# Patient Record
Sex: MALE | Race: WHITE | NOT HISPANIC OR LATINO | Employment: OTHER | URBAN - METROPOLITAN AREA
[De-identification: names, ages, dates, MRNs, and addresses within clinical notes are randomized per-mention and may not be internally consistent; named-entity substitution may affect disease eponyms.]

---

## 2017-09-19 ENCOUNTER — APPOINTMENT (OUTPATIENT)
Dept: ADMISSIONS | Facility: MEDICAL CENTER | Age: 72
DRG: 310 | End: 2017-09-19
Payer: COMMERCIAL

## 2017-09-21 ENCOUNTER — APPOINTMENT (OUTPATIENT)
Dept: ADMISSIONS | Facility: MEDICAL CENTER | Age: 72
DRG: 310 | End: 2017-09-21
Attending: NEUROLOGICAL SURGERY
Payer: COMMERCIAL

## 2017-09-21 RX ORDER — DOXYCYCLINE HYCLATE 50 MG/1
50 CAPSULE ORAL DAILY
Status: ON HOLD | COMMUNITY
End: 2018-02-14

## 2017-09-26 ENCOUNTER — RESOLUTE PROFESSIONAL BILLING HOSPITAL PROF FEE (OUTPATIENT)
Dept: HOSPITALIST | Facility: MEDICAL CENTER | Age: 72
End: 2017-09-26
Payer: COMMERCIAL

## 2017-09-26 ENCOUNTER — APPOINTMENT (OUTPATIENT)
Dept: RADIOLOGY | Facility: MEDICAL CENTER | Age: 72
DRG: 310 | End: 2017-09-26
Attending: NEUROLOGICAL SURGERY
Payer: COMMERCIAL

## 2017-09-26 ENCOUNTER — HOSPITAL ENCOUNTER (INPATIENT)
Facility: MEDICAL CENTER | Age: 72
LOS: 1 days | DRG: 310 | End: 2017-09-27
Attending: NEUROLOGICAL SURGERY | Admitting: NEUROLOGICAL SURGERY
Payer: COMMERCIAL

## 2017-09-26 PROBLEM — M51.36 DDD (DEGENERATIVE DISC DISEASE), LUMBAR: Status: ACTIVE | Noted: 2017-09-26

## 2017-09-26 PROBLEM — M51.369 DDD (DEGENERATIVE DISC DISEASE), LUMBAR: Status: ACTIVE | Noted: 2017-09-26

## 2017-09-26 LAB
EKG IMPRESSION: NORMAL
EKG IMPRESSION: NORMAL
GLUCOSE BLD-MCNC: 124 MG/DL (ref 65–99)
GLUCOSE BLD-MCNC: 124 MG/DL (ref 65–99)
LV EJECT FRACT MOD 2C 99903: 62.39
LV EJECT FRACT MOD 4C 99902: 56.25
LV EJECT FRACT MOD BP 99901: 57.79
TROPONIN I SERPL-MCNC: 0.02 NG/ML (ref 0–0.04)
TROPONIN I SERPL-MCNC: 0.02 NG/ML (ref 0–0.04)

## 2017-09-26 PROCEDURE — 82962 GLUCOSE BLOOD TEST: CPT | Mod: 91

## 2017-09-26 PROCEDURE — 160041 HCHG SURGERY MINUTES - EA ADDL 1 MIN LEVEL 4: Performed by: NEUROLOGICAL SURGERY

## 2017-09-26 PROCEDURE — 160048 HCHG OR STATISTICAL LEVEL 1-5: Performed by: NEUROLOGICAL SURGERY

## 2017-09-26 PROCEDURE — 36415 COLL VENOUS BLD VENIPUNCTURE: CPT

## 2017-09-26 PROCEDURE — 160009 HCHG ANES TIME/MIN: Performed by: NEUROLOGICAL SURGERY

## 2017-09-26 PROCEDURE — 700111 HCHG RX REV CODE 636 W/ 250 OVERRIDE (IP)

## 2017-09-26 PROCEDURE — 84484 ASSAY OF TROPONIN QUANT: CPT

## 2017-09-26 PROCEDURE — 700101 HCHG RX REV CODE 250

## 2017-09-26 PROCEDURE — 770020 HCHG ROOM/CARE - TELE (206)

## 2017-09-26 PROCEDURE — 500885 HCHG PACK, JACKSON TABLE: Performed by: NEUROLOGICAL SURGERY

## 2017-09-26 PROCEDURE — 160029 HCHG SURGERY MINUTES - 1ST 30 MINS LEVEL 4: Performed by: NEUROLOGICAL SURGERY

## 2017-09-26 PROCEDURE — 160002 HCHG RECOVERY MINUTES (STAT): Performed by: NEUROLOGICAL SURGERY

## 2017-09-26 PROCEDURE — 93306 TTE W/DOPPLER COMPLETE: CPT | Mod: 26 | Performed by: INTERNAL MEDICINE

## 2017-09-26 PROCEDURE — 700102 HCHG RX REV CODE 250 W/ 637 OVERRIDE(OP): Performed by: INTERNAL MEDICINE

## 2017-09-26 PROCEDURE — A9270 NON-COVERED ITEM OR SERVICE: HCPCS | Performed by: INTERNAL MEDICINE

## 2017-09-26 PROCEDURE — 93005 ELECTROCARDIOGRAM TRACING: CPT | Performed by: ANESTHESIOLOGY

## 2017-09-26 PROCEDURE — 160036 HCHG PACU - EA ADDL 30 MINS PHASE I: Performed by: NEUROLOGICAL SURGERY

## 2017-09-26 PROCEDURE — 160035 HCHG PACU - 1ST 60 MINS PHASE I: Performed by: NEUROLOGICAL SURGERY

## 2017-09-26 PROCEDURE — 93010 ELECTROCARDIOGRAM REPORT: CPT | Mod: 76 | Performed by: INTERNAL MEDICINE

## 2017-09-26 PROCEDURE — 502627 HCHG HEMOSTAT, SURGICEL 4X4: Performed by: NEUROLOGICAL SURGERY

## 2017-09-26 PROCEDURE — 93306 TTE W/DOPPLER COMPLETE: CPT

## 2017-09-26 PROCEDURE — 99222 1ST HOSP IP/OBS MODERATE 55: CPT | Performed by: INTERNAL MEDICINE

## 2017-09-26 PROCEDURE — 501838 HCHG SUTURE GENERAL: Performed by: NEUROLOGICAL SURGERY

## 2017-09-26 RX ORDER — POLYETHYLENE GLYCOL 3350 17 G/17G
1 POWDER, FOR SOLUTION ORAL
Status: DISCONTINUED | OUTPATIENT
Start: 2017-09-26 | End: 2017-09-27 | Stop reason: HOSPADM

## 2017-09-26 RX ORDER — ONDANSETRON 2 MG/ML
4 INJECTION INTRAMUSCULAR; INTRAVENOUS EVERY 4 HOURS PRN
Status: DISCONTINUED | OUTPATIENT
Start: 2017-09-26 | End: 2017-09-27 | Stop reason: HOSPADM

## 2017-09-26 RX ORDER — LIDOCAINE HYDROCHLORIDE 10 MG/ML
0.5 INJECTION, SOLUTION INFILTRATION; PERINEURAL
Status: DISCONTINUED | OUTPATIENT
Start: 2017-09-26 | End: 2017-09-27 | Stop reason: HOSPADM

## 2017-09-26 RX ORDER — ONDANSETRON 4 MG/1
4 TABLET, ORALLY DISINTEGRATING ORAL EVERY 4 HOURS PRN
Status: DISCONTINUED | OUTPATIENT
Start: 2017-09-26 | End: 2017-09-27 | Stop reason: HOSPADM

## 2017-09-26 RX ORDER — SODIUM CHLORIDE 9 MG/ML
INJECTION, SOLUTION INTRAVENOUS CONTINUOUS
Status: DISCONTINUED | OUTPATIENT
Start: 2017-09-26 | End: 2017-09-27 | Stop reason: HOSPADM

## 2017-09-26 RX ORDER — BISACODYL 10 MG
10 SUPPOSITORY, RECTAL RECTAL
Status: DISCONTINUED | OUTPATIENT
Start: 2017-09-26 | End: 2017-09-27 | Stop reason: HOSPADM

## 2017-09-26 RX ORDER — LIDOCAINE AND PRILOCAINE 25; 25 MG/G; MG/G
1 CREAM TOPICAL
Status: DISCONTINUED | OUTPATIENT
Start: 2017-09-26 | End: 2017-09-27 | Stop reason: HOSPADM

## 2017-09-26 RX ORDER — AMLODIPINE BESYLATE 10 MG/1
10 TABLET ORAL DAILY
Status: DISCONTINUED | OUTPATIENT
Start: 2017-09-27 | End: 2017-09-27 | Stop reason: HOSPADM

## 2017-09-26 RX ORDER — ACETAMINOPHEN 325 MG/1
650 TABLET ORAL EVERY 6 HOURS PRN
Status: DISCONTINUED | OUTPATIENT
Start: 2017-09-26 | End: 2017-09-27 | Stop reason: HOSPADM

## 2017-09-26 RX ORDER — AMOXICILLIN 250 MG
2 CAPSULE ORAL 2 TIMES DAILY
Status: DISCONTINUED | OUTPATIENT
Start: 2017-09-26 | End: 2017-09-27 | Stop reason: HOSPADM

## 2017-09-26 RX ORDER — OMEPRAZOLE 20 MG/1
20 CAPSULE, DELAYED RELEASE ORAL DAILY
Status: DISCONTINUED | OUTPATIENT
Start: 2017-09-27 | End: 2017-09-27 | Stop reason: HOSPADM

## 2017-09-26 RX ORDER — QUINAPRIL 40 MG/1
40 TABLET ORAL DAILY
Status: DISCONTINUED | OUTPATIENT
Start: 2017-09-26 | End: 2017-09-27 | Stop reason: HOSPADM

## 2017-09-26 RX ORDER — HEPARIN SODIUM 5000 [USP'U]/ML
5000 INJECTION, SOLUTION INTRAVENOUS; SUBCUTANEOUS EVERY 8 HOURS
Status: DISCONTINUED | OUTPATIENT
Start: 2017-09-27 | End: 2017-09-27 | Stop reason: HOSPADM

## 2017-09-26 RX ORDER — ROSUVASTATIN CALCIUM 10 MG/1
5 TABLET, COATED ORAL EVERY EVENING
Status: DISCONTINUED | OUTPATIENT
Start: 2017-09-26 | End: 2017-09-27 | Stop reason: HOSPADM

## 2017-09-26 RX ORDER — PANTOPRAZOLE SODIUM 40 MG/1
20 TABLET, DELAYED RELEASE ORAL DAILY
Status: DISCONTINUED | OUTPATIENT
Start: 2017-09-26 | End: 2017-09-26

## 2017-09-26 RX ORDER — MIDAZOLAM HYDROCHLORIDE 1 MG/ML
INJECTION INTRAMUSCULAR; INTRAVENOUS
Status: COMPLETED
Start: 2017-09-26 | End: 2017-09-26

## 2017-09-26 RX ORDER — SODIUM CHLORIDE 9 MG/ML
INJECTION, SOLUTION INTRAVENOUS CONTINUOUS
Status: CANCELLED | OUTPATIENT
Start: 2017-09-26

## 2017-09-26 RX ORDER — ACARBOSE 50 MG/1
50 TABLET ORAL 2 TIMES DAILY
Status: DISCONTINUED | OUTPATIENT
Start: 2017-09-26 | End: 2017-09-27 | Stop reason: HOSPADM

## 2017-09-26 RX ADMIN — ROSUVASTATIN CALCIUM 5 MG: 10 TABLET, FILM COATED ORAL at 20:17

## 2017-09-26 RX ADMIN — QUINAPRIL HYDROCHLORIDE 40 MG: 40 TABLET, FILM COATED ORAL at 18:00

## 2017-09-26 RX ADMIN — MIDAZOLAM 1 MG: 1 INJECTION INTRAMUSCULAR; INTRAVENOUS at 10:55

## 2017-09-26 ASSESSMENT — ENCOUNTER SYMPTOMS
HEADACHES: 0
NAUSEA: 0
PALPITATIONS: 0
WEIGHT LOSS: 0
NERVOUS/ANXIOUS: 0
ABDOMINAL PAIN: 0
SHORTNESS OF BREATH: 0
EYE REDNESS: 0
EYE DISCHARGE: 0
FOCAL WEAKNESS: 0
DIZZINESS: 0
SEIZURES: 0
VOMITING: 0
EYE PAIN: 0
STRIDOR: 0
INSOMNIA: 0
CHILLS: 0
DIARRHEA: 0
NECK PAIN: 0
BLURRED VISION: 0
SPUTUM PRODUCTION: 0
MYALGIAS: 0
HEARTBURN: 0
DEPRESSION: 0
FEVER: 0
ORTHOPNEA: 0
BACK PAIN: 1
COUGH: 0

## 2017-09-26 ASSESSMENT — PAIN SCALES - GENERAL
PAINLEVEL_OUTOF10: 2
PAINLEVEL_OUTOF10: 0

## 2017-09-26 NOTE — ASSESSMENT & PLAN NOTE
Post CABG  See cardiology at [place]  LBBB on EKG, but no previous ekg to compare    Patient has NO chest pain

## 2017-09-26 NOTE — CONSULTS
Cardiology New Consult Note    Date of note:    9/26/2017      Consulting Physician: Lenny Lew M.D.    Patient ID:    Name:   Wang Forman   YOB: 1945  Age:   72 y.o.  male   MRN:   [id number]      Consult question: Evaluation of new left bundle branch block    HPI:  Wang Forman is a 72-year-old man with a history of multivessel coronary artery disease status post four-vessel bypass surgery in 2013 done at [facility] who presents for elective lumbar spine surgery. He was in the process of mobilizing to the table when his QRS widened and a 12-lead EKG reviewed revealed left bundle branch block. At that point anesthesia canceled the case.    In speaking with the patient, he has no chest discomfort, nor any other cardiovascular complaints. He has been in his usual state of health up to and leading to this procedure.    ROS: All others reviewed and negative.    PMH (problem list reviewed, pertinent to this consultation):     1. Coronary artery disease: Status post 4 vessel bypass surgery after a STEMI in 2013 (LIMA-LAD, SVG-diagonal, SVG-OM1, SVG-OM 2).  2. Paroxysmal atrial fibrillation  3. Obesity  4. Renal artery stenosis  5. Sleep apnea  6. Dyslipidemia  7. Essential hypertension  8. Osteoarthritis    Outpatient Medications:     Amoxicillin when necessary dental work  Doxycycline  Protonix  Amlodipine 10 mg by mouth daily  Crestor 5 mg by mouth daily at bedtime  Precose  Testosterone IM  Cialis 5 mg by mouth daily when necessary  Quinapril 40 mg by mouth daily  Aspirin 81 mg by mouth daily  Multivitamin    Allergies: Naproxen; Nsaids; Codeine; Morphine; Statins [hmg-coa-r inhibitors]; and Sulfa drugs    Family History: family history includes Cancer in his sister; Heart Attack (age of onset: 62) in his father; Heart Disease in his father; Hypertension in his brother.    Social History:  reports that he quit smoking about 30 years ago. His smoking use included Cigarettes. He has never  "used smokeless tobacco. He reports that he does not drink alcohol or use drugs.    Physical Exam    Body mass index is 34.56 kg/m².  Blood pressure 141/65, pulse 60, temperature 37.1 °C (98.8 °F), resp. rate (!) 11, height 1.702 m (5' 7\"), weight 100.1 kg (220 lb 10.9 oz), SpO2 97 %.  Vitals:    17 1130 17 1145 17 1200 17 1215   BP:       Pulse: (!) 59 (!) 58 61 60   Resp: 14 16 (!) 24 (!) 11   Temp:       SpO2: 97% 97% 96% 97%   Weight:       Height:         Oxygen Therapy:  Pulse Oximetry: 97 %, O2 (LPM): 0, O2 Delivery: None (Room Air)    General:Pleasant, elderly man accompanied by his wife in no distress  HEENT: No jugular venous distension sitting upright, pupils equal, round and reactive to light, extraocular movements intact  Heart: All healed sternotomy scar noted. Normal rate, regular rhythm, no murmurs, no rubs or gallops  Lungs: Clear to auscultation bilaterally  Abdomen: Bowel sounds positive, non tender, non distended, no masses   Extremities: No lower extremity edema, no clubbing, no cyanosis  Neurological: Alert and oriented x 3, no focal deficit, full exam deferred  Skin: no rashes    Labs (reviewed and notable for):     CBC, 2017 pending at the time of this note  Chemistry panel, 2017 pending at the time of this note  Cardiac markers: Initial troponin is negative at 0.02 ng/mL    EKG: Shows sinus rhythm with a left bundle branch block. Previous EKG from 2013 shows sinus rhythm with an incomplete left bundle branch block    Echo: Pending at the time of this note      Impression and Medical Decision Makin. New left bundle branch block: I do not believe this is ischemic in etiology, but rather progression in his calcific conduction system disease. His initial troponin is negative, I would recommend a repeat troponin in 3-4 hours. I have also ordered an echocardiogram. So long as he has no new wall motion abnormality and a normal left ventricular ejection " fraction I do not believe he needs additional workup, and can be discharged after an observation admission at that time.    2. Paroxysmal atrial fibrillation: Maintaining sinus rhythm without antiarrhythmic therapy and on aspirin. I will defer anticoagulation to the patient and his primary cardiologist.    3. Coronary artery disease: Continue outpatient management at the time of discharge      This note was dictated using Dragon speech recognition software.    Thank you for allowing me to participate in the care of this patient.  Please call if any clarification will be helpful.      Guevara Herbert MD  Cardiologist, [facility]     Addendum: Troponin negative x 2, no anterior wall motion abnormality on echo.  No additional work-up required prior to surgery in my opinion and stable for discharge from the cardiovascular perspective.  No other recommendations, will sign off.    Guevara Herbert MD  Cardiologist, [facility]

## 2017-09-26 NOTE — CONSULTS
" Hospital Medicine History and Physical      Date of Service  9/26/2017    Chief Complaint  No chief complaint on file.  back pain    Consulting physician: Dr Lew    Reason for consult: medical management    History of Presenting Illness  Dickson is a 72 y.o. male PMH of CAD post CABG, HTN, DMII, who presents with back pain and plan for elective back surgery. During pre-opt he developed in LBBB. He was intubated briefly for surgery, but due to above surgery was postponed. He was extubated. He was seen in PACU. Denies chest pain, palpitation. Medicine was consulted for medical management. Recommended cardiology consult for pre-opt risk assessment for her lumbar surgery.    Primary Care Physician  Mohan Kurtz M.D.      Code Status  Full code    Review of Systems  Review of Systems   Constitutional: Negative for chills, fever and weight loss.   HENT: Negative for congestion and nosebleeds.    Eyes: Negative for blurred vision, pain, discharge and redness.   Respiratory: Negative for cough, sputum production, shortness of breath and stridor.    Cardiovascular: Negative for chest pain, palpitations and orthopnea.   Gastrointestinal: Negative for abdominal pain, diarrhea, heartburn, nausea and vomiting.   Genitourinary: Negative for dysuria, frequency and urgency.   Musculoskeletal: Positive for back pain. Negative for myalgias and neck pain.   Skin: Negative for itching and rash.   Neurological: Negative for dizziness, focal weakness, seizures and headaches.   Psychiatric/Behavioral: Negative for depression. The patient is not nervous/anxious and does not have insomnia.      Please see HPI, all other systems were reviewed and are negative (AMA/CMS criteria)     Past Medical History  Past Medical History:   Diagnosis Date   • H/O bone density study 03/30/12    [place],    • Adult acne    • Allergic rhinitis    • Anesthesia     \"hard time waking up\" and itching    • Arthritis of knee    • ASTHMA     states he's only " had 2 episodes    • Atrial fibrillation (CMS-HCC)     post-op   • Breath shortness     with exertion   • Cataract     bilat IOL   • Colon polyps    • Coronary artery disease     cabg/ [place]   • Degenerative disk disease     lumbar and cervical   • DISH (diffuse idiopathic skeletal hyperostosis)     back   • Fibromyalgia    • Heart burn    • History of kidney stones    • Hyperlipidemia     with elevated triglycerides and low HDL   • Hypertension    • Hypogonadism, male    • Myalgia    • Osteoarthritis    • Restless leg syndrome    • Sleep apnea     on bipap Garcia   • Type 2 diabetes mellitus (CMS-HCC)     oral meds (pt becomes symptomatic when FSBS is in the 70s)    • Vertigo        Surgical History  Past Surgical History:   Procedure Laterality Date   • MULTIPLE CORONARY ARTERY BYPASS  12/5/2013    [facility]   • CARPAL TUNNEL ENDOSCOPIC  2011    RT   • KNEE ARTHROSCOPY  2009    RT   • ANGIOPLASTY  2009    ADDITIONAL DRUG-ELUTING STENT IN CIRCUMFLEX   • ANGIOPLASTY  2004    MULTIVESSEL ANGIOPLASTY AND DRUG-ELUTING STENTS   • CATARACT EXTRACTION WITH IOL     • UVULOPHARYNGOPALATOPLASTY         Medications  No current facility-administered medications on file prior to encounter.      Current Outpatient Prescriptions on File Prior to Encounter   Medication Sig Dispense Refill   • pantoprazole (PROTONIX) 40 MG Tablet Delayed Response TAKE ONE TABLET BY MOUTH ONCE DAILY 90 Tab 3   • amlodipine (NORVASC) 10 MG Tab Take 1 Tab by mouth every day. 90 Tab 3   • rosuvastatin (CRESTOR) 5 MG Tab Take 5 mg by mouth every evening.     • acarbose (PRECOSE) 50 MG Tab Take 1 Tab by mouth 2 times a day. 180 Tab 3   • testosterone cypionate (DEPO-TESTOSTERONE) 200 MG/ML Solution injection 1 mL by Intramuscular route every 21 days. 1 mL 11   • tadalafil (CIALIS) 5 MG tablet Take 1 Tab by mouth as needed for Erectile Dysfunction. 30 Tab 3   • quinapril (ACCUPRIL) 40 MG tablet Take 1 Tab by mouth every day. 90 Tab 3   • aspirin  EC (ECOTRIN) 81 MG TBEC Take 81 mg by mouth every day.     • Multiple Vitamins-Minerals (SENIOR MULTIVITAMIN PLUS) TABS Take 1 Tab by mouth every day.       Family History  Family History   Problem Relation Age of Onset   • Heart Attack Father 62     SILENT   • Heart Disease Father    • Cancer Sister      colon   • Hypertension Brother          Social History  Social History   Substance Use Topics   • Smoking status: Former Smoker     Types: Cigarettes     Quit date: 1987   • Smokeless tobacco: Never Used   • Alcohol use No       Allergies  Allergies   Allergen Reactions   • Naproxen Anaphylaxis   • Nsaids Anaphylaxis   • Codeine      Nausea    • Morphine Nausea   • Statins [Hmg-Coa-R Inhibitors]      Intolerance of statins, able to tolerate Crestor  Leg cramping   • Sulfa Drugs Nausea        Physical Exam  Laboratory   Hemodynamics  Temp (24hrs), Av.1 °C (98.8 °F), Min:37.1 °C (98.8 °F), Max:37.1 °C (98.8 °F)   Temperature: 37.1 °C (98.8 °F)  Pulse  Av.3  Min: 62  Max: 84 Heart Rate (Monitored): 71  Blood Pressure : 141/65, NIBP: 125/62      Respiratory      Respiration: 15, Pulse Oximetry: 100 %             Physical Exam   Constitutional: He is oriented to person, place, and time. No distress.   HENT:   Head: Normocephalic and atraumatic.   Mouth/Throat: Oropharynx is clear and moist.   Eyes: Conjunctivae and EOM are normal. Pupils are equal, round, and reactive to light.   Neck: Normal range of motion. Neck supple. No tracheal deviation present. No thyromegaly present.   Cardiovascular: Normal rate and regular rhythm.    No murmur heard.  Pulmonary/Chest: Effort normal and breath sounds normal. No respiratory distress. He has no wheezes.   Abdominal: Soft. Bowel sounds are normal. He exhibits no distension. There is no tenderness.   Musculoskeletal: He exhibits tenderness. He exhibits no edema.   Back pain   Neurological: He is alert and oriented to person, place, and time. No cranial nerve deficit.    Skin: Skin is warm and dry. He is not diaphoretic. No erythema.   Psychiatric: He has a normal mood and affect. His behavior is normal. Thought content normal.               No results for input(s): ALTSGPT, ASTSGOT, ALKPHOSPHAT, TBILIRUBIN, DBILIRUBIN, GAMMAGT, AMYLASE, LIPASE, ALB, PREALBUMIN, GLUCOSE in the last 72 hours.              No results found for: TROPONINI    Imaging  DX-PORTABLE FLUORO > 1 HOUR    (Results Pending)   DX-SPINE-ANY ONE VIEW    (Results Pending)     EKG  per my independant read:  QTc: 532, HR: 83, Normal Sinus Rhythm, LBBB, no previous ekg to compare     Assessment/Plan     I anticipate this patient will require at least two midnights for appropriate medical management, necessitating inpatient admission.    Diet-controlled type 2 diabetes mellitus (CMS-HCC)- (present on admission)   Assessment & Plan    On insulin management  Hypoglycemic protocol        Lumbar disc disease- (present on admission)   Assessment & Plan    Plan for lumbar laminectomy by surgery once ok by cardiology        CAD (coronary artery disease)- (present on admission)   Assessment & Plan    Post CABG  See cardiology at [place]  LBBB on EKG, but no previous ekg to compare  Recommended cardiology consult  Revised cardiac risk index: cannot be calculated at this moment since still waiting for CMP  Echo: per card          Essential hypertension- (present on admission)   Assessment & Plan    Stable  Continue amlodipine, quinapril          Thank you for the consult, hospitalist service will continue to follow.  Prophylaxis:  sc heparin

## 2017-09-26 NOTE — OR NURSING
Pt has been in the PACU since 1048 am. He has not had any signs of LBBB while here at this time 1438. Trop's drawn and EKG complete. Waiting for ECHO to come to bedside. Pts wife has been updated and to see him varies times today. He is in good spirits and understands the plan for today. Orders have been received for admission status. Pt has also been given a meal tray today and eaten. No complaints of pain at this time.       Update 1554 pt transported to room with monitor. Doing well and no signs of LBBB. Pts wife updated and room number given. Pt in good spirits.

## 2017-09-27 VITALS
HEART RATE: 62 BPM | BODY MASS INDEX: 34.53 KG/M2 | OXYGEN SATURATION: 93 % | DIASTOLIC BLOOD PRESSURE: 73 MMHG | HEIGHT: 67 IN | TEMPERATURE: 98.7 F | WEIGHT: 220.02 LBS | RESPIRATION RATE: 18 BRPM | SYSTOLIC BLOOD PRESSURE: 142 MMHG

## 2017-09-27 PROCEDURE — 99232 SBSQ HOSP IP/OBS MODERATE 35: CPT | Performed by: HOSPITALIST

## 2017-09-27 PROCEDURE — 700111 HCHG RX REV CODE 636 W/ 250 OVERRIDE (IP): Performed by: INTERNAL MEDICINE

## 2017-09-27 RX ADMIN — HEPARIN SODIUM 5000 UNITS: 5000 INJECTION, SOLUTION INTRAVENOUS; SUBCUTANEOUS at 06:06

## 2017-09-27 ASSESSMENT — ENCOUNTER SYMPTOMS
SHORTNESS OF BREATH: 0
DIAPHORESIS: 0

## 2017-09-27 ASSESSMENT — PAIN SCALES - GENERAL: PAINLEVEL_OUTOF10: 0

## 2017-09-27 NOTE — CARE PLAN
Problem: Safety  Goal: Will remain free from injury  Outcome: PROGRESSING AS EXPECTED  Pt A/Ox4, ambulates without difficulty, personal belongings & call light within reach, room clutter free, bed locked & in lowest position, side rails up x2, safety & comfort maintained, monitoring ongoing.    Problem: Knowledge Deficit  Goal: Knowledge of disease process/condition, treatment plan, diagnostic tests, and medications will improve  Outcome: PROGRESSING AS EXPECTED  Plan of care discussed and reviewed with pt and spouse during bedside report, both verbalized understanding. All questions and concerns addressed, will update pt on POC as needed.

## 2017-09-27 NOTE — DISCHARGE INSTRUCTIONS
Discharge Instructions    Discharged to home by car with relative. Discharged via wheelchair, hospital escort: Yes.  Special equipment needed: Not Applicable    Be sure to schedule a follow-up appointment with your primary care doctor or any specialists as instructed.     Discharge Plan:   Diet Plan: Discussed (cardiac)  Activity Level: Discussed (as tolerated)  Confirmed Follow up Appointment: Patient to Call and Schedule Appointment  Confirmed Symptoms Management: Discussed  Medication Reconciliation Updated: Yes  Influenza Vaccine Indication: Patient Refuses    I understand that a diet low in cholesterol, fat, and sodium is recommended for good health. Unless I have been given specific instructions below for another diet, I accept this instruction as my diet prescription.   Other diet: cardiac    Special Instructions: None    · Is patient discharged on Warfarin / Coumadin?   No     · Is patient Post Blood Transfusion?  No    Depression / Suicide Risk    As you are discharged from this [facility] facility, it is important to learn how to keep safe from harming yourself.    Recognize the warning signs:  · Abrupt changes in personality, positive or negative- including increase in energy   · Giving away possessions  · Change in eating patterns- significant weight changes-  positive or negative  · Change in sleeping patterns- unable to sleep or sleeping all the time   · Unwillingness or inability to communicate  · Depression  · Unusual sadness, discouragement and loneliness  · Talk of wanting to die  · Neglect of personal appearance   · Rebelliousness- reckless behavior  · Withdrawal from people/activities they love  · Confusion- inability to concentrate     If you or a loved one observes any of these behaviors or has concerns about self-harm, here's what you can do:  · Talk about it- your feelings and reasons for harming yourself  · Remove any means that you might use to hurt yourself (examples: pills, rope,  extension cords, firearm)  · Get professional help from the community (Mental Health, Substance Abuse, psychological counseling)  · Do not be alone:Call your Safe Contact- someone whom you trust who will be there for you.  · Call your local CRISIS HOTLINE 465-1926 or 154-184-6253  · Call your local Children's Mobile Crisis Response Team [place] (039) 460-8904 or www.[employer]  · Call the toll free National Suicide Prevention Hotlines   · National Suicide Prevention Lifeline 515-653-TVEM (0766)  · [employer] Hope Line Network 800-SUICIDE (418-5208)    Arrhythmia Categories  Your heart is a muscle that works to pump blood through your body by regular contractions. The beating of your heart is controlled by a system of special pacemaker cells. These cells control the electrical activity of the heart. When the system controlling this regular beating is disturbed, a heart rhythm abnormality (arrhythmia) results.  WHEN YOUR HEART SKIPS A BEAT  One of the most common and least serious heart arrhythmias is called an ectopic or premature atrial heartbeat (PAC). This may be noticed as a small change in your regular pulse. A PAC originates from the top part (atrium) of the heart. Within the right atrium, the SA node is the area that normally controls the regularity of the heart. PACs occur in heart tissue outside of the SA node region. You may feel this as a skipped beat or heart flutter, especially if several occur in succession or occur frequently.   Another arrhythmia is ventricular premature complex (VCP or PVC). These extra beats start out in the bottom, more muscular chambers of the heart. In most cases a PVC is harmless. If there are underlying causes that are making the heart irritable such as an overactive thyroid or a prior heart attack PVCs may be of more concern. In a few cases, medications to control the heart rhythm may be prescribed.  Things to try at home:  · Cut down or avoid alcohol, tobacco and  caffeine.   · Get enough sleep.   · Reduce stress.   · Exercise more.   WHEN THE HEART BEATS TOO FAST  Atrial tachycardia is a fast heart rate, which starts out in the atrium. It may last from minutes to much longer. Your heart may beat 140 to 240 times per minute instead of the normal 60 to 100.  · Symptoms include a worried feeling (anxiety) and a sense that your heart is beating fast and hard.   · You may be able to stop the fast rate by holding your breath or bearing down as if you were going to have a bowel movement.   · This type of fast rate is usually not dangerous.   Atrial fibrillation and atrial flutter are other fast rhythms that start in the atria. Both conditions keep the atria from filling with enough blood so the heart does not work well.  · Symptoms include feeling lightheaded or faint.   · These fast rates may be the result of heart damage or disease. Too much thyroid hormone may play a role.   · There may be no clear cause or it may be from heart disease or damage.   · Medication or a special electrical treatment (cardioversion) may be needed to get the heart beating normally.   Ventricular tachycardia is a fast heart rate that starts in the lower muscular chambers (ventricles) This is a serious disorder that requires treatment as soon as possible. You need someone else to get and use a small defibrillator.  · Symptoms include collapse, chest pain, or being short of breath.   · Treatment may include medication, procedures to improve blood flow to the heart, or an implantable cardiac defibrillator (ICD).   DIAGNOSIS   · A cardiogram (EKG or ECG) will be done to see the arrhythmia, as well as lab tests to check the underlying cause.   · If the extra beats or fast rate come and go, you may wear a Holter monitor that records your heart rate for a longer period of time.   HOME CARE INSTRUCTIONS   If your caregiver has given you a follow-up appointment, it is very important to keep that appointment. Not  keeping the appointment could result in a heart attack, permanent injury, pain, and disability. If there is any problem keeping the appointment, you must call back to this facility for assistance.   SEEK MEDICAL CARE IF:  · You have irregular or fast heartbeats (palpitations).   · You experience skipped beats.   · You develop lightheadedness.   · You have chest discomfort.   · You develop shortness of breath.   · You have more frequent episodes, if you are already being treated.   SEEK IMMEDIATE MEDICAL CARE IF:   · You have severe chest pain, especially if the pain is crushing or pressure-like and spreads to the arms, back, neck, or jaw, or if you have sweating, feeling sick to your stomach (nausea), or shortness of breath. THIS IS AN EMERGENCY. Do not wait to see if the pain will go away. Get medical help at once. Call 911 or 0 (). DO NOT drive yourself to the hospital.   · You feel dizzy or faint.   · You have episodes of previously documented atrial tachycardia that do not resolve with the techniques your caregiver has taught you.   · Irregular or rapid heartbeats begin to occur more often than in the past, especially if they are associated with more pronounced symptoms or of longer duration.   Document Released: 12/18/2006 Document Revised: 03/11/2013 Document Reviewed: 05/01/2008  [employer]® Patient Information ©2013 [employer], [place].

## 2017-09-27 NOTE — PROGRESS NOTES
Progress Note               Author: Krishna Corralesarez Date & Time created: 2017  8:09 AM     Interval History:  On positioning for surgery yesterday, patient developed abnormal heart rhythm with left BBB. Seen by Dr. Herbert, cardiology. Work up negative. Troponin negative.   Today doing well. No complaints.    Review of Systems:  Review of Systems   Constitutional: Negative for diaphoresis.   Respiratory: Negative for shortness of breath.    Cardiovascular: Negative for chest pain.       Physical Exam:  Physical Exam   Constitutional: He is oriented to person, place, and time.   Neurological: He is alert and oriented to person, place, and time. No cranial nerve deficit. He exhibits normal muscle tone. Coordination normal.       Labs:        Invalid input(s): [id number]  Recent Labs      17   1147  17   1711   TROPONINI  0.02  0.02     No results for input(s): SODIUM, POTASSIUM, CHLORIDE, CO2, BUN, CREATININE, MAGNESIUM, PHOSPHORUS, CALCIUM in the last 72 hours.  No results for input(s): ALTSGPT, ASTSGOT, ALKPHOSPHAT, TBILIRUBIN, DBILIRUBIN, GAMMAGT, AMYLASE, LIPASE, ALB, PREALBUMIN, GLUCOSE in the last 72 hours.  No results for input(s): RBC, HEMOGLOBIN, HEMATOCRIT, PLATELETCT, PROTHROMBTM, APTT, INR, IRON, FERRITIN, TOTIRONBC in the last 72 hours.      Hemodynamics:  Temp (24hrs), Av.8 °C (98.3 °F), Min:36.3 °C (97.4 °F), Max:37.1 °C (98.7 °F)  Temperature: 37.1 °C (98.7 °F)  Pulse  Av.9  Min: 58  Max: 84Heart Rate (Monitored): 63  Blood Pressure : 142/73, NIBP: 118/62     Respiratory:    Respiration: 18, Pulse Oximetry: 93 %           Fluids:    Intake/Output Summary (Last 24 hours) at 17 0809  Last data filed at 17 1400   Gross per 24 hour   Intake             1100 ml   Output                0 ml   Net             1100 ml     Weight: 99.8 kg (220 lb 0.3 oz)  GI/Nutrition:  Orders Placed This Encounter   Procedures   • DIET ORDER     Standing Status:   Standing      Number of Occurrences:   1     Order Specific Question:   Diet:     Answer:   Cardiac [6]     Medical Decision Making, by Problem:  Active Hospital Problems    Diagnosis   • DDD (degenerative disc disease), lumbar [M51.36]   • Diet-controlled type 2 diabetes mellitus (CMS-HCC) [E11.9]   • CAD (coronary artery disease) [I25.10]   • Lumbar disc disease [M51.9]   • Essential hypertension [I10]       Plan:  OK for D/C home.   Follow up with PCP at directed.   Needs surgery scheduled at a later date.       Reviewed items::  Labs reviewed

## 2017-09-27 NOTE — DISCHARGE SUMMARY
DATE OF ADMISSION:  09/26/2017    DATE OF DISCHARGE:  09/27/2017    DISCHARGE DIAGNOSES:  1.  L2 through L5 lumbar spinal stenosis.  2.  Neurogenic claudication.  3.  Lumbar radiculopathy.  4.  Left bundle-branch block.  5.  Failed conservative therapies.    SURGERY PERFORMED:  None.    CONSULTATION OBTAINED:  Cardiology consultation obtained with Dr. Guevara Herbert MD from [facility].    REASON FOR ADMISSION:  This is a 72-year-old male with history of lower back   pain and symptoms of neurogenic claudication.  His workup did reveal L2   through L5 lumbar spinal stenosis.  He failed to improve with conservative   measures.  He was indicated for L2 through L5 lumbar laminectomies.    HOSPITAL COURSE:  The patient was admitted on the date of planned surgery.    Upon induction of anesthesia and moving of the patient into position for   surgery, he developed widening of his QRS.  A 12-lead EKG was done, which   revealed a left bundle-branch block.  At that point, the anesthesia canceled   the case.  The patient was transferred to the floor.  He was seen in   consultation by Dr. Herbert of cardiology.  Further workup revealed that it   was negative for any further cardiac issues.  Serial troponins were negative.    Dr. Herbert has cleared the patient for discharge home with followup with   his primary care physician.    INSTRUCTIONS ON DISCHARGE:  The patient is to ambulate as tolerated.  He is   going to follow up with his primary care physician as directed.  He is going   to have his surgery rescheduled for a later date.  He is going to follow up   with [facility] once surgery has been rescheduled.       ____________________________________     SOULEYMANE NANCE / GRADY    DD:  09/27/2017 08:17:52  DT:  09/27/2017 08:53:46    D#:  [id number]  Job#:  [id number]    cc: _____ _____

## 2017-09-27 NOTE — PROGRESS NOTES
Assumed care at 0700, bedside report from NOC shift RN. Patient is A&O x 4 with no signs of distress. Inital assessment completed, orders reviewed, call light is with in reach, and hourly rounding initiated. POC addressed with patient, no additional questions at this time.

## 2017-09-28 ASSESSMENT — ENCOUNTER SYMPTOMS
GASTROINTESTINAL NEGATIVE: 1
EYES NEGATIVE: 1
PALPITATIONS: 0
FEVER: 0
ORTHOPNEA: 0
NEUROLOGICAL NEGATIVE: 1
RESPIRATORY NEGATIVE: 1
CLAUDICATION: 0
CHILLS: 0
HEADACHES: 0

## 2017-09-28 NOTE — OP REPORT
DATE OF SERVICE:  09/26/2017    PREOPERATIVE NOTE:  The patient was brought to the operating room for a lumbar   L2-L5 decompression.  However, as the patient was intubated by anesthesia, he   developed a left-sided bundle-branch block, which was reproducible.  The   patient was hemodynamically stable.  Now, the anesthesiologist correctly   discussed the clinical situation and the need to cancel the cage, which I   completely agreed with, as this was an elective surgery and the patient   required admission under medicine for cardiac consultation to clear him given   his high risks with his cardiac condition and comorbidities.  Therefore, the   surgical portion of the procedure was abandoned and the patient was extubated   safely and brought to the recovery room where he was hemodynamically stable.    The patient will be rescheduled once he is cleared by cardiology at a later   date.       ____________________________________     ELLEN MORA MD    JJIKE / NTS    DD:  09/28/2017 10:11:43  DT:  09/28/2017 10:54:34    D#:  [id number]  Job#:  [id number]

## 2017-09-28 NOTE — PROGRESS NOTES
[facility] Hospitalist Progress Note    Date of Service: 2017    Chief Complaint  72 y.o. male admitted 2017 with new ekg changes    Interval Problem Update  No new cardiac issues    Vitals stable     Afebrile    Surgery postponed and will be scheduled electively    Medical clear    Consultants/Specialty    Cardiology    Spine surgery    Disposition  home        Review of Systems   Constitutional: Negative for chills and fever.   HENT: Negative for hearing loss and tinnitus.    Eyes: Negative.    Respiratory: Negative.    Cardiovascular: Negative for chest pain, palpitations, orthopnea and claudication.   Gastrointestinal: Negative.    Genitourinary: Negative.    Neurological: Negative.  Negative for headaches.      Physical Exam  Laboratory/Imaging   Hemodynamics  No data recorded.      Pulse  Av.9  Min: 58  Max: 84           Respiratory                    Fluids  No intake or output data in the 24 hours ending 17 1143    Nutrition  No orders of the defined types were placed in this encounter.    Physical Exam   Constitutional: No distress.   Eyes: Left eye exhibits no discharge.   Neck: No JVD present. No tracheal deviation present.   Cardiovascular: Normal rate.  Exam reveals no gallop and no friction rub.    No murmur heard.  Pulmonary/Chest: He has no wheezes. He has no rales.   Abdominal: There is no tenderness.   Musculoskeletal: He exhibits no edema or deformity.   Skin: He is not diaphoretic.                                Assessment/Plan     Diet-controlled type 2 diabetes mellitus (CMS-HCC)- (present on admission)   Assessment & Plan    On insulin management  Hypoglycemic protocol        Lumbar disc disease- (present on admission)   Assessment & Plan    Plan for lumbar laminectomy as outpatient        CAD (coronary artery disease)- (present on admission)   Assessment & Plan    Post CABG  See cardiology at [facility]  LBBB on EKG, but no previous ekg to compare    Patient has NO chest  pain        Essential hypertension- (present on admission)   Assessment & Plan    Stable  Continue amlodipine, quinapril          Quality-Core Measures

## 2017-10-23 ENCOUNTER — APPOINTMENT (OUTPATIENT)
Dept: ADMISSIONS | Facility: MEDICAL CENTER | Age: 72
End: 2017-10-23
Attending: NEUROLOGICAL SURGERY
Payer: COMMERCIAL

## 2017-10-24 ENCOUNTER — APPOINTMENT (OUTPATIENT)
Dept: RADIOLOGY | Facility: MEDICAL CENTER | Age: 72
DRG: 517 | End: 2017-10-24
Attending: NEUROLOGICAL SURGERY
Payer: COMMERCIAL

## 2017-10-24 ENCOUNTER — HOSPITAL ENCOUNTER (INPATIENT)
Facility: MEDICAL CENTER | Age: 72
LOS: 2 days | DRG: 517 | End: 2017-10-26
Attending: NEUROLOGICAL SURGERY | Admitting: NEUROLOGICAL SURGERY
Payer: COMMERCIAL

## 2017-10-24 PROBLEM — M51.36 DEGENERATION OF LUMBAR INTERVERTEBRAL DISC: Status: ACTIVE | Noted: 2017-10-24

## 2017-10-24 PROBLEM — M51.369 DEGENERATION OF LUMBAR INTERVERTEBRAL DISC: Status: ACTIVE | Noted: 2017-10-24

## 2017-10-24 LAB
GLUCOSE BLD-MCNC: 128 MG/DL (ref 65–99)
GLUCOSE BLD-MCNC: 199 MG/DL (ref 65–99)
GLUCOSE BLD-MCNC: 206 MG/DL (ref 65–99)

## 2017-10-24 PROCEDURE — 160002 HCHG RECOVERY MINUTES (STAT): Performed by: NEUROLOGICAL SURGERY

## 2017-10-24 PROCEDURE — 501838 HCHG SUTURE GENERAL: Performed by: NEUROLOGICAL SURGERY

## 2017-10-24 PROCEDURE — 700102 HCHG RX REV CODE 250 W/ 637 OVERRIDE(OP): Performed by: PHYSICIAN ASSISTANT

## 2017-10-24 PROCEDURE — 72020 X-RAY EXAM OF SPINE 1 VIEW: CPT

## 2017-10-24 PROCEDURE — A9270 NON-COVERED ITEM OR SERVICE: HCPCS | Performed by: PHYSICIAN ASSISTANT

## 2017-10-24 PROCEDURE — 160035 HCHG PACU - 1ST 60 MINS PHASE I: Performed by: NEUROLOGICAL SURGERY

## 2017-10-24 PROCEDURE — 160009 HCHG ANES TIME/MIN: Performed by: NEUROLOGICAL SURGERY

## 2017-10-24 PROCEDURE — 700102 HCHG RX REV CODE 250 W/ 637 OVERRIDE(OP)

## 2017-10-24 PROCEDURE — 160048 HCHG OR STATISTICAL LEVEL 1-5: Performed by: NEUROLOGICAL SURGERY

## 2017-10-24 PROCEDURE — 500885 HCHG PACK, JACKSON TABLE: Performed by: NEUROLOGICAL SURGERY

## 2017-10-24 PROCEDURE — 160003 HCHG RECOVERY MINUTES (EXTENDED) STAT: Performed by: NEUROLOGICAL SURGERY

## 2017-10-24 PROCEDURE — 700101 HCHG RX REV CODE 250

## 2017-10-24 PROCEDURE — 700111 HCHG RX REV CODE 636 W/ 250 OVERRIDE (IP)

## 2017-10-24 PROCEDURE — 700105 HCHG RX REV CODE 258: Performed by: PHYSICIAN ASSISTANT

## 2017-10-24 PROCEDURE — 700111 HCHG RX REV CODE 636 W/ 250 OVERRIDE (IP): Performed by: PHYSICIAN ASSISTANT

## 2017-10-24 PROCEDURE — 500367 HCHG DRAIN KIT, HEMOVAC: Performed by: NEUROLOGICAL SURGERY

## 2017-10-24 PROCEDURE — 160036 HCHG PACU - EA ADDL 30 MINS PHASE I: Performed by: NEUROLOGICAL SURGERY

## 2017-10-24 PROCEDURE — 94660 CPAP INITIATION&MGMT: CPT

## 2017-10-24 PROCEDURE — 82962 GLUCOSE BLOOD TEST: CPT | Mod: 91

## 2017-10-24 PROCEDURE — 502626 HCHG SURGIFLO HEMOSTATIC MATRIX 6ML: Performed by: NEUROLOGICAL SURGERY

## 2017-10-24 PROCEDURE — 160029 HCHG SURGERY MINUTES - 1ST 30 MINS LEVEL 4: Performed by: NEUROLOGICAL SURGERY

## 2017-10-24 PROCEDURE — A9270 NON-COVERED ITEM OR SERVICE: HCPCS

## 2017-10-24 PROCEDURE — 700105 HCHG RX REV CODE 258

## 2017-10-24 PROCEDURE — 01NB0ZZ RELEASE LUMBAR NERVE, OPEN APPROACH: ICD-10-PCS | Performed by: NEUROLOGICAL SURGERY

## 2017-10-24 PROCEDURE — 700112 HCHG RX REV CODE 229: Performed by: PHYSICIAN ASSISTANT

## 2017-10-24 PROCEDURE — 770006 HCHG ROOM/CARE - MED/SURG/GYN SEMI*

## 2017-10-24 PROCEDURE — 160041 HCHG SURGERY MINUTES - EA ADDL 1 MIN LEVEL 4: Performed by: NEUROLOGICAL SURGERY

## 2017-10-24 RX ORDER — QUINAPRIL 40 MG/1
40 TABLET ORAL DAILY
Status: DISCONTINUED | OUTPATIENT
Start: 2017-10-24 | End: 2017-10-24

## 2017-10-24 RX ORDER — LIDOCAINE HYDROCHLORIDE 10 MG/ML
INJECTION, SOLUTION INFILTRATION; PERINEURAL
Status: COMPLETED
Start: 2017-10-24 | End: 2017-10-24

## 2017-10-24 RX ORDER — ONDANSETRON 2 MG/ML
INJECTION INTRAMUSCULAR; INTRAVENOUS
Status: COMPLETED
Start: 2017-10-24 | End: 2017-10-24

## 2017-10-24 RX ORDER — SODIUM CHLORIDE 9 MG/ML
INJECTION, SOLUTION INTRAVENOUS CONTINUOUS
Status: DISCONTINUED | OUTPATIENT
Start: 2017-10-24 | End: 2017-10-24

## 2017-10-24 RX ORDER — HYDROCODONE BITARTRATE AND ACETAMINOPHEN 10; 325 MG/1; MG/1
1-2 TABLET ORAL EVERY 4 HOURS PRN
Status: DISCONTINUED | OUTPATIENT
Start: 2017-10-24 | End: 2017-10-26 | Stop reason: HOSPADM

## 2017-10-24 RX ORDER — AMOXICILLIN 250 MG
1 CAPSULE ORAL NIGHTLY
Status: DISCONTINUED | OUTPATIENT
Start: 2017-10-24 | End: 2017-10-26 | Stop reason: HOSPADM

## 2017-10-24 RX ORDER — ROSUVASTATIN CALCIUM 5 MG/1
5 TABLET, COATED ORAL EVERY EVENING
Status: DISCONTINUED | OUTPATIENT
Start: 2017-10-24 | End: 2017-10-26 | Stop reason: HOSPADM

## 2017-10-24 RX ORDER — POLYETHYLENE GLYCOL 3350 17 G/17G
1 POWDER, FOR SOLUTION ORAL 2 TIMES DAILY PRN
Status: DISCONTINUED | OUTPATIENT
Start: 2017-10-24 | End: 2017-10-26 | Stop reason: HOSPADM

## 2017-10-24 RX ORDER — CEFAZOLIN SODIUM 2 G/100ML
2 INJECTION, SOLUTION INTRAVENOUS EVERY 8 HOURS
Status: COMPLETED | OUTPATIENT
Start: 2017-10-24 | End: 2017-10-25

## 2017-10-24 RX ORDER — LIDOCAINE AND PRILOCAINE 25; 25 MG/G; MG/G
1 CREAM TOPICAL
Status: COMPLETED | OUTPATIENT
Start: 2017-10-24 | End: 2017-10-24

## 2017-10-24 RX ORDER — OXYCODONE HCL 5 MG/5 ML
SOLUTION, ORAL ORAL
Status: COMPLETED
Start: 2017-10-24 | End: 2017-10-24

## 2017-10-24 RX ORDER — LIDOCAINE HYDROCHLORIDE 10 MG/ML
0.5 INJECTION, SOLUTION INFILTRATION; PERINEURAL
Status: COMPLETED | OUTPATIENT
Start: 2017-10-24 | End: 2017-10-24

## 2017-10-24 RX ORDER — ENEMA 19; 7 G/133ML; G/133ML
1 ENEMA RECTAL
Status: DISCONTINUED | OUTPATIENT
Start: 2017-10-24 | End: 2017-10-26 | Stop reason: HOSPADM

## 2017-10-24 RX ORDER — QUINAPRIL 40 MG/1
40 TABLET ORAL DAILY
Status: DISCONTINUED | OUTPATIENT
Start: 2017-10-24 | End: 2017-10-26 | Stop reason: HOSPADM

## 2017-10-24 RX ORDER — DIPHENHYDRAMINE HYDROCHLORIDE 50 MG/ML
25 INJECTION INTRAMUSCULAR; INTRAVENOUS EVERY 6 HOURS PRN
Status: DISCONTINUED | OUTPATIENT
Start: 2017-10-24 | End: 2017-10-26 | Stop reason: HOSPADM

## 2017-10-24 RX ORDER — BISACODYL 10 MG
10 SUPPOSITORY, RECTAL RECTAL
Status: DISCONTINUED | OUTPATIENT
Start: 2017-10-24 | End: 2017-10-26 | Stop reason: HOSPADM

## 2017-10-24 RX ORDER — BUPIVACAINE HYDROCHLORIDE AND EPINEPHRINE 5; 5 MG/ML; UG/ML
INJECTION, SOLUTION EPIDURAL; INTRACAUDAL; PERINEURAL
Status: DISCONTINUED | OUTPATIENT
Start: 2017-10-24 | End: 2017-10-24 | Stop reason: HOSPADM

## 2017-10-24 RX ORDER — ALPRAZOLAM 0.25 MG/1
0.25 TABLET ORAL 2 TIMES DAILY PRN
Status: DISCONTINUED | OUTPATIENT
Start: 2017-10-24 | End: 2017-10-26 | Stop reason: HOSPADM

## 2017-10-24 RX ORDER — OXYCODONE HYDROCHLORIDE AND ACETAMINOPHEN 5; 325 MG/1; MG/1
1-2 TABLET ORAL EVERY 4 HOURS PRN
Status: DISCONTINUED | OUTPATIENT
Start: 2017-10-24 | End: 2017-10-26 | Stop reason: HOSPADM

## 2017-10-24 RX ORDER — LABETALOL HYDROCHLORIDE 5 MG/ML
10 INJECTION, SOLUTION INTRAVENOUS
Status: DISCONTINUED | OUTPATIENT
Start: 2017-10-24 | End: 2017-10-26 | Stop reason: HOSPADM

## 2017-10-24 RX ORDER — PANTOPRAZOLE SODIUM 40 MG/1
20 TABLET, DELAYED RELEASE ORAL DAILY
Status: DISCONTINUED | OUTPATIENT
Start: 2017-10-24 | End: 2017-10-24

## 2017-10-24 RX ORDER — ONDANSETRON 2 MG/ML
4 INJECTION INTRAMUSCULAR; INTRAVENOUS EVERY 4 HOURS PRN
Status: DISCONTINUED | OUTPATIENT
Start: 2017-10-24 | End: 2017-10-26 | Stop reason: HOSPADM

## 2017-10-24 RX ORDER — AMOXICILLIN 250 MG
1 CAPSULE ORAL
Status: DISCONTINUED | OUTPATIENT
Start: 2017-10-24 | End: 2017-10-26 | Stop reason: HOSPADM

## 2017-10-24 RX ORDER — OMEPRAZOLE 20 MG/1
20 CAPSULE, DELAYED RELEASE ORAL DAILY
Status: DISCONTINUED | OUTPATIENT
Start: 2017-10-24 | End: 2017-10-26 | Stop reason: HOSPADM

## 2017-10-24 RX ORDER — BACITRACIN 50000 [IU]/1
INJECTION, POWDER, FOR SOLUTION INTRAMUSCULAR
Status: DISCONTINUED | OUTPATIENT
Start: 2017-10-24 | End: 2017-10-24 | Stop reason: HOSPADM

## 2017-10-24 RX ORDER — CYCLOBENZAPRINE HCL 10 MG
10 TABLET ORAL EVERY 8 HOURS PRN
Status: DISCONTINUED | OUTPATIENT
Start: 2017-10-24 | End: 2017-10-26 | Stop reason: HOSPADM

## 2017-10-24 RX ORDER — DOXYCYCLINE 100 MG/1
50 TABLET ORAL DAILY
Status: DISCONTINUED | OUTPATIENT
Start: 2017-10-24 | End: 2017-10-24

## 2017-10-24 RX ORDER — SODIUM CHLORIDE 9 MG/ML
INJECTION, SOLUTION INTRAVENOUS
Status: DISPENSED
Start: 2017-10-24 | End: 2017-10-25

## 2017-10-24 RX ORDER — ACARBOSE 50 MG/1
50 TABLET ORAL 2 TIMES DAILY WITH MEALS
Status: DISCONTINUED | OUTPATIENT
Start: 2017-10-24 | End: 2017-10-26 | Stop reason: HOSPADM

## 2017-10-24 RX ORDER — CALCIUM CARBONATE 500 MG/1
500 TABLET, CHEWABLE ORAL 2 TIMES DAILY
Status: DISCONTINUED | OUTPATIENT
Start: 2017-10-24 | End: 2017-10-26 | Stop reason: HOSPADM

## 2017-10-24 RX ORDER — DOXYCYCLINE 100 MG/1
50 TABLET ORAL DAILY
Status: DISCONTINUED | OUTPATIENT
Start: 2017-10-24 | End: 2017-10-26 | Stop reason: HOSPADM

## 2017-10-24 RX ORDER — AMLODIPINE BESYLATE 5 MG/1
10 TABLET ORAL DAILY
Status: DISCONTINUED | OUTPATIENT
Start: 2017-10-25 | End: 2017-10-26 | Stop reason: HOSPADM

## 2017-10-24 RX ORDER — SODIUM CHLORIDE, SODIUM LACTATE, POTASSIUM CHLORIDE, AND CALCIUM CHLORIDE .6; .31; .03; .02 G/100ML; G/100ML; G/100ML; G/100ML
IRRIGANT IRRIGATION
Status: DISCONTINUED | OUTPATIENT
Start: 2017-10-24 | End: 2017-10-24 | Stop reason: HOSPADM

## 2017-10-24 RX ORDER — SODIUM CHLORIDE 9 MG/ML
INJECTION, SOLUTION INTRAVENOUS CONTINUOUS
Status: DISCONTINUED | OUTPATIENT
Start: 2017-10-24 | End: 2017-10-26 | Stop reason: HOSPADM

## 2017-10-24 RX ORDER — DOCUSATE SODIUM 100 MG/1
100 CAPSULE, LIQUID FILLED ORAL 2 TIMES DAILY
Status: DISCONTINUED | OUTPATIENT
Start: 2017-10-24 | End: 2017-10-26 | Stop reason: HOSPADM

## 2017-10-24 RX ORDER — ONDANSETRON 4 MG/1
4 TABLET, ORALLY DISINTEGRATING ORAL EVERY 4 HOURS PRN
Status: DISCONTINUED | OUTPATIENT
Start: 2017-10-24 | End: 2017-10-26 | Stop reason: HOSPADM

## 2017-10-24 RX ORDER — DIPHENHYDRAMINE HCL 25 MG
25 TABLET ORAL EVERY 6 HOURS PRN
Status: DISCONTINUED | OUTPATIENT
Start: 2017-10-24 | End: 2017-10-26 | Stop reason: HOSPADM

## 2017-10-24 RX ADMIN — STANDARDIZED SENNA CONCENTRATE AND DOCUSATE SODIUM 1 TABLET: 8.6; 5 TABLET, FILM COATED ORAL at 21:03

## 2017-10-24 RX ADMIN — DOCUSATE SODIUM 100 MG: 100 CAPSULE ORAL at 21:03

## 2017-10-24 RX ADMIN — ONDANSETRON 4 MG: 4 TABLET, ORALLY DISINTEGRATING ORAL at 16:15

## 2017-10-24 RX ADMIN — LIDOCAINE HYDROCHLORIDE 0.5 ML: 10 INJECTION, SOLUTION INFILTRATION; PERINEURAL at 08:20

## 2017-10-24 RX ADMIN — ROSUVASTATIN CALCIUM 5 MG: 5 TABLET ORAL at 21:03

## 2017-10-24 RX ADMIN — QUINAPRIL 40 MG: 40 TABLET ORAL at 21:03

## 2017-10-24 RX ADMIN — DOXYCYCLINE 50 MG: 100 TABLET ORAL at 21:03

## 2017-10-24 RX ADMIN — OXYCODONE HYDROCHLORIDE 5 MG: 5 SOLUTION ORAL at 11:30

## 2017-10-24 RX ADMIN — CEFAZOLIN SODIUM 2 G: 2 INJECTION, SOLUTION INTRAVENOUS at 23:51

## 2017-10-24 RX ADMIN — CEFAZOLIN SODIUM 2 G: 2 INJECTION, SOLUTION INTRAVENOUS at 15:22

## 2017-10-24 RX ADMIN — INSULIN LISPRO 1 UNITS: 100 INJECTION, SOLUTION INTRAVENOUS; SUBCUTANEOUS at 16:22

## 2017-10-24 RX ADMIN — SODIUM CHLORIDE: 9 INJECTION, SOLUTION INTRAVENOUS at 23:49

## 2017-10-24 RX ADMIN — INSULIN LISPRO 2 UNITS: 100 INJECTION, SOLUTION INTRAVENOUS; SUBCUTANEOUS at 21:13

## 2017-10-24 RX ADMIN — ONDANSETRON 4 MG: 2 INJECTION INTRAMUSCULAR; INTRAVENOUS at 11:00

## 2017-10-24 RX ADMIN — HYDROCODONE BITARTRATE AND ACETAMINOPHEN 2 TABLET: 10; 325 TABLET ORAL at 21:21

## 2017-10-24 RX ADMIN — ANTACID TABLETS 500 MG: 500 TABLET, CHEWABLE ORAL at 21:03

## 2017-10-24 RX ADMIN — HYDROCODONE BITARTRATE AND ACETAMINOPHEN 1 TABLET: 10; 325 TABLET ORAL at 16:12

## 2017-10-24 RX ADMIN — SODIUM CHLORIDE: 9 INJECTION, SOLUTION INTRAVENOUS at 13:58

## 2017-10-24 RX ADMIN — SODIUM CHLORIDE: 9 INJECTION, SOLUTION INTRAVENOUS at 08:20

## 2017-10-24 ASSESSMENT — PAIN SCALES - GENERAL
PAINLEVEL_OUTOF10: 4
PAINLEVEL_OUTOF10: 6
PAINLEVEL_OUTOF10: 5
PAINLEVEL_OUTOF10: 0
PAINLEVEL_OUTOF10: 6
PAINLEVEL_OUTOF10: 2
PAINLEVEL_OUTOF10: 0
PAINLEVEL_OUTOF10: 4
PAINLEVEL_OUTOF10: 0
PAINLEVEL_OUTOF10: 3
PAINLEVEL_OUTOF10: 0

## 2017-10-24 ASSESSMENT — COPD QUESTIONNAIRES
HAVE YOU SMOKED AT LEAST 100 CIGARETTES IN YOUR ENTIRE LIFE: NO/DON'T KNOW
COPD SCREENING SCORE: 2
DURING THE PAST 4 WEEKS HOW MUCH DID YOU FEEL SHORT OF BREATH: NONE/LITTLE OF THE TIME
DO YOU EVER COUGH UP ANY MUCUS OR PHLEGM?: NO/ONLY WITH OCCASIONAL COLDS OR INFECTIONS

## 2017-10-24 ASSESSMENT — COGNITIVE AND FUNCTIONAL STATUS - GENERAL
SUGGESTED CMS G CODE MODIFIER MOBILITY: CK
HELP NEEDED FOR BATHING: A LITTLE
DRESSING REGULAR UPPER BODY CLOTHING: A LITTLE
EATING MEALS: A LITTLE
MOVING TO AND FROM BED TO CHAIR: A LITTLE
DRESSING REGULAR LOWER BODY CLOTHING: A LITTLE
MOVING FROM LYING ON BACK TO SITTING ON SIDE OF FLAT BED: A LITTLE
TOILETING: A LITTLE
MOBILITY SCORE: 18
DAILY ACTIVITIY SCORE: 18
WALKING IN HOSPITAL ROOM: A LITTLE
PERSONAL GROOMING: A LITTLE
TURNING FROM BACK TO SIDE WHILE IN FLAT BAD: A LITTLE
STANDING UP FROM CHAIR USING ARMS: A LITTLE
CLIMB 3 TO 5 STEPS WITH RAILING: A LITTLE
SUGGESTED CMS G CODE MODIFIER DAILY ACTIVITY: CK

## 2017-10-24 ASSESSMENT — PATIENT HEALTH QUESTIONNAIRE - PHQ9
2. FEELING DOWN, DEPRESSED, IRRITABLE, OR HOPELESS: NOT AT ALL
SUM OF ALL RESPONSES TO PHQ9 QUESTIONS 1 AND 2: 0
1. LITTLE INTEREST OR PLEASURE IN DOING THINGS: NOT AT ALL
SUM OF ALL RESPONSES TO PHQ QUESTIONS 1-9: 0

## 2017-10-24 ASSESSMENT — LIFESTYLE VARIABLES
DO YOU DRINK ALCOHOL: NO
EVER_SMOKED: NEVER

## 2017-10-24 NOTE — PROGRESS NOTES
Two RN skin check completed with Rubi. Surgical dressing intact with scant drainage. All other skin intact at this time.

## 2017-10-24 NOTE — CARE PLAN
Problem: Safety  Goal: Will remain free from injury  Outcome: PROGRESSING AS EXPECTED  No falls this shift      Problem: Knowledge Deficit  Goal: Knowledge of disease process/condition, treatment plan, diagnostic tests, and medications will improve  Outcome: PROGRESSING AS EXPECTED  Pt will states understanding of treatment process

## 2017-10-24 NOTE — PROGRESS NOTES
Kayy Go Fall Risk Assessment:     Last Known Fall: No falls  Mobility: Use of assistive device/requires assist of two people  Medications: Cardiovascular or central nervous system meds  Mental Status/LOC/Awareness: Awake, alert, and oriented to date, place, and person  Toileting Needs: No needs  Volume/Electrolyte Status: Use of IV fluids/tube feeds  Communication/Sensory: Visual (Glasses)/hearing deficit  Behavior: Appropriate behavior  Kayy Go Fall Risk Total: 10  Fall Risk Level: LOW RISK    Universal Fall Precautions:  call light/belongings in reach, bed in low position and locked, wheelchairs and assistive devices out of sight, siderails up x 2, use non-slip footwear, adequate lighting, clutter free and spill free environment, educate on level of risk, educate to call for assistance    Fall Risk Level Interventions:   TRIAL (TELE 8, NEURO, MED BAO 5) Low Fall Risk Interventions  Place yellow fall risk ID band on patient: completed  Provide patient/family education based on risk assessment: completed  Educate patient/family to call staff for assistance when getting out of bed: completed  Place fall precaution signage outside patient door: completed      Patient Specific Interventions:     Medication: limit combination of prn medications  Mental Status/LOC/Awareness: reinforce falls education, check on patient hourly and utilize bed/chair fall alarm  Toileting: provide frquent toileting  Volume/Electrolyte Status: ensure patient remains hydrated  Communication/Sensory: update plan of care on whiteboard  Behavioral: encourage patient to voice feelings, engage patient in daily activities and administer medication as ordered  Mobility: dangle prior to standing and utilize bed/chair fall alarm

## 2017-10-24 NOTE — OP REPORT
DATE OF SERVICE:  10/24/2017    PREOPERATIVE DIAGNOSES:  1.  Bilateral neurogenic claudication.  2.  L2-L5 lumbar stenosis, bilateral recess stenosis.  3.  Bilateral L5 radiculopathies.  4.  Failed conservative care.    POSTOPERATIVE DIAGNOSES:  1.  Bilateral neurogenic claudication.  2.  L2-L5 lumbar stenosis, bilateral recess stenosis.  3.  Bilateral L5 radiculopathies.  4.  Failed conservative care.    PROCEDURES:  1.  L2 through L5 decompressive lumbar laminectomies with bilateral   foraminotomies.  2.  Left L4 transpedicular approach with far lateral decompression, left L4   nerve root.  3.  Left L5 transpedicular approach with far lateral decompression, left L5   nerve root.  4.  Modifier-22 for extra degree of difficulty given the patient's body mass   index of 35 with his comorbidities of diabetes, hypertension, and myocardial   infarction, all consistent with morbid obesity, which added an extra hour to   the standard surgical procedure.  5.  Microscope for microdissection of spinal canal.    SURGEON:  Lenny Lew MD, neurosurgery and spine surgery.    ASSISTANT:  Nick Lara PA-C    ANESTHESIA:  General endotracheal anesthesia.    ANESTHESIOLOGIST:  Osei Guo MD    COMPLICATIONS:  None.    ESTIMATED BLOOD LOSS:  Less than 100 mL    PREOPERATIVE NOTE:  This is a pleasant 72-year-old male who presents with low   back pain and bilateral neurogenic claudication with clinical evidence of   bilateral L5 radiculopathies.  MRI showed severe lumbar stenosis L2-L5 levels   with severe critical stenosis at L4-L5 level.  Patient failed extensive   conservative care.  I discussed surgical procedure, alternatives, goals, risks   and benefits, complications in detail with patient.  I answered all questions   to the best of my ability.  Patient understood and consented to surgery.    Details are well contained in the office visit notes.    NARRATIVE DICTATION:  Patient was brought to the operating  room and placed   under general endotracheal anesthesia.  He was placed prone on the operating   OSI table with care taken about the bony prominences and peripheral nerves.    Lumbar region was prepped and draped in usual sterile fashion.  Following   localization with cross table fluoroscopy, a small incision made from L2 to   superior aspect of L5, keeping the incision as small as possible and the   dorsal elements of L2-L5 were exposed in a subperiosteal fashion,   self-retaining retractors were applied, intraoperative x-ray confirmed   appropriate levels.  Once excellent exposure was achieved, decompressive   laminectomies of L2, L3, L4, and superior L5 were completed with double action   rongeur, Kerrison rongeur, Midas Jose Armando AM-8 drillbit, marked ligamentum   hypertrophy and facet arthropathy was undercut and removed bilaterally,   bilateral foraminotomies at L2-L5 were completed.  The microscope was used for   microdissection of spinal canal and microscope extensive bilateral   foraminotomies were completed from L2-L5.  There was severe stenosis on the   left side, which was most severe compression and hence I drilled down the left   L4 pedicle and the left L5 pedicle for transpedicular decompression, left   L4-L5 nerve root complexes.  This required extra degree of expertise, effort   and time, hence a modifier-59 is appropriate.  The edges of bone were bone   waxed.  Thrombin Gelfoam placed in epidural gutters for hemostasis.  Rivera   ball hook was easily placed over the exiting nerve roots.  Thecal sac and   nerve roots were nice and freed up throughout at this stage.    I placed an epidural catheter with Marcaine and fentanyl for postoperative   analgesia.  I infiltrated the muscle with Marcaine analgesia for pain control   and I then closed the wound over a drain brought out through a separate   incision with 0 Vicryl deep fascia, 2-0 Vicryl deep dermal layer, Steri-Strips   to skin.  A small sterile  dressing was applied.  Swabs, needles, instruments   correct by 2 count.  No complications were encountered.  Patient tolerated   procedure, was stable, and transferred to recovery.  Patient will be observed   at [facility] until he meets discharge criteria.    INTRAOPERATIVE FINDINGS:  Severe stenosis bilaterally L2-L5 levels, which was   freed up as described.  No complications were encountered.  Patient was   neurologically intact in the recovery room.  A modifier-22 is required for   extra degree of difficulty as described for the extra hour to the standard   surgical procedure.    Patient will follow up at [facility] in 2   weeks and 6 weeks' time as arranged.       ____________________________________     ELLEN MORA MD    JJL / GRADY    DD:  10/24/2017 10:52:10  DT:  10/24/2017 11:11:37    D#:  [id number]  Job#:  [id number]    cc: WOLF KENNEDY MD, _____ _____

## 2017-10-24 NOTE — PROGRESS NOTES
Pt is alert and oriented x4. Pt ambulated from the stretcher to the bed with no complications. Hemovac and dressing are dry and intact at this time. Pt had nausea and vomiting after eating lunch but pt declined need for mediation states that he would just like to rest. Pt also states that the pain was worse but refused intervention including ice at this time. Will continue to monitor pt closely.     Pt is currently resting peacefully.

## 2017-10-24 NOTE — OR SURGEON
Immediate Post OP Note    PreOp Diagnosis: L2-5 DDD, radiculopathy    PostOp Diagnosis: Same.     Procedure(s):  LUMBAR LAMINECTOMY DISKECTOMY L2-5 - Wound Class: Clean with Drain    Surgeon(s):  Lenny Lew M.D.    Anesthesiologist/Type of Anesthesia:  Anesthesiologist: Osei Guo M.D./General    Surgical Staff:  Assistant: Nick Lara P.A.-C.  Circulator: Melinda Henriquez R.N.  Scrub Person: Gordo Bryan  Radiology Technologist: Idania Mike; Yesica Ruano    Specimens:  * No specimens in log *    Estimated Blood Loss: 150 cc     Findings: L2-5 DDD, see dictation.     Complications: None.          10/24/2017 10:36 AM Nick Lara

## 2017-10-25 LAB
ANION GAP SERPL CALC-SCNC: 10 MMOL/L (ref 0–11.9)
BUN SERPL-MCNC: 15 MG/DL (ref 8–22)
CALCIUM SERPL-MCNC: 8.6 MG/DL (ref 8.5–10.5)
CHLORIDE SERPL-SCNC: 104 MMOL/L (ref 96–112)
CO2 SERPL-SCNC: 24 MMOL/L (ref 20–33)
CREAT SERPL-MCNC: 0.97 MG/DL (ref 0.5–1.4)
ERYTHROCYTE [DISTWIDTH] IN BLOOD BY AUTOMATED COUNT: 44 FL (ref 35.9–50)
GFR SERPL CREATININE-BSD FRML MDRD: >60 ML/MIN/1.73 M 2
GLUCOSE BLD-MCNC: 171 MG/DL (ref 65–99)
GLUCOSE BLD-MCNC: 177 MG/DL (ref 65–99)
GLUCOSE BLD-MCNC: 192 MG/DL (ref 65–99)
GLUCOSE BLD-MCNC: 216 MG/DL (ref 65–99)
GLUCOSE SERPL-MCNC: 186 MG/DL (ref 65–99)
HCT VFR BLD AUTO: 39.4 % (ref 42–52)
HGB BLD-MCNC: 13.6 G/DL (ref 14–18)
MCH RBC QN AUTO: 31.7 PG (ref 27–33)
MCHC RBC AUTO-ENTMCNC: 34.5 G/DL (ref 33.7–35.3)
MCV RBC AUTO: 91.8 FL (ref 81.4–97.8)
PLATELET # BLD AUTO: 172 K/UL (ref 164–446)
PMV BLD AUTO: 10.7 FL (ref 9–12.9)
POTASSIUM SERPL-SCNC: 4.1 MMOL/L (ref 3.6–5.5)
RBC # BLD AUTO: 4.29 M/UL (ref 4.7–6.1)
SODIUM SERPL-SCNC: 138 MMOL/L (ref 135–145)
WBC # BLD AUTO: 13.1 K/UL (ref 4.8–10.8)

## 2017-10-25 PROCEDURE — 94660 CPAP INITIATION&MGMT: CPT

## 2017-10-25 PROCEDURE — 82962 GLUCOSE BLOOD TEST: CPT | Mod: 91

## 2017-10-25 PROCEDURE — A9270 NON-COVERED ITEM OR SERVICE: HCPCS

## 2017-10-25 PROCEDURE — 700102 HCHG RX REV CODE 250 W/ 637 OVERRIDE(OP)

## 2017-10-25 PROCEDURE — 85027 COMPLETE CBC AUTOMATED: CPT

## 2017-10-25 PROCEDURE — 770006 HCHG ROOM/CARE - MED/SURG/GYN SEMI*

## 2017-10-25 PROCEDURE — 700102 HCHG RX REV CODE 250 W/ 637 OVERRIDE(OP): Performed by: PHYSICIAN ASSISTANT

## 2017-10-25 PROCEDURE — 700112 HCHG RX REV CODE 229: Performed by: PHYSICIAN ASSISTANT

## 2017-10-25 PROCEDURE — 36415 COLL VENOUS BLD VENIPUNCTURE: CPT

## 2017-10-25 PROCEDURE — A9270 NON-COVERED ITEM OR SERVICE: HCPCS | Performed by: PHYSICIAN ASSISTANT

## 2017-10-25 PROCEDURE — 80048 BASIC METABOLIC PNL TOTAL CA: CPT

## 2017-10-25 RX ORDER — CHLORPROMAZINE HYDROCHLORIDE 25 MG/1
25 TABLET, FILM COATED ORAL 4 TIMES DAILY PRN
Status: DISCONTINUED | OUTPATIENT
Start: 2017-10-25 | End: 2017-10-26 | Stop reason: HOSPADM

## 2017-10-25 RX ADMIN — INSULIN LISPRO 1 UNITS: 100 INJECTION, SOLUTION INTRAVENOUS; SUBCUTANEOUS at 11:34

## 2017-10-25 RX ADMIN — HYDROCODONE BITARTRATE AND ACETAMINOPHEN 1 TABLET: 10; 325 TABLET ORAL at 20:53

## 2017-10-25 RX ADMIN — CHLORPROMAZINE HYDROCHLORIDE 25 MG: 25 TABLET, SUGAR COATED ORAL at 11:46

## 2017-10-25 RX ADMIN — INSULIN LISPRO 1 UNITS: 100 INJECTION, SOLUTION INTRAVENOUS; SUBCUTANEOUS at 06:05

## 2017-10-25 RX ADMIN — ANTACID TABLETS 500 MG: 500 TABLET, CHEWABLE ORAL at 20:40

## 2017-10-25 RX ADMIN — HYDROCODONE BITARTRATE AND ACETAMINOPHEN 1 TABLET: 10; 325 TABLET ORAL at 15:24

## 2017-10-25 RX ADMIN — INSULIN LISPRO 2 UNITS: 100 INJECTION, SOLUTION INTRAVENOUS; SUBCUTANEOUS at 20:42

## 2017-10-25 RX ADMIN — DOCUSATE SODIUM 100 MG: 100 CAPSULE ORAL at 20:42

## 2017-10-25 RX ADMIN — DOXYCYCLINE 50 MG: 100 TABLET ORAL at 20:40

## 2017-10-25 RX ADMIN — HYDROCODONE BITARTRATE AND ACETAMINOPHEN 2 TABLET: 10; 325 TABLET ORAL at 02:45

## 2017-10-25 RX ADMIN — HYDROCODONE BITARTRATE AND ACETAMINOPHEN 1 TABLET: 10; 325 TABLET ORAL at 09:43

## 2017-10-25 RX ADMIN — AMLODIPINE BESYLATE 10 MG: 5 TABLET ORAL at 07:38

## 2017-10-25 RX ADMIN — STANDARDIZED SENNA CONCENTRATE AND DOCUSATE SODIUM 1 TABLET: 8.6; 5 TABLET, FILM COATED ORAL at 20:42

## 2017-10-25 RX ADMIN — DOCUSATE SODIUM 100 MG: 100 CAPSULE ORAL at 07:39

## 2017-10-25 RX ADMIN — QUINAPRIL 40 MG: 40 TABLET ORAL at 20:41

## 2017-10-25 RX ADMIN — ANTACID TABLETS 500 MG: 500 TABLET, CHEWABLE ORAL at 07:39

## 2017-10-25 RX ADMIN — INSULIN LISPRO 1 UNITS: 100 INJECTION, SOLUTION INTRAVENOUS; SUBCUTANEOUS at 17:10

## 2017-10-25 RX ADMIN — ROSUVASTATIN CALCIUM 5 MG: 5 TABLET ORAL at 20:42

## 2017-10-25 RX ADMIN — OMEPRAZOLE 20 MG: 20 CAPSULE, DELAYED RELEASE ORAL at 07:38

## 2017-10-25 ASSESSMENT — ENCOUNTER SYMPTOMS
BACK PAIN: 1
SENSORY CHANGE: 0
FOCAL WEAKNESS: 0

## 2017-10-25 ASSESSMENT — PAIN SCALES - GENERAL
PAINLEVEL_OUTOF10: 3
PAINLEVEL_OUTOF10: 2
PAINLEVEL_OUTOF10: 6
PAINLEVEL_OUTOF10: 3
PAINLEVEL_OUTOF10: 3
PAINLEVEL_OUTOF10: 5
PAINLEVEL_OUTOF10: 6
PAINLEVEL_OUTOF10: 2
PAINLEVEL_OUTOF10: 2

## 2017-10-25 NOTE — PROGRESS NOTES
Progress Note               Author: Krishna IKE Valencia Date & Time created: 10/25/2017  8:03 AM     Interval History:  POD#1 L2 - L5 laminectomy.  Doing well. Lower extremity pain improved.  Drain at 80cc    Review of Systems:  Review of Systems   Musculoskeletal: Positive for back pain.   Neurological: Negative for sensory change and focal weakness.       Physical Exam:  Physical Exam   Musculoskeletal:   Motor 5/5.    Neurological:   Sensory intact.    Skin: Skin is dry.       Labs:        Invalid input(s): [id number]      Recent Labs      10/25/17   0536   SODIUM  138   POTASSIUM  4.1   CHLORIDE  104   CO2  24   BUN  15   CREATININE  0.97   CALCIUM  8.6     Recent Labs      10/25/17   0536   GLUCOSE  186*     Recent Labs      10/25/17   0536   RBC  4.29*   HEMOGLOBIN  13.6*   HEMATOCRIT  39.4*   PLATELETCT  172     Recent Labs      10/25/17   05   WBC  13.1*     Hemodynamics:  Temp (24hrs), Av.8 °C (98.2 °F), Min:36.6 °C (97.9 °F), Max:37.1 °C (98.7 °F)  Temperature: 36.7 °C (98 °F)  Pulse  Av.8  Min: 57  Max: 78Heart Rate (Monitored): 66  Blood Pressure : 139/71, NIBP: 134/74     Respiratory:    Respiration: 16, Pulse Oximetry: 97 %           Fluids:    Intake/Output Summary (Last 24 hours) at 10/25/17 0803  Last data filed at 10/25/17 0600   Gross per 24 hour   Intake             2120 ml   Output              370 ml   Net             1750 ml     Weight: 101.7 kg (224 lb 3.3 oz)  GI/Nutrition:  Orders Placed This Encounter   Procedures   • DIET ORDER     Standing Status:   Standing     Number of Occurrences:   1     Order Specific Question:   Diet:     Answer:   Cardiac [6]     Order Specific Question:   Diet:     Answer:   Diabetic [3]     Medical Decision Making, by Problem:  Active Hospital Problems    Diagnosis   • Degeneration of lumbar intervertebral disc [M51.36]       Plan:  PT/OT  Plan home tomorrow.     Quality-Core Measures

## 2017-10-25 NOTE — PROGRESS NOTES
Pt is alert and oriented x4. States that pain is managed at this time. Pt educated on care plan for today, has no questions. Will continue to monitor pt.     Pt has had the hiccups since this morning. Spoke with TONIE Valencia who gave order for Thorazine based on pharmacy dosaging recommendation. Pharmacy to get back to nurse about dose.

## 2017-10-25 NOTE — PROGRESS NOTES
Kayy Go Fall Risk Assessment:     Last Known Fall: No falls  Mobility: Use of assistive device/requires assist of two people  Medications: Cardiovascular or central nervous system meds  Mental Status/LOC/Awareness: Awake, alert, and oriented to date, place, and person  Toileting Needs: No needs  Volume/Electrolyte Status: Use of IV fluids/tube feeds  Communication/Sensory: Visual (Glasses)/hearing deficit  Behavior: Appropriate behavior  Kayy Go Fall Risk Total: 10  Fall Risk Level: LOW RISK    Universal Fall Precautions:  call light/belongings in reach, bed in low position and locked, wheelchairs and assistive devices out of sight, siderails up x 2, use non-slip footwear, adequate lighting, clutter free and spill free environment, educate on level of risk, educate to call for assistance    Fall Risk Level Interventions:   TRIAL (TELE 8, NEURO, MED BAO 5) Low Fall Risk Interventions  Place yellow fall risk ID band on patient: verified  Provide patient/family education based on risk assessment: verified  Educate patient/family to call staff for assistance when getting out of bed: verified  Place fall precaution signage outside patient door: verified      Patient Specific Interventions:     Medication: not applicable  Mental Status/LOC/Awareness: reinforce the use of call light  Toileting: provide frquent toileting  Volume/Electrolyte Status: ensure patient remains hydrated  Communication/Sensory: update plan of care on whiteboard  Behavioral: not applicable  Mobility: utilize bed/chair fall alarm

## 2017-10-25 NOTE — CARE PLAN
Problem: Safety  Goal: Will remain free from falls  Outcome: PROGRESSING AS EXPECTED  Patient free of falls and fall precautions in place    Problem: Knowledge Deficit  Goal: Knowledge of the prescribed therapeutic regimen will improve  Outcome: PROGRESSING AS EXPECTED  Patient receptive to all information regarding his stay, surgery and treatments. Patient able to verbalize understanding.

## 2017-10-25 NOTE — RESPIRATORY CARE
Pt Cpap set up for pt.   Bipap/Cpap mask placed.  Can the patient demonstrate removal of mask? yes  If no, please notify physician.

## 2017-10-26 VITALS
DIASTOLIC BLOOD PRESSURE: 65 MMHG | TEMPERATURE: 98.9 F | HEIGHT: 67 IN | HEART RATE: 74 BPM | SYSTOLIC BLOOD PRESSURE: 151 MMHG | WEIGHT: 224.21 LBS | BODY MASS INDEX: 35.19 KG/M2 | OXYGEN SATURATION: 91 % | RESPIRATION RATE: 16 BRPM

## 2017-10-26 LAB
ANION GAP SERPL CALC-SCNC: 4 MMOL/L (ref 0–11.9)
BUN SERPL-MCNC: 16 MG/DL (ref 8–22)
CALCIUM SERPL-MCNC: 8.6 MG/DL (ref 8.5–10.5)
CHLORIDE SERPL-SCNC: 106 MMOL/L (ref 96–112)
CO2 SERPL-SCNC: 25 MMOL/L (ref 20–33)
CREAT SERPL-MCNC: 1.06 MG/DL (ref 0.5–1.4)
ERYTHROCYTE [DISTWIDTH] IN BLOOD BY AUTOMATED COUNT: 44.4 FL (ref 35.9–50)
GFR SERPL CREATININE-BSD FRML MDRD: >60 ML/MIN/1.73 M 2
GLUCOSE BLD-MCNC: 112 MG/DL (ref 65–99)
GLUCOSE BLD-MCNC: 122 MG/DL (ref 65–99)
GLUCOSE SERPL-MCNC: 104 MG/DL (ref 65–99)
HCT VFR BLD AUTO: 38 % (ref 42–52)
HGB BLD-MCNC: 13 G/DL (ref 14–18)
MCH RBC QN AUTO: 31.6 PG (ref 27–33)
MCHC RBC AUTO-ENTMCNC: 34.2 G/DL (ref 33.7–35.3)
MCV RBC AUTO: 92.5 FL (ref 81.4–97.8)
PLATELET # BLD AUTO: 161 K/UL (ref 164–446)
PMV BLD AUTO: 10.7 FL (ref 9–12.9)
POTASSIUM SERPL-SCNC: 3.9 MMOL/L (ref 3.6–5.5)
RBC # BLD AUTO: 4.11 M/UL (ref 4.7–6.1)
SODIUM SERPL-SCNC: 135 MMOL/L (ref 135–145)
WBC # BLD AUTO: 10.4 K/UL (ref 4.8–10.8)

## 2017-10-26 PROCEDURE — 97165 OT EVAL LOW COMPLEX 30 MIN: CPT

## 2017-10-26 PROCEDURE — G8987 SELF CARE CURRENT STATUS: HCPCS | Mod: CJ

## 2017-10-26 PROCEDURE — G8979 MOBILITY GOAL STATUS: HCPCS | Mod: CI

## 2017-10-26 PROCEDURE — 700102 HCHG RX REV CODE 250 W/ 637 OVERRIDE(OP): Performed by: PHYSICIAN ASSISTANT

## 2017-10-26 PROCEDURE — A9270 NON-COVERED ITEM OR SERVICE: HCPCS | Performed by: PHYSICIAN ASSISTANT

## 2017-10-26 PROCEDURE — 85027 COMPLETE CBC AUTOMATED: CPT

## 2017-10-26 PROCEDURE — 36415 COLL VENOUS BLD VENIPUNCTURE: CPT

## 2017-10-26 PROCEDURE — 700102 HCHG RX REV CODE 250 W/ 637 OVERRIDE(OP)

## 2017-10-26 PROCEDURE — G8978 MOBILITY CURRENT STATUS: HCPCS | Mod: CK

## 2017-10-26 PROCEDURE — 700112 HCHG RX REV CODE 229: Performed by: PHYSICIAN ASSISTANT

## 2017-10-26 PROCEDURE — A9270 NON-COVERED ITEM OR SERVICE: HCPCS

## 2017-10-26 PROCEDURE — G8988 SELF CARE GOAL STATUS: HCPCS | Mod: CI

## 2017-10-26 PROCEDURE — 80048 BASIC METABOLIC PNL TOTAL CA: CPT

## 2017-10-26 PROCEDURE — 82962 GLUCOSE BLOOD TEST: CPT | Mod: 91

## 2017-10-26 PROCEDURE — 97161 PT EVAL LOW COMPLEX 20 MIN: CPT

## 2017-10-26 RX ORDER — CYCLOBENZAPRINE HCL 10 MG
10 TABLET ORAL EVERY 8 HOURS PRN
Qty: 90 TAB | Refills: 0 | Status: ON HOLD | OUTPATIENT
Start: 2017-10-26 | End: 2018-02-14

## 2017-10-26 RX ORDER — HYDROCODONE BITARTRATE AND ACETAMINOPHEN 10; 325 MG/1; MG/1
1-2 TABLET ORAL EVERY 4 HOURS PRN
Qty: 100 TAB | Refills: 0 | Status: ON HOLD | OUTPATIENT
Start: 2017-10-26 | End: 2018-02-13

## 2017-10-26 RX ADMIN — AMLODIPINE BESYLATE 10 MG: 5 TABLET ORAL at 09:06

## 2017-10-26 RX ADMIN — MAGNESIUM HYDROXIDE 30 ML: 400 SUSPENSION ORAL at 09:07

## 2017-10-26 RX ADMIN — HYDROCODONE BITARTRATE AND ACETAMINOPHEN 1 TABLET: 10; 325 TABLET ORAL at 03:18

## 2017-10-26 RX ADMIN — HYDROCODONE BITARTRATE AND ACETAMINOPHEN 1 TABLET: 10; 325 TABLET ORAL at 09:31

## 2017-10-26 RX ADMIN — HYDROCODONE BITARTRATE AND ACETAMINOPHEN 1 TABLET: 10; 325 TABLET ORAL at 12:54

## 2017-10-26 RX ADMIN — CHLORPROMAZINE HYDROCHLORIDE 25 MG: 25 TABLET, SUGAR COATED ORAL at 09:05

## 2017-10-26 RX ADMIN — CYCLOBENZAPRINE HYDROCHLORIDE 10 MG: 10 TABLET, FILM COATED ORAL at 09:31

## 2017-10-26 RX ADMIN — DOCUSATE SODIUM 100 MG: 100 CAPSULE ORAL at 09:06

## 2017-10-26 RX ADMIN — ANTACID TABLETS 500 MG: 500 TABLET, CHEWABLE ORAL at 09:06

## 2017-10-26 RX ADMIN — OMEPRAZOLE 20 MG: 20 CAPSULE, DELAYED RELEASE ORAL at 09:06

## 2017-10-26 ASSESSMENT — PAIN SCALES - GENERAL
PAINLEVEL_OUTOF10: 6
PAINLEVEL_OUTOF10: 3
PAINLEVEL_OUTOF10: 6
PAINLEVEL_OUTOF10: 4
PAINLEVEL_OUTOF10: 6
PAINLEVEL_OUTOF10: 2

## 2017-10-26 ASSESSMENT — ENCOUNTER SYMPTOMS
SENSORY CHANGE: 0
FOCAL WEAKNESS: 0
HEADACHES: 0
BACK PAIN: 1

## 2017-10-26 ASSESSMENT — GAIT ASSESSMENTS
GAIT LEVEL OF ASSIST: CONTACT GUARD ASSIST
ASSISTIVE DEVICE: FRONT WHEEL WALKER
DISTANCE (FEET): 50

## 2017-10-26 ASSESSMENT — COGNITIVE AND FUNCTIONAL STATUS - GENERAL
TOILETING: A LITTLE
MOBILITY SCORE: 18
MOVING TO AND FROM BED TO CHAIR: A LITTLE
DAILY ACTIVITIY SCORE: 20
CLIMB 3 TO 5 STEPS WITH RAILING: A LITTLE
PERSONAL GROOMING: A LITTLE
STANDING UP FROM CHAIR USING ARMS: A LITTLE
SUGGESTED CMS G CODE MODIFIER MOBILITY: CK
SUGGESTED CMS G CODE MODIFIER DAILY ACTIVITY: CJ
HELP NEEDED FOR BATHING: A LITTLE
MOVING FROM LYING ON BACK TO SITTING ON SIDE OF FLAT BED: A LITTLE
DRESSING REGULAR LOWER BODY CLOTHING: A LITTLE
WALKING IN HOSPITAL ROOM: A LITTLE
TURNING FROM BACK TO SIDE WHILE IN FLAT BAD: A LITTLE

## 2017-10-26 ASSESSMENT — ACTIVITIES OF DAILY LIVING (ADL): TOILETING: INDEPENDENT

## 2017-10-26 NOTE — THERAPY
"Physical Therapy Evaluation completed.   Bed Mobility:  Supine to Sit:  (in chair on arrival)  Transfers: Sit to Stand: Contact Guard Assist  Gait: Level Of Assist: Contact Guard Assist with Front-Wheel Walker       Plan of Care: Will benefit from Physical Therapy 3 times per week  Discharge Recommendations: Equipment: No Equipment Needed. Post-acute therapy Discharge to home with outpatient or home health for additional skilled therapy services.    See \"Rehab Therapy-Acute\" Patient Summary Report for complete documentation.   Pt presents w/ recent fall this AM, pain, decreased activity toerlance, decreased balance limiting functionasl mobiltiy. pt will benefit from skilled acute PT services to address deficits. pt motivated to go home but did have a fall this Am after having pain in thigh which lead to B LE giving out. so during gait w/ PT pt took 50 ft x3 w/ sitting rest or leaning against wall due to starting thigh pain , which may be his warning sign that lead to LE giving out this AM. pt has 4ww and FWW at home and pt plans to use 4ww since ot has a place to sit if needed. pt left in chair after session. Rn notified   "

## 2017-10-26 NOTE — PROGRESS NOTES
Paged Nick Lara about drain out put.     Per Shola pt ok to be discharged without drain. Will remove drain once pt is awake from nap.     Pt drain has been removed and a new dressing has been placed. Pt aware that dressing should remain there for the next 24 hours and can be removed with a shower after that time frame. Discharge instructions have been given with  No questions at this time. Pt was ambulated to the bathroom prior to discharge. All lines and devices have been removed. Prescriptions have been given to pt.

## 2017-10-26 NOTE — CARE PLAN
Problem: Safety  Goal: Will remain free from injury  Outcome: PROGRESSING SLOWER THAN EXPECTED  No falls this shift due to safety measures in place     Problem: Bowel/Gastric:  Goal: Will not experience complications related to bowel motility  Outcome: PROGRESSING SLOWER THAN EXPECTED  Pt to have BM this shift

## 2017-10-26 NOTE — PROGRESS NOTES
"Progress Note               Author: Shola Lara Date & Time created: 10/26/2017  8:05 AM     Interval History:  POD#2 L2 - L5 laminectomy.  Doing well. Lower extremity pain improved.  Fell this morning following a walk when his legs \"went out on him.\"  He reports some incisional pain only, no real new lower extremity symptoms since his fall.  Denies saddle anesthesia or B&B changes.  Urinating fine, no BM yet, but passing flatus.  Mobilizing fine since fall.    Drain with 40 cc output.     Review of Systems:  Review of Systems   Musculoskeletal: Positive for back pain. Negative for joint pain.   Neurological: Negative for sensory change, focal weakness and headaches.       Physical Exam:  Physical Exam   Musculoskeletal:   Motor 5/5.    Neurological:   Sensory intact.    Skin: Skin is dry.   Dressing c/d/i.   Hemovac in place.        Labs:        Invalid input(s): [id number]      Recent Labs      10/25/17   0536  10/26/17   035   SODIUM  138  135   POTASSIUM  4.1  3.9   CHLORIDE  104  106   CO2  24  25   BUN  15  16   CREATININE  0.97  1.06   CALCIUM  8.6  8.6     Recent Labs      10/25/17   0536  10/26/17   035   GLUCOSE  186*  104*     Recent Labs      10/25/17   0536  10/26/17   0352   RBC  4.29*  4.11*   HEMOGLOBIN  13.6*  13.0*   HEMATOCRIT  39.4*  38.0*   PLATELETCT  172  161*     Recent Labs      10/25/17   0536  10/26/17   0352   WBC  13.1*  10.4     Hemodynamics:  Temp (24hrs), Av.7 °C (98 °F), Min:36.4 °C (97.6 °F), Max:36.9 °C (98.4 °F)  Temperature: 36.8 °C (98.3 °F)  Pulse  Av.8  Min: 57  Max: 78   Blood Pressure : 133/70     Respiratory:    Respiration: 16, Pulse Oximetry: 94 %           Fluids:    Intake/Output Summary (Last 24 hours) at 10/25/17 0803  Last data filed at 10/25/17 0600   Gross per 24 hour   Intake             2120 ml   Output              370 ml   Net             1750 ml        GI/Nutrition:  Orders Placed This Encounter   Procedures   • DIET ORDER     Standing Status:  "  Standing     Number of Occurrences:   1     Order Specific Question:   Diet:     Answer:   Cardiac [6]     Order Specific Question:   Diet:     Answer:   Diabetic [3]     Medical Decision Making, by Problem:  Active Hospital Problems    Diagnosis   • Degeneration of lumbar intervertebral disc [M51.36]       Plan:  Leave drain at 1/2 compression now.  PT/OT/ambulate as tolerated.  Will plan for d/c home early this afternoon as long as patient remains stable.  Patient has 4WW and FWW at home already.        Reviewed items::  Medications reviewed

## 2017-10-26 NOTE — PROGRESS NOTES
Kayy Go Fall Risk Assessment:     Last Known Fall: No falls  Mobility: Use of assistive device/requires assist of two people  Medications: Cardiovascular and central nervous system meds  Mental Status/LOC/Awareness: Awake, alert, and oriented to date, place, and person  Toileting Needs: No needs  Volume/Electrolyte Status: No problems  Communication/Sensory: Visual (Glasses)/hearing deficit  Behavior: Appropriate behavior  Kayy Go Fall Risk Total: 9  Fall Risk Level: LOW RISK    Universal Fall Precautions:  call light/belongings in reach, bed in low position and locked, wheelchairs and assistive devices out of sight, siderails up x 2, use non-slip footwear, educate on level of risk, clutter free and spill free environment, adequate lighting, educate to call for assistance    Fall Risk Level Interventions:   TRIAL (TELE 8, NEURO, MED BAO 5) Low Fall Risk Interventions  Place yellow fall risk ID band on patient: verified  Provide patient/family education based on risk assessment: completed  Educate patient/family to call staff for assistance when getting out of bed: completed  Place fall precaution signage outside patient door: verified      Patient Specific Interventions:     Medication: limit combination of prn medications  Mental Status/LOC/Awareness: reorient patient and reinforce falls education  Toileting: provide frquent toileting  Volume/Electrolyte Status: ensure patient remains hydrated  Communication/Sensory: ensure proper positioning when transferrng/ambulating  Behavioral: encourage patient to voice feelings  Mobility: dangle prior to standing

## 2017-10-26 NOTE — THERAPY
"Occupational Therapy Evaluation completed.   Functional Status:  Pt in chair on arrival finishing with Pt. Went over spinal precautions again and pt able to verbally recall them, physically demonstrated them throughout session. Spv for LB dressing with socks. CGA sit>stand w/FWW and CGA w/FWW for mobiltiy d/t new onset of sudden leg weakness. Pt ambulates well, but has LOB when trying to lower on toilet. Currently needs min A for toilet txf to help stabilize. Sitting at sink for grooming, BUE use. Pt left with wife at sink to complete shaving and RN aware.  Plan of Care: Will benefit from Occupational Therapy 3 times per week  Discharge Recommendations:  Equipment: Front-Wheel Walker and Will Continue to Assess for Equipment Needs. Post-acute therapy Discharge to home with outpatient or home health for additional skilled therapy services.    71 y/o male s/p lumbar surgery. Pt has been experiencing BLE sudden weakness post surgery and experienced a fall this morning while walking. Pt demonstrates good safety awareness and adherence to spinal precautions, but does appear nervous about recent fall. Displays decreased balance forrest with toilet txf and also states he is nervous about sitting on stool in shower at home. Recommended practicing sitting on toilet and in shower upon returning home with wife there to assist. Pt could benefit from acute OT and HH services post d/c to focus on functional transfers and safe completion of ADLs.    See \"Rehab Therapy-Acute\" Patient Summary Report for complete documentation.    "

## 2017-10-26 NOTE — PROGRESS NOTES
Pt is alert and oriented x4. Has pain in his back after working with physical therapy. PRN medications given along with an ice pack. Pt aware of safety measures in place. Hemovac drain still in place set to half compression as ordered. Will continue to monitor pt.

## 2017-10-26 NOTE — DISCHARGE SUMMARY
DATE OF ADMISSION:  10/24/2017    DATE OF DISCHARGE:  10/26/2017    DISCHARGE DIAGNOSES:  1.  Low back pain.  2.  Bilateral lower extremity pain.  3.  Gait difficulty.    SURGERY PERFORMED:  Open L2 through L5 laminectomies performed by Dr. Lenny Lew.    COMPLICATIONS:  None.    REASON FOR ADMISSION:  This is a pleasant 72-year-old male well known to our   office as he did undergo a prior C3-C4 and C6-C7 ACDF by Dr. Lew in March of 2017.  He did well following this; however, he does have a long history of   low back pain and lower extremity pain with symptoms of neurogenic   claudication.  His preoperative workup showed significant lumbar stenosis at   L2-L3, L3-L4 and L4-L5 without evidence of instability.  His symptoms were   refractory to conservative care including physical therapy and was hereby   indicated for the above surgery.    HOSPITAL COURSE:  The patient was admitted on the date of surgery.  He   underwent the above surgery without complication and was moved to the   neurosciences floor.  Here in the neurosciences floor, he has made a good   recovery postoperatively.  Here on postoperative day #2, his pain is   controlled with oral medications.  He is ambulating.  He is voiding.  He is   tolerating oral food and medications well.  He did unfortunately have a small   fall while walking this morning.  He denies any new lower extremity symptoms   following this fall.  He has some incisional back pain only that has remained.    He is ambulating and voiding fine.  His motor exam is 5/5.  His incision is   clean, dry, and intact and he is hereby cleared for discharge to home later   today after relatively uneventful hospital stay.    DISCHARGE INSTRUCTIONS:  The patient is to eat a normal diet as tolerated.  He   is to walk as much as tolerated.  He is to avoid repetitive bending at the   waist.  He is to avoid lifting, pushing or pulling greater than 15 pounds.  It   will be okay for the patient to  drive in 2 weeks' time or when he is   comfortable not taking narcotic pain medications.  He should take an   over-the-counter stool softener while taking narcotic pain medications.  He   should avoid NSAIDs for the next week.  He is encouraged to ice his incision   for 10-15 minutes multiple times per day.  He does have follow up appointments   in the office of [facility] in 2 and 6 weeks' time.  He is to keep those   appointments.  He should call our office or go to the nearest emergency   department with any emergent changes.  The patient was given these discharge   instructions verbally and he voiced understanding of them.    DISCHARGE MEDICATIONS:  In addition to the patient's normal home medications,   he will be discharged to home on:  1.  [place]or 10/325 one to two tabs p.o. q. 4-6 hours p.r.n. pain, dispensed   #100, no refills.  2.  Flexeril 10 mg 1 tab p.o. t.i.d. p.r.n. spasm, dispensed #90, no refills.       ____________________________________     SOULEYMANE LOPEZ / GRADY    DD:  10/26/2017 08:16:03  DT:  10/26/2017 08:50:53    D#:  [id number]  Job#:  [id number]    cc: WOLF KENNEDY MD, [facility]

## 2017-10-26 NOTE — DISCHARGE INSTRUCTIONS
Discharge Instructions    Discharged to home by car with relative. Discharged via wheelchair, hospital escort: Yes.  Special equipment needed:     No NSAIDS or Ibuprofen for one week  Avoid repetitive lifting, pulling or pushing of 15lbs or more.   Follow up in 2-6 weeks.  May shower 24 hours after surgical drain has been removed.   Call if you have any questions.     Be sure to schedule a follow-up appointment with your primary care doctor or any specialists as instructed.     Discharge Plan:   Pneumococcal Vaccine Given - only chart on this line when given:  (not needed per pharmacy, recieved already this year )  Influenza Vaccine Indication: Not indicated: Previously immunized this influenza season and > 8 years of age    I understand that a diet low in cholesterol, fat, and sodium is recommended for good health. Unless I have been given specific instructions below for another diet, I accept this instruction as my diet prescription.   Other diet: Cardiac, Diabetic diet         · Is patient discharged on Warfarin / Coumadin?   No     · Is patient Post Blood Transfusion?  No    Depression / Suicide Risk    As you are discharged from this [facility] facility, it is important to learn how to keep safe from harming yourself.    Recognize the warning signs:  · Abrupt changes in personality, positive or negative- including increase in energy   · Giving away possessions  · Change in eating patterns- significant weight changes-  positive or negative  · Change in sleeping patterns- unable to sleep or sleeping all the time   · Unwillingness or inability to communicate  · Depression  · Unusual sadness, discouragement and loneliness  · Talk of wanting to die  · Neglect of personal appearance   · Rebelliousness- reckless behavior  · Withdrawal from people/activities they love  · Confusion- inability to concentrate     If you or a loved one observes any of these behaviors or has concerns about self-harm, here's what you can  do:  · Talk about it- your feelings and reasons for harming yourself  · Remove any means that you might use to hurt yourself (examples: pills, rope, extension cords, firearm)  · Get professional help from the community (Mental Health, Substance Abuse, psychological counseling)  · Do not be alone:Call your Safe Contact- someone whom you trust who will be there for you.  · Call your local CRISIS HOTLINE 195-3556 or 825-164-0435  · Call your local Children's Mobile Crisis Response Team [place] (447) 715-7494 or www.[employer]  · Call the toll free National Suicide Prevention Hotlines   · National Suicide Prevention Lifeline 668-761-LMRL (6359)  · National Hope Line Network 800-SUICIDE (638-9162)

## 2017-10-26 NOTE — PROGRESS NOTES
Kayy Go Fall Risk Assessment:     Last Known Fall: No falls  Mobility: Use of assistive device/requires assist of two people  Medications: Cardiovascular and central nervous system meds  Mental Status/LOC/Awareness: Awake, alert, and oriented to date, place, and person  Toileting Needs: No needs  Volume/Electrolyte Status: No problems  Communication/Sensory: Visual (Glasses)/hearing deficit  Behavior: Appropriate behavior  Kayy Go Fall Risk Total: 9  Fall Risk Level: LOW RISK    Universal Fall Precautions:  bed in low position and locked, call light/belongings in reach, wheelchairs and assistive devices out of sight, siderails up x 2, use non-slip footwear, adequate lighting, clutter free and spill free environment, educate on level of risk, educate to call for assistance    Fall Risk Level Interventions:   TRIAL (TELE 8, NEURO, MED BAO 5) Low Fall Risk Interventions  Place yellow fall risk ID band on patient: verified  Provide patient/family education based on risk assessment: completed  Educate patient/family to call staff for assistance when getting out of bed: completed  Place fall precaution signage outside patient door: verified      Patient Specific Interventions:     Medication: review medications with patient and family  Mental Status/LOC/Awareness: reinforce falls education, check on patient hourly, utilize bed/chair fall alarm and reinforce the use of call light  Toileting: provide frquent toileting and monitor intake and output/use of appropriate interventions  Volume/Electrolyte Status: ensure patient remains hydrated  Communication/Sensory: update plan of care on whiteboard  Behavioral: encourage patient to voice feelings, administer medication as ordered and instruct/reinforce fall program rationale  Mobility: dangle prior to standing, utilize bed/chair fall alarm and ensure bed is locked and in lowest position

## 2018-01-12 ENCOUNTER — HOSPITAL ENCOUNTER (OUTPATIENT)
Facility: MEDICAL CENTER | Age: 73
End: 2018-01-12
Attending: NEUROLOGICAL SURGERY | Admitting: NEUROLOGICAL SURGERY
Payer: COMMERCIAL

## 2018-01-26 ENCOUNTER — HOSPITAL ENCOUNTER (OUTPATIENT)
Facility: MEDICAL CENTER | Age: 73
End: 2018-01-26
Attending: NEUROLOGICAL SURGERY | Admitting: NEUROLOGICAL SURGERY
Payer: COMMERCIAL

## 2018-02-13 ENCOUNTER — APPOINTMENT (OUTPATIENT)
Dept: RADIOLOGY | Facility: MEDICAL CENTER | Age: 73
End: 2018-02-13
Attending: NEUROLOGICAL SURGERY
Payer: COMMERCIAL

## 2018-02-13 ENCOUNTER — HOSPITAL ENCOUNTER (OUTPATIENT)
Facility: MEDICAL CENTER | Age: 73
End: 2018-02-14
Attending: NEUROLOGICAL SURGERY | Admitting: NEUROLOGICAL SURGERY
Payer: COMMERCIAL

## 2018-02-13 DIAGNOSIS — M54.5 ACUTE LOW BACK PAIN, UNSPECIFIED BACK PAIN LATERALITY, WITH SCIATICA PRESENCE UNSPECIFIED: ICD-10-CM

## 2018-02-13 DIAGNOSIS — M51.9 LUMBAR DISC DISEASE: Chronic | ICD-10-CM

## 2018-02-13 PROBLEM — M54.50 LOW BACK PAIN: Status: ACTIVE | Noted: 2018-02-13

## 2018-02-13 LAB
GLUCOSE BLD-MCNC: 133 MG/DL (ref 65–99)
GLUCOSE BLD-MCNC: 183 MG/DL (ref 65–99)
GLUCOSE BLD-MCNC: 220 MG/DL (ref 65–99)

## 2018-02-13 PROCEDURE — A9270 NON-COVERED ITEM OR SERVICE: HCPCS

## 2018-02-13 PROCEDURE — 96376 TX/PRO/DX INJ SAME DRUG ADON: CPT

## 2018-02-13 PROCEDURE — 95937 NEUROMUSCULAR JUNCTION TEST: CPT | Performed by: NEUROLOGICAL SURGERY

## 2018-02-13 PROCEDURE — 82962 GLUCOSE BLOOD TEST: CPT | Mod: 91

## 2018-02-13 PROCEDURE — 160002 HCHG RECOVERY MINUTES (STAT): Performed by: NEUROLOGICAL SURGERY

## 2018-02-13 PROCEDURE — 500367 HCHG DRAIN KIT, HEMOVAC: Performed by: NEUROLOGICAL SURGERY

## 2018-02-13 PROCEDURE — 95940 IONM IN OPERATNG ROOM 15 MIN: CPT | Performed by: NEUROLOGICAL SURGERY

## 2018-02-13 PROCEDURE — 700111 HCHG RX REV CODE 636 W/ 250 OVERRIDE (IP)

## 2018-02-13 PROCEDURE — 95861 NEEDLE EMG 2 EXTREMITIES: CPT | Performed by: NEUROLOGICAL SURGERY

## 2018-02-13 PROCEDURE — G0378 HOSPITAL OBSERVATION PER HR: HCPCS

## 2018-02-13 PROCEDURE — 500885 HCHG PACK, JACKSON TABLE: Performed by: NEUROLOGICAL SURGERY

## 2018-02-13 PROCEDURE — 72020 X-RAY EXAM OF SPINE 1 VIEW: CPT

## 2018-02-13 PROCEDURE — 501838 HCHG SUTURE GENERAL: Performed by: NEUROLOGICAL SURGERY

## 2018-02-13 PROCEDURE — 110454 HCHG SHELL REV 250: Performed by: NEUROLOGICAL SURGERY

## 2018-02-13 PROCEDURE — 160009 HCHG ANES TIME/MIN: Performed by: NEUROLOGICAL SURGERY

## 2018-02-13 PROCEDURE — 700111 HCHG RX REV CODE 636 W/ 250 OVERRIDE (IP): Performed by: PHYSICIAN ASSISTANT

## 2018-02-13 PROCEDURE — 700101 HCHG RX REV CODE 250

## 2018-02-13 PROCEDURE — 95925 SOMATOSENSORY TESTING: CPT | Performed by: NEUROLOGICAL SURGERY

## 2018-02-13 PROCEDURE — 700101 HCHG RX REV CODE 250: Performed by: PHYSICIAN ASSISTANT

## 2018-02-13 PROCEDURE — 96375 TX/PRO/DX INJ NEW DRUG ADDON: CPT

## 2018-02-13 PROCEDURE — 160036 HCHG PACU - EA ADDL 30 MINS PHASE I: Performed by: NEUROLOGICAL SURGERY

## 2018-02-13 PROCEDURE — 700102 HCHG RX REV CODE 250 W/ 637 OVERRIDE(OP): Performed by: PHYSICIAN ASSISTANT

## 2018-02-13 PROCEDURE — 160041 HCHG SURGERY MINUTES - EA ADDL 1 MIN LEVEL 4: Performed by: NEUROLOGICAL SURGERY

## 2018-02-13 PROCEDURE — 160048 HCHG OR STATISTICAL LEVEL 1-5: Performed by: NEUROLOGICAL SURGERY

## 2018-02-13 PROCEDURE — 700102 HCHG RX REV CODE 250 W/ 637 OVERRIDE(OP)

## 2018-02-13 PROCEDURE — 96374 THER/PROPH/DIAG INJ IV PUSH: CPT

## 2018-02-13 PROCEDURE — A9270 NON-COVERED ITEM OR SERVICE: HCPCS | Performed by: PHYSICIAN ASSISTANT

## 2018-02-13 PROCEDURE — 160035 HCHG PACU - 1ST 60 MINS PHASE I: Performed by: NEUROLOGICAL SURGERY

## 2018-02-13 PROCEDURE — 160029 HCHG SURGERY MINUTES - 1ST 30 MINS LEVEL 4: Performed by: NEUROLOGICAL SURGERY

## 2018-02-13 RX ORDER — OMEPRAZOLE 20 MG/1
20 CAPSULE, DELAYED RELEASE ORAL DAILY
Status: DISCONTINUED | OUTPATIENT
Start: 2018-02-13 | End: 2018-02-14 | Stop reason: HOSPADM

## 2018-02-13 RX ORDER — ONDANSETRON 2 MG/ML
4 INJECTION INTRAMUSCULAR; INTRAVENOUS EVERY 4 HOURS PRN
Status: DISCONTINUED | OUTPATIENT
Start: 2018-02-13 | End: 2018-02-14 | Stop reason: HOSPADM

## 2018-02-13 RX ORDER — IBUPROFEN 200 MG
200-400 TABLET ORAL 2 TIMES DAILY PRN
Status: ON HOLD | COMMUNITY
End: 2018-02-14

## 2018-02-13 RX ORDER — QUINAPRIL 40 MG/1
40 TABLET ORAL
Status: DISCONTINUED | OUTPATIENT
Start: 2018-02-13 | End: 2018-02-14 | Stop reason: HOSPADM

## 2018-02-13 RX ORDER — LIDOCAINE AND PRILOCAINE 25; 25 MG/G; MG/G
1 CREAM TOPICAL
Status: ACTIVE | OUTPATIENT
Start: 2018-02-13 | End: 2018-02-14

## 2018-02-13 RX ORDER — LABETALOL HYDROCHLORIDE 5 MG/ML
10 INJECTION, SOLUTION INTRAVENOUS
Status: DISCONTINUED | OUTPATIENT
Start: 2018-02-13 | End: 2018-02-14 | Stop reason: HOSPADM

## 2018-02-13 RX ORDER — DOXYCYCLINE 100 MG/1
50 TABLET ORAL DAILY
Status: DISCONTINUED | OUTPATIENT
Start: 2018-02-13 | End: 2018-02-13

## 2018-02-13 RX ORDER — ACETAMINOPHEN 500 MG
TABLET ORAL
Status: COMPLETED
Start: 2018-02-13 | End: 2018-02-13

## 2018-02-13 RX ORDER — SODIUM CHLORIDE 9 MG/ML
1000 INJECTION, SOLUTION INTRAVENOUS
Status: COMPLETED | OUTPATIENT
Start: 2018-02-13 | End: 2018-02-13

## 2018-02-13 RX ORDER — ACARBOSE 50 MG/1
50 TABLET ORAL 2 TIMES DAILY WITH MEALS
Status: DISCONTINUED | OUTPATIENT
Start: 2018-02-13 | End: 2018-02-14 | Stop reason: HOSPADM

## 2018-02-13 RX ORDER — AMOXICILLIN 250 MG
1 CAPSULE ORAL NIGHTLY
Status: DISCONTINUED | OUTPATIENT
Start: 2018-02-13 | End: 2018-02-14 | Stop reason: HOSPADM

## 2018-02-13 RX ORDER — AMLODIPINE BESYLATE 5 MG/1
10 TABLET ORAL DAILY
Status: DISCONTINUED | OUTPATIENT
Start: 2018-02-14 | End: 2018-02-14 | Stop reason: HOSPADM

## 2018-02-13 RX ORDER — CLONIDINE HYDROCHLORIDE 0.1 MG/1
0.1 TABLET ORAL EVERY 4 HOURS PRN
Status: DISCONTINUED | OUTPATIENT
Start: 2018-02-13 | End: 2018-02-14 | Stop reason: HOSPADM

## 2018-02-13 RX ORDER — CEFAZOLIN SODIUM 2 G/100ML
2 INJECTION, SOLUTION INTRAVENOUS EVERY 8 HOURS
Status: COMPLETED | OUTPATIENT
Start: 2018-02-13 | End: 2018-02-14

## 2018-02-13 RX ORDER — PANTOPRAZOLE SODIUM 40 MG/1
40 TABLET, DELAYED RELEASE ORAL DAILY
Status: DISCONTINUED | OUTPATIENT
Start: 2018-02-13 | End: 2018-02-13

## 2018-02-13 RX ORDER — AMOXICILLIN 250 MG
1 CAPSULE ORAL
Status: DISCONTINUED | OUTPATIENT
Start: 2018-02-13 | End: 2018-02-14 | Stop reason: HOSPADM

## 2018-02-13 RX ORDER — POLYETHYLENE GLYCOL 3350 17 G/17G
1 POWDER, FOR SOLUTION ORAL 2 TIMES DAILY PRN
Status: DISCONTINUED | OUTPATIENT
Start: 2018-02-13 | End: 2018-02-14 | Stop reason: HOSPADM

## 2018-02-13 RX ORDER — HYDROCODONE BITARTRATE AND ACETAMINOPHEN 10; 325 MG/1; MG/1
1 TABLET ORAL EVERY 4 HOURS PRN
Status: ON HOLD | COMMUNITY
End: 2018-02-14

## 2018-02-13 RX ORDER — LIDOCAINE HYDROCHLORIDE 10 MG/ML
0.5 INJECTION, SOLUTION INFILTRATION; PERINEURAL
Status: ACTIVE | OUTPATIENT
Start: 2018-02-13 | End: 2018-02-14

## 2018-02-13 RX ORDER — HYDROMORPHONE HYDROCHLORIDE 2 MG/ML
.5-1 INJECTION, SOLUTION INTRAMUSCULAR; INTRAVENOUS; SUBCUTANEOUS
Status: DISCONTINUED | OUTPATIENT
Start: 2018-02-13 | End: 2018-02-14 | Stop reason: HOSPADM

## 2018-02-13 RX ORDER — QUINAPRIL 40 MG/1
40 TABLET ORAL DAILY
Status: DISCONTINUED | OUTPATIENT
Start: 2018-02-13 | End: 2018-02-13

## 2018-02-13 RX ORDER — ONDANSETRON 4 MG/1
4 TABLET, ORALLY DISINTEGRATING ORAL EVERY 4 HOURS PRN
Status: DISCONTINUED | OUTPATIENT
Start: 2018-02-13 | End: 2018-02-14 | Stop reason: HOSPADM

## 2018-02-13 RX ORDER — BACITRACIN 50000 [IU]/1
INJECTION, POWDER, FOR SOLUTION INTRAMUSCULAR
Status: DISCONTINUED | OUTPATIENT
Start: 2018-02-13 | End: 2018-02-13 | Stop reason: HOSPADM

## 2018-02-13 RX ORDER — DIPHENHYDRAMINE HYDROCHLORIDE 50 MG/ML
25 INJECTION INTRAMUSCULAR; INTRAVENOUS EVERY 6 HOURS PRN
Status: DISCONTINUED | OUTPATIENT
Start: 2018-02-13 | End: 2018-02-14 | Stop reason: HOSPADM

## 2018-02-13 RX ORDER — OXYCODONE HYDROCHLORIDE 10 MG/1
10 TABLET ORAL EVERY 4 HOURS PRN
Status: DISCONTINUED | OUTPATIENT
Start: 2018-02-13 | End: 2018-02-14 | Stop reason: HOSPADM

## 2018-02-13 RX ORDER — DIPHENHYDRAMINE HCL 25 MG
25 TABLET ORAL EVERY 6 HOURS PRN
Status: DISCONTINUED | OUTPATIENT
Start: 2018-02-13 | End: 2018-02-14 | Stop reason: HOSPADM

## 2018-02-13 RX ORDER — BUPIVACAINE HYDROCHLORIDE AND EPINEPHRINE 5; 5 MG/ML; UG/ML
INJECTION, SOLUTION EPIDURAL; INTRACAUDAL; PERINEURAL
Status: DISCONTINUED | OUTPATIENT
Start: 2018-02-13 | End: 2018-02-13 | Stop reason: HOSPADM

## 2018-02-13 RX ORDER — OXYCODONE HYDROCHLORIDE 5 MG/1
5 TABLET ORAL EVERY 4 HOURS PRN
Status: DISCONTINUED | OUTPATIENT
Start: 2018-02-13 | End: 2018-02-14 | Stop reason: HOSPADM

## 2018-02-13 RX ORDER — CALCIUM CARBONATE 500 MG/1
500 TABLET, CHEWABLE ORAL 2 TIMES DAILY
Status: DISCONTINUED | OUTPATIENT
Start: 2018-02-13 | End: 2018-02-14 | Stop reason: HOSPADM

## 2018-02-13 RX ORDER — BISACODYL 10 MG
10 SUPPOSITORY, RECTAL RECTAL
Status: DISCONTINUED | OUTPATIENT
Start: 2018-02-13 | End: 2018-02-14 | Stop reason: HOSPADM

## 2018-02-13 RX ORDER — DOCUSATE SODIUM 100 MG/1
100 CAPSULE, LIQUID FILLED ORAL 2 TIMES DAILY
Status: DISCONTINUED | OUTPATIENT
Start: 2018-02-13 | End: 2018-02-14 | Stop reason: HOSPADM

## 2018-02-13 RX ORDER — M-VIT,TX,IRON,MINS/CALC/FOLIC 27MG-0.4MG
1 TABLET ORAL DAILY
COMMUNITY

## 2018-02-13 RX ORDER — DEXTROSE MONOHYDRATE 25 G/50ML
25 INJECTION, SOLUTION INTRAVENOUS
Status: DISCONTINUED | OUTPATIENT
Start: 2018-02-13 | End: 2018-02-14 | Stop reason: HOSPADM

## 2018-02-13 RX ORDER — CELECOXIB 200 MG/1
CAPSULE ORAL
Status: COMPLETED
Start: 2018-02-13 | End: 2018-02-13

## 2018-02-13 RX ORDER — OXYCODONE HYDROCHLORIDE 5 MG/1
5-10 TABLET ORAL EVERY 4 HOURS PRN
Status: DISCONTINUED | OUTPATIENT
Start: 2018-02-13 | End: 2018-02-13

## 2018-02-13 RX ORDER — HYDRALAZINE HYDROCHLORIDE 20 MG/ML
10 INJECTION INTRAMUSCULAR; INTRAVENOUS
Status: DISCONTINUED | OUTPATIENT
Start: 2018-02-13 | End: 2018-02-14 | Stop reason: HOSPADM

## 2018-02-13 RX ORDER — ALPRAZOLAM 0.25 MG/1
0.25 TABLET ORAL 2 TIMES DAILY PRN
Status: DISCONTINUED | OUTPATIENT
Start: 2018-02-13 | End: 2018-02-14 | Stop reason: HOSPADM

## 2018-02-13 RX ORDER — ROSUVASTATIN CALCIUM 5 MG/1
5 TABLET, COATED ORAL EVERY EVENING
Status: DISCONTINUED | OUTPATIENT
Start: 2018-02-13 | End: 2018-02-14 | Stop reason: HOSPADM

## 2018-02-13 RX ORDER — SODIUM CHLORIDE AND POTASSIUM CHLORIDE 150; 900 MG/100ML; MG/100ML
INJECTION, SOLUTION INTRAVENOUS CONTINUOUS
Status: DISCONTINUED | OUTPATIENT
Start: 2018-02-13 | End: 2018-02-14 | Stop reason: HOSPADM

## 2018-02-13 RX ORDER — HYDROCODONE BITARTRATE AND ACETAMINOPHEN 10; 325 MG/1; MG/1
1-2 TABLET ORAL EVERY 6 HOURS PRN
Status: DISCONTINUED | OUTPATIENT
Start: 2018-02-13 | End: 2018-02-14 | Stop reason: HOSPADM

## 2018-02-13 RX ORDER — DOXYCYCLINE 100 MG/1
50 TABLET ORAL
Status: DISCONTINUED | OUTPATIENT
Start: 2018-02-13 | End: 2018-02-14 | Stop reason: HOSPADM

## 2018-02-13 RX ORDER — CYCLOBENZAPRINE HCL 10 MG
10 TABLET ORAL EVERY 8 HOURS PRN
Status: DISCONTINUED | OUTPATIENT
Start: 2018-02-13 | End: 2018-02-14 | Stop reason: HOSPADM

## 2018-02-13 RX ORDER — ENEMA 19; 7 G/133ML; G/133ML
1 ENEMA RECTAL
Status: DISCONTINUED | OUTPATIENT
Start: 2018-02-13 | End: 2018-02-14 | Stop reason: HOSPADM

## 2018-02-13 RX ADMIN — HYDROCODONE BITARTRATE AND ACETAMINOPHEN 1 TABLET: 10; 325 TABLET ORAL at 16:55

## 2018-02-13 RX ADMIN — FENTANYL CITRATE 50 MCG: 50 INJECTION, SOLUTION INTRAMUSCULAR; INTRAVENOUS at 12:34

## 2018-02-13 RX ADMIN — ANTACID TABLETS 500 MG: 500 TABLET, CHEWABLE ORAL at 20:03

## 2018-02-13 RX ADMIN — FENTANYL CITRATE 25 MCG: 50 INJECTION, SOLUTION INTRAMUSCULAR; INTRAVENOUS at 11:45

## 2018-02-13 RX ADMIN — ONDANSETRON 4 MG: 2 INJECTION INTRAMUSCULAR; INTRAVENOUS at 19:56

## 2018-02-13 RX ADMIN — HYDROCODONE BITARTRATE AND ACETAMINOPHEN 1 TABLET: 10; 325 TABLET ORAL at 21:49

## 2018-02-13 RX ADMIN — INSULIN HUMAN 2 UNITS: 100 INJECTION, SOLUTION PARENTERAL at 20:15

## 2018-02-13 RX ADMIN — ROSUVASTATIN CALCIUM 5 MG: 5 TABLET ORAL at 20:02

## 2018-02-13 RX ADMIN — STANDARDIZED SENNA CONCENTRATE AND DOCUSATE SODIUM 1 TABLET: 8.6; 5 TABLET, FILM COATED ORAL at 20:02

## 2018-02-13 RX ADMIN — ACETAMINOPHEN 1000 MG: 500 TABLET, FILM COATED ORAL at 08:00

## 2018-02-13 RX ADMIN — DOXYCYCLINE 50 MG: 100 TABLET ORAL at 20:02

## 2018-02-13 RX ADMIN — INSULIN HUMAN 1 UNITS: 100 INJECTION, SOLUTION PARENTERAL at 17:21

## 2018-02-13 RX ADMIN — HYDROCODONE BITARTRATE AND ACETAMINOPHEN 1 TABLET: 10; 325 TABLET ORAL at 14:32

## 2018-02-13 RX ADMIN — SODIUM CHLORIDE 1000 ML: 9 INJECTION, SOLUTION INTRAVENOUS at 08:26

## 2018-02-13 RX ADMIN — POTASSIUM CHLORIDE AND SODIUM CHLORIDE: 900; 150 INJECTION, SOLUTION INTRAVENOUS at 14:32

## 2018-02-13 RX ADMIN — QUINAPRIL 40 MG: 40 TABLET ORAL at 20:03

## 2018-02-13 RX ADMIN — ONDANSETRON 4 MG: 2 INJECTION INTRAMUSCULAR; INTRAVENOUS at 13:47

## 2018-02-13 RX ADMIN — CELECOXIB 400 MG: 200 CAPSULE ORAL at 08:00

## 2018-02-13 RX ADMIN — CEFAZOLIN SODIUM 2 G: 2 INJECTION, SOLUTION INTRAVENOUS at 17:04

## 2018-02-13 RX ADMIN — ACARBOSE 50 MG: 50 TABLET ORAL at 17:04

## 2018-02-13 ASSESSMENT — PAIN SCALES - GENERAL
PAINLEVEL_OUTOF10: 3
PAINLEVEL_OUTOF10: 4
PAINLEVEL_OUTOF10: 0
PAINLEVEL_OUTOF10: ASSUMED PAIN PRESENT
PAINLEVEL_OUTOF10: 1
PAINLEVEL_OUTOF10: 6
PAINLEVEL_OUTOF10: 2

## 2018-02-13 ASSESSMENT — PATIENT HEALTH QUESTIONNAIRE - PHQ9
1. LITTLE INTEREST OR PLEASURE IN DOING THINGS: NOT AT ALL
2. FEELING DOWN, DEPRESSED, IRRITABLE, OR HOPELESS: NOT AT ALL
SUM OF ALL RESPONSES TO PHQ QUESTIONS 1-9: 0
SUM OF ALL RESPONSES TO PHQ9 QUESTIONS 1 AND 2: 0

## 2018-02-13 ASSESSMENT — LIFESTYLE VARIABLES: DO YOU DRINK ALCOHOL: NO

## 2018-02-13 NOTE — OR SURGEON
Immediate Post OP Note    PreOp Diagnosis:  Right L3-4 synovial cyst, Left L4-5 synovial cyst, Lumbar stenosis, L3-5, Lumbar radiculopathy    PostOp Diagnosis: Same    Procedure(s):  LUMBAR LAMINECTOMY DISKECTOMY- FOR REDO L3-5 LAMINOTOMIES W/SYNOVIAL CYST RESECTION - Wound Class: Clean with Drain    Surgeon(s):  Lenny Lew M.D.    Anesthesiologist/Type of Anesthesia:  Anesthesiologist: Feliciano Li M.D./General    Surgical Staff:  Assistant: Kati Garza P.A.-C.  Circulator: Idania Muñoz R.N.  Relief Circulator: Josie Vidal R.N.  Scrub Person: Paco Mir  Radiology Technologist: Idania Mike    Specimens:  * No specimens in log *    Estimated Blood Loss: 75ccs    Findings:  See op report    Complications: None        2/13/2018 11:02 AM Kati Garza

## 2018-02-14 VITALS
HEIGHT: 67 IN | BODY MASS INDEX: 35.64 KG/M2 | WEIGHT: 227.07 LBS | TEMPERATURE: 98.8 F | HEART RATE: 76 BPM | RESPIRATION RATE: 16 BRPM | OXYGEN SATURATION: 98 % | SYSTOLIC BLOOD PRESSURE: 159 MMHG | DIASTOLIC BLOOD PRESSURE: 54 MMHG

## 2018-02-14 LAB
ANION GAP SERPL CALC-SCNC: 10 MMOL/L (ref 0–11.9)
BUN SERPL-MCNC: 16 MG/DL (ref 8–22)
CALCIUM SERPL-MCNC: 8.4 MG/DL (ref 8.5–10.5)
CHLORIDE SERPL-SCNC: 104 MMOL/L (ref 96–112)
CO2 SERPL-SCNC: 21 MMOL/L (ref 20–33)
CREAT SERPL-MCNC: 1.14 MG/DL (ref 0.5–1.4)
ERYTHROCYTE [DISTWIDTH] IN BLOOD BY AUTOMATED COUNT: 47.5 FL (ref 35.9–50)
GLUCOSE BLD-MCNC: 177 MG/DL (ref 65–99)
GLUCOSE BLD-MCNC: 212 MG/DL (ref 65–99)
GLUCOSE SERPL-MCNC: 219 MG/DL (ref 65–99)
HCT VFR BLD AUTO: 40.2 % (ref 42–52)
HGB BLD-MCNC: 14 G/DL (ref 14–18)
MCH RBC QN AUTO: 32.3 PG (ref 27–33)
MCHC RBC AUTO-ENTMCNC: 34.8 G/DL (ref 33.7–35.3)
MCV RBC AUTO: 92.6 FL (ref 81.4–97.8)
PLATELET # BLD AUTO: 159 K/UL (ref 164–446)
PMV BLD AUTO: 10.7 FL (ref 9–12.9)
POTASSIUM SERPL-SCNC: 4.7 MMOL/L (ref 3.6–5.5)
RBC # BLD AUTO: 4.34 M/UL (ref 4.7–6.1)
SODIUM SERPL-SCNC: 135 MMOL/L (ref 135–145)
WBC # BLD AUTO: 13 K/UL (ref 4.8–10.8)

## 2018-02-14 PROCEDURE — G8988 SELF CARE GOAL STATUS: HCPCS | Mod: CI

## 2018-02-14 PROCEDURE — 700111 HCHG RX REV CODE 636 W/ 250 OVERRIDE (IP): Performed by: PHYSICIAN ASSISTANT

## 2018-02-14 PROCEDURE — G0378 HOSPITAL OBSERVATION PER HR: HCPCS

## 2018-02-14 PROCEDURE — 97165 OT EVAL LOW COMPLEX 30 MIN: CPT

## 2018-02-14 PROCEDURE — 700112 HCHG RX REV CODE 229: Performed by: PHYSICIAN ASSISTANT

## 2018-02-14 PROCEDURE — G8979 MOBILITY GOAL STATUS: HCPCS | Mod: CH

## 2018-02-14 PROCEDURE — 85027 COMPLETE CBC AUTOMATED: CPT

## 2018-02-14 PROCEDURE — 82962 GLUCOSE BLOOD TEST: CPT

## 2018-02-14 PROCEDURE — G8978 MOBILITY CURRENT STATUS: HCPCS | Mod: CH

## 2018-02-14 PROCEDURE — 36415 COLL VENOUS BLD VENIPUNCTURE: CPT

## 2018-02-14 PROCEDURE — 80048 BASIC METABOLIC PNL TOTAL CA: CPT

## 2018-02-14 PROCEDURE — 700102 HCHG RX REV CODE 250 W/ 637 OVERRIDE(OP): Performed by: PHYSICIAN ASSISTANT

## 2018-02-14 PROCEDURE — G8980 MOBILITY D/C STATUS: HCPCS | Mod: CH

## 2018-02-14 PROCEDURE — G8987 SELF CARE CURRENT STATUS: HCPCS | Mod: CJ

## 2018-02-14 PROCEDURE — 700101 HCHG RX REV CODE 250: Performed by: PHYSICIAN ASSISTANT

## 2018-02-14 PROCEDURE — 96376 TX/PRO/DX INJ SAME DRUG ADON: CPT

## 2018-02-14 PROCEDURE — 97161 PT EVAL LOW COMPLEX 20 MIN: CPT

## 2018-02-14 PROCEDURE — A9270 NON-COVERED ITEM OR SERVICE: HCPCS | Performed by: PHYSICIAN ASSISTANT

## 2018-02-14 RX ORDER — CYCLOBENZAPRINE HCL 10 MG
10 TABLET ORAL EVERY 8 HOURS PRN
Qty: 60 TAB | Refills: 0 | Status: SHIPPED | OUTPATIENT
Start: 2018-02-14 | End: 2019-09-20

## 2018-02-14 RX ORDER — DOXYCYCLINE HYCLATE 100 MG
100 TABLET ORAL 2 TIMES DAILY
Qty: 10 TAB | Refills: 0 | Status: SHIPPED | OUTPATIENT
Start: 2018-02-14 | End: 2021-06-10 | Stop reason: CLARIF

## 2018-02-14 RX ORDER — HYDROCODONE BITARTRATE AND ACETAMINOPHEN 10; 325 MG/1; MG/1
1-2 TABLET ORAL EVERY 6 HOURS PRN
Qty: 90 TAB | Refills: 0 | Status: SHIPPED | OUTPATIENT
Start: 2018-02-14 | End: 2018-02-28

## 2018-02-14 RX ADMIN — HYDROCODONE BITARTRATE AND ACETAMINOPHEN 1 TABLET: 10; 325 TABLET ORAL at 11:22

## 2018-02-14 RX ADMIN — DOCUSATE SODIUM 100 MG: 100 CAPSULE ORAL at 09:07

## 2018-02-14 RX ADMIN — POTASSIUM CHLORIDE AND SODIUM CHLORIDE: 900; 150 INJECTION, SOLUTION INTRAVENOUS at 02:41

## 2018-02-14 RX ADMIN — INSULIN HUMAN 1 UNITS: 100 INJECTION, SOLUTION PARENTERAL at 11:20

## 2018-02-14 RX ADMIN — ANTACID TABLETS 500 MG: 500 TABLET, CHEWABLE ORAL at 09:07

## 2018-02-14 RX ADMIN — AMLODIPINE BESYLATE 10 MG: 5 TABLET ORAL at 09:07

## 2018-02-14 RX ADMIN — HYDROCODONE BITARTRATE AND ACETAMINOPHEN 1 TABLET: 10; 325 TABLET ORAL at 05:29

## 2018-02-14 RX ADMIN — CEFAZOLIN SODIUM 2 G: 2 INJECTION, SOLUTION INTRAVENOUS at 02:41

## 2018-02-14 RX ADMIN — OMEPRAZOLE 20 MG: 20 CAPSULE, DELAYED RELEASE ORAL at 05:29

## 2018-02-14 RX ADMIN — INSULIN HUMAN 2 UNITS: 100 INJECTION, SOLUTION PARENTERAL at 05:24

## 2018-02-14 ASSESSMENT — PAIN SCALES - GENERAL
PAINLEVEL_OUTOF10: 4
PAINLEVEL_OUTOF10: 2
PAINLEVEL_OUTOF10: 3

## 2018-02-14 ASSESSMENT — ACTIVITIES OF DAILY LIVING (ADL): TOILETING: INDEPENDENT

## 2018-02-14 ASSESSMENT — COGNITIVE AND FUNCTIONAL STATUS - GENERAL
SUGGESTED CMS G CODE MODIFIER MOBILITY: CH
MOBILITY SCORE: 24
DAILY ACTIVITIY SCORE: 23
SUGGESTED CMS G CODE MODIFIER DAILY ACTIVITY: CI
HELP NEEDED FOR BATHING: A LITTLE

## 2018-02-14 ASSESSMENT — GAIT ASSESSMENTS
ASSISTIVE DEVICE: FRONT WHEEL WALKER
GAIT LEVEL OF ASSIST: SUPERVISED
DISTANCE (FEET): 300

## 2018-02-14 ASSESSMENT — LIFESTYLE VARIABLES: EVER_SMOKED: YES

## 2018-02-14 NOTE — PROGRESS NOTES
Discharge instructions and RXs given to pt and wife. All questions answered. Pt leaving via wheelchair with hospital escort and wife. All belongings packed. Waiting on transport.

## 2018-02-14 NOTE — PROGRESS NOTES
Assumed care of patient at 1900 from alexsandra Rn. Patient was sitting upright in chair with IV fluids infusing continuously and with hemovac to compressed suction and had just finished up dinner. Patient had complaints of nausea and requested for some ginger ale. Introduced self to patient and explained POC and said I would return with his request. Addressed any other needs while still in patient's room. All fall risk precautions in place and call light was placed within patient's reach.

## 2018-02-14 NOTE — DISCHARGE INSTRUCTIONS
Discharge Instructions    Discharged to home by car with relative. Discharged via wheelchair, hospital escort: Yes.  Special equipment needed: Not Applicable    Be sure to schedule a follow-up appointment with your primary care doctor or any specialists as instructed.     Discharge Plan:   Influenza Vaccine Indication: Not indicated: Previously immunized this influenza season and > 8 years of age    I understand that a diet low in cholesterol, fat, and sodium is recommended for good health. Unless I have been given specific instructions below for another diet, I accept this instruction as my diet prescription.   Other diet: regular    Special Instructions:     Return to clinic Thursday for drain removal   Change dressing in 48 hours   May shower in 48 hours   No bending/lifting/twisting   No driving on pain medication   Return to ER with Chest pain, Shortness of breath, Leg swelling    · Is patient discharged on Warfarin / Coumadin?   No     Depression / Suicide Risk    As you are discharged from this [facility] facility, it is important to learn how to keep safe from harming yourself.    Recognize the warning signs:  · Abrupt changes in personality, positive or negative- including increase in energy   · Giving away possessions  · Change in eating patterns- significant weight changes-  positive or negative  · Change in sleeping patterns- unable to sleep or sleeping all the time   · Unwillingness or inability to communicate  · Depression  · Unusual sadness, discouragement and loneliness  · Talk of wanting to die  · Neglect of personal appearance   · Rebelliousness- reckless behavior  · Withdrawal from people/activities they love  · Confusion- inability to concentrate     If you or a loved one observes any of these behaviors or has concerns about self-harm, here's what you can do:  · Talk about it- your feelings and reasons for harming yourself  · Remove any means that you might use to hurt yourself (examples:  pills, rope, extension cords, firearm)  · Get professional help from the community (Mental Health, Substance Abuse, psychological counseling)  · Do not be alone:Call your Safe Contact- someone whom you trust who will be there for you.  · Call your local CRISIS HOTLINE 059-7406 or 854-268-2268  · Call your local Children's Mobile Crisis Response Team [place] (106) 712-1186 or www.[employer]  · Call the toll free National Suicide Prevention Hotlines   · National Suicide Prevention Lifeline 193-861-CFNM (7490)  · National Hope Line Network 800-SUICIDE (395-1047)

## 2018-02-14 NOTE — PROGRESS NOTES
Received report from night shift RN. Assumed care of patient. Pt assessed and stable. VSS. Patient reports 2/10 pain at this time.  Discussed plan of care for day with patient and received verbal understanding. Call light within reach, strip alarm active, bed in low position.

## 2018-02-14 NOTE — PROGRESS NOTES
Neurosurgery Progress Note    Subjective:  POD1 L3-5 lamis/facet cyst resection  Denies leg pain  Low back pain tolerable  Drain at 90cc/8 hours  Wants to go home   Has not worked with PT yet    Exam:  Incision c/d/i  Motor 5/5 bilateral LEs  Drain intact   No calf tenderness    BP  Min: 107/46  Max: 159/54  Pulse  Av.9  Min: 65  Max: 83  Resp  Avg: 15.6  Min: 12  Max: 18  Temp  Av.8 °C (98.2 °F)  Min: 36.3 °C (97.3 °F)  Max: 37.2 °C (98.9 °F)  SpO2  Av.9 %  Min: 92 %  Max: 98 %    No Data Recorded    Recent Labs      18   0309   WBC  13.0*   RBC  4.34*   HEMOGLOBIN  14.0   HEMATOCRIT  40.2*   MCV  92.6   MCH  32.3   MCHC  34.8   RDW  47.5   PLATELETCT  159*   MPV  10.7     Recent Labs      18   0309   SODIUM  135   POTASSIUM  4.7   CHLORIDE  104   CO2  21   GLUCOSE  219*   BUN  16               Intake/Output       18 0700 - 18 0659 18 0700 - 02/15/18 0659      [id number] 6641-6317 Total 1402-2543 4052-5168 Total       Intake    P.O.  270  -- 270  --  -- --    P.O. 270 -- 270 -- -- --    I.V.  1550  -- 1550  --  -- --    Crystalloid Intake 1000 -- 1000 -- -- --    IV Volume (IV Lactated Ringers) 550 -- 550 -- -- --    Total Intake 1820 -- 1820 -- -- --       Output    Drains  170  240 410  --  -- --    Hemovac 1 170 240 410 -- -- --    Stool  --  -- --  --  -- --    Number of Times Stooled 0 x -- 0 x -- -- --    Blood  25  -- 25  --  -- --    Est. Blood Loss (mL) 25 -- 25 -- -- --    Total Output 195 240 435 -- -- --       Net I/O     1625 -240 1385 -- -- --            Intake/Output Summary (Last 24 hours) at 18 08  Last data filed at 18 0400   Gross per 24 hour   Intake             1820 ml   Output              435 ml   Net             1385 ml            • acarbose  50 mg BID WITH MEALS   • amLODIPine  10 mg DAILY   • rosuvastatin  5 mg Q EVENING   • docusate sodium  100 mg BID   • senna-docusate  1 Tab Nightly   • senna-docusate  1 Tab Q24HRS PRN   •  polyethylene glycol/lytes  1 Packet BID PRN   • magnesium hydroxide  30 mL QDAY PRN   • bisacodyl  10 mg Q24HRS PRN   • fleet  1 Each Once PRN   • HYDROcodone/acetaminophen  1-2 Tab Q6HRS PRN   • HYDROmorphone  0.5-1 mg Q3HRS PRN   • insulin regular  1-6 Units 4X/DAY ACHS    And   • glucose 4 g  16 g Q15 MIN PRN    And   • dextrose 50%  25 mL Q15 MIN PRN   • 0.9 % NaCl with KCl 20 mEq 1,000 mL   Continuous   • diphenhydrAMINE  25 mg Q6HRS PRN    Or   • diphenhydrAMINE  25 mg Q6HRS PRN   • ondansetron  4 mg Q4HRS PRN   • ondansetron  4 mg Q4HRS PRN   • cyclobenzaprine  10 mg Q8HRS PRN   • ALPRAZolam  0.25 mg BID PRN   • labetalol  10 mg Q HOUR PRN   • hydrALAZINE  10 mg Q HOUR PRN   • cloNIDine  0.1 mg Q4HRS PRN   • benzocaine-menthol  1 Lozenge Q2HRS PRN   • calcium carbonate  500 mg BID   • oxyCODONE immediate-release  5 mg Q4HRS PRN    Or   • oxyCODONE immediate-release  10 mg Q4HRS PRN   • omeprazole  20 mg DAILY   • doxycycline monohydrate  50 mg QHS   • quinapril  40 mg QHS       Assessment and Plan:  POD #1  D/c home if cleared by PT with drain in place  Drain to half suction  Return to clinic tomorrow for drain removal

## 2018-02-14 NOTE — THERAPY
"Physical Therapy Evaluation completed.   Bed Mobility:  Supine to Sit: Supervised  Transfers: Sit to Stand: Supervised  Gait: Level Of Assist: Supervised with Front-Wheel Walker       Plan of Care: Patient with no further skilled PT needs in the acute care setting at this time  Discharge Recommendations: Equipment: Front-Wheel Walker. Post-acute therapy Discharge to home with outpatient or home health for additional skilled therapy services.    See \"Rehab Therapy-Acute\" Patient Summary Report for complete documentation.       Pt. presents with steady safe gait w/ FWW and no signs of LOB and also able to complete flight of stairs with alternating pattern and no signs of LOB.  Pt. reports he is ambulating better than before surgery and feels safe to go home.  Pt. educated on precautions.  Pt. with no further skilled acute PT needs at this time.   "

## 2018-02-14 NOTE — CARE PLAN
Problem: Communication  Goal: The ability to communicate needs accurately and effectively will improve    Intervention: [place] patient and significant other/support system to call light to alert staff of needs  Oriented patient to room and how to use the call light to notify staff of needs.       Problem: Safety  Goal: Will remain free from injury    Intervention: Provide assistance with mobility  Educated patient to use the call light and wait for staff before getting up out of bed. Patient verbalized understanding.

## 2018-02-14 NOTE — DISCHARGE SUMMARY
DATE OF ADMISSION:  02/13/2018    DATE OF DISCHARGE:  02/14/2018    ADMISSION DIAGNOSES:  1.  Low back pain.  2.  Lumbar spinal stenosis, L3-L5.  3.  Facet cyst, right side, L3-L4; left side, L4-L5.  4.  Neurogenic claudication.  5.  Remote history of open laminectomies on 10/24/2017, L2-L5.    DISCHARGE DIAGNOSES:  1.  Low back pain.  2.  Lumbar spinal stenosis, L3-L5.  3.  Facet cyst, right side, L3-L4; left side, L4-L5.  4.  Neurogenic claudication.  5.  Remote history of open laminectomies on 10/24/2017, L2-L5.  6.  Status post open L3-L5 redo laminectomies and facet cyst resection.    HOSPITAL COURSE:  The patient is a pleasant 72-year-old male well known to Dr. Lew.  He underwent open L2-L5 laminectomies in October 2017 and did   reasonably well for short period of time.  Unfortunately, he then developed   significant difficulty standing and walking as well as weakness in the legs,   worse on the left than the right.  After much discussion and failure of   nonoperative measures, he elected to proceed with surgical intervention.  For   details of the surgery, please see Dr. Lew's operative report.    Postoperatively, he was mobilized with PT and OT.  His pain was controlled   with oral and IV analgesia.  His incision remained clean, dry and intact.  He   noted complete resolution of his lower extremity symptoms and back pain,   commensurate with the surgery.  At the time of possible discharge, he awaits   evaluation with physical therapy.  His drain output is at 90 over 8 hours and   will remain in place.  If he is cleared by physical therapy for discharge, he   will be discharged later today.    PHYSICAL EXAMINATION:  VITAL SIGNS:  Stable.  He is afebrile.  NEUROLOGIC:  He is intact.    DISCHARGE INSTRUCTIONS:  Follow up with [facility] as previously arranged.    No bending, lifting, twisting.  Take pain medication as prescribed.  No   driving.  Return to the ER with chest pain, shortness breath,  fever, chills,   leg or arm swelling.  Ice incision frequently.  Stool softeners p.r.n.  Return   to clinic tomorrow for drain removal, drain instructions provided by RN.  The   above represents brief summary of this patient's hospital stay.  For details,   please see the medical chart.    DISCHARGE PRESCRIPTIONS:  Include Flexeril 10 mg tab 1 p.o. q. 8 hours p.r.n.   spasm, doxycycline 100 mg tab 1 p.o. b.i.d. and Norco 10/325 one to two p.o.   q. 6 hours p.r.n. severe pain, #90, 0 refills.       ____________________________________     SOULEYMANE Jung / GRADY    DD:  02/14/2018 08:39:27  DT:  02/14/2018 10:46:22    D#:  [id number]  Job#:  [id number]    cc: WOLF KENNEDY MD

## 2018-02-14 NOTE — THERAPY
"Occupational Therapy Evaluation completed.   Functional Status:  OT eval completed on 71 YO M s/p L4-5 DDD. Pt demonstrated ADLs and functional transfers with SBA/SPV. Pt slightly impulsive however appears to be baseline. Pt limited due to decreased safety awareness and would benefit additional OT sessions in order to reinforce spinal precautions and home mods. Will continue to follow for acute OT services while in-house.  Plan of Care: Will benefit from Occupational Therapy 5 times per week  Discharge Recommendations:  Equipment: Will Continue to Assess for Equipment Needs. Post-acute therapy Discharge to home with outpatient or home health for additional skilled therapy services.    See \"Rehab Therapy-Acute\" Patient Summary Report for complete documentation.    "

## 2018-02-20 NOTE — OP REPORT
DATE OF SERVICE:  02/13/2018    PREOPERATIVE DIAGNOSES:  1.  Bilateral neurogenic claudication.  2.  Left L4-L5 radiculopathies.  3.  Status post prior open laminectomies.  4.  Failed conservative care.  5.  Left L4-L5 and right L3-L4 synovial cyst.  6.  Failed conservative care.    POSTOPERATIVE DIAGNOSES:  1.  Bilateral neurogenic claudication.  2.  Left L4-L5 radiculopathies.  3.  Status post prior open laminectomies.  4.  Failed conservative care.  5.  Left L4-L5 and right L3-L4 synovial cyst.  6.  Failed conservative care.    PROCEDURES:  1.  Bilateral redo L3 laminotomies, foraminotomies, decompression of bilateral   L3 nerve roots.  2.  Bilateral L4 laminotomies, foraminotomies, decompression of bilateral L4   nerve roots.  3.  Bilateral L5 laminotomies, foraminotomies, decompression of bilateral L5   nerve roots.  4.  Left L4 transpedicular approach, far lateral decompression of left L4   nerve root.  5.  Left L5 transpedicular approach, far lateral decompression of left L5   nerve root.  6.  Resection of left L4-L5 synovial cyst of the facet joint and resection of   the right L3-L4 synovial cyst.  7.  Modifier-22 for extra degree of difficulty, complexity and time given the   patient's body mass index of 36 with comorbidities, diabetes and hypertension   consistent with morbid obesity.  8.  Microscope for microdissection of spinal canal.  9.  SSEP, EMG monitoring performed by neuro monitoring associates, which   remained stable throughout.    SURGEON:  Lenny Lew MD, neurosurgery-spine surgery.    ASSISTANT:  Kati Garza PA-C    ANESTHESIA:  General endotracheal anesthesia.    ANESTHESIOLOGIST:  Feliciano Li MD    COMPLICATIONS:  None.    ESTIMATED BLOOD LOSS:  Less than 100 mL.    PREOPERATIVE NOTE:  This is a pleasant 72-year-old male who presents with low   back pain and bilateral extremity radicular leg pain, weakness and   paresthesia, worse in the left than right.  Patient is  having increasing   symptoms in the right side, which was consistent with his right L3-L4 synovial   cyst, which is certainly more prominent now.  Patient failed conservative   care including physical therapy given the patient's new onset symptoms, which   had resolved after prior surgery and his imaging, which shows a new left L4-L5   synovial cyst and a new right L3-L4 synovial cyst.  I offered the patient   redo exploration and decompression bilaterally at L3-L5 with resection of the   synovial cyst.  I discussed surgical procedure, alternatives, goals, risks,   benefits, complications in detail with the patient.  I used a bone model, MRI   and educational handouts to assist the patient with decision making.  I   answered all questions to the best of my ability.  I went over everything in   detail again with the patient in the preoperative area and discussed with his   new symptoms on the right side _____ at the both levels at L3-L4 and L4-L5 and   the synovial cyst bilaterally as a matter of medical necessity.  Patient   understood and consented to surgery.    NARRATIVE DICTATION:  Patient was brought to the operating room, placed under   general endotracheal anesthesia.  He was placed prone on the operating OSI   table with care taken about the bony prominences and peripheral nerves.    Lumbar region was prepped and draped in usual sterile fashion.  Following   localization with crosstable fluoroscopy, an incision was made over L3-L5.    The dorsal elements of L3-L5 were exposed in a subperiosteal fashion out to   the facets bilaterally.  Self-retaining retraction applied using the long   retractor blades.  I dissected through the dense scarring bilaterally.    Intraoperative x-ray confirmed appropriate levels.    Using [employer] AM-8 drillbit, Kerrison rongeurs and curettes, bilateral L3   laminotomies and foraminotomies were completed.  Bilateral L4 laminotomies,   bilateral L5 laminotomies and foraminotomies  completed.  Marked facet and   ligamentous hypertrophy was undercut and removed.  I dissected through the   dense scarring and carefully dissected the thecal sac of the scarring.  The   microscope was used for microdissection of spinal canal to assist with this.    I drilled down the left L4 pedicle and the left L5 pedicle.  I drilled down   the pedicles with the [employer] AM-8 drillbit.  This required extra degree of   expertise, effort and time, hence a modifier-59 is required as a separate and   distinct procedure.  I then isolated the left L4-L5 synovial cyst and resected   off the thecal sac and removed in its entirety, freeing up the thecal sac and   the exiting left L4-L5 nerve root complex.  I then moved up to the L3-L4   level on the right side and carefully dissected out the synovial cyst and   resected this and undercut the facet and bovied out the facet and drilled out   the facet to prevent recurrence of the synovial cyst as best as possible.    Wound was irrigated with bacitracin irrigation and immaculate hemostasis   achieved.  The wound was then closed over a drain, brought through a separate   incision with 0 Vicryl deep fascia, 2-0 Vicryl deep dermal layer, Steri-Strips   to skin.  Small sterile dressing was applied.  Swabs, needles, instruments   correct by 2 count.  No complications were encountered.  Patient tolerated the   procedure, was stable and transferred to recovery room.  Patient will be   observed at [facility] overnight until he meets discharge   criteria.    INTRAOPERATIVE FINDINGS:  Stenosis bilaterally at L3-L4 and L4-L5, which was   recurrent with superimposed synovial right-sided L3-L4 and left-sided L4-L5   coming off the facet joints themselves.  These were resected and bilateral   redo decompressed.  A modifier-22 was required given the patient's body mass   index of 36 with comorbidities of diabetes and hypertension, prior cardiac   stenting, which added an  extra hour to the standard surgical procedure and   hence the codes were consistent with morbid obesity.    Patient will follow up at [facility] in 2   weeks and 6 weeks' time as arranged.       ____________________________________     ELLEN MORA MD    JJL / NTS    DD:  02/20/2018 06:48:20  DT:  02/20/2018 07:47:44    D#:  [id number]  Job#:  [id number]    cc: WOLF KENNEDY MD

## 2019-06-24 ENCOUNTER — OFFICE VISIT (OUTPATIENT)
Dept: VASCULAR LAB | Facility: MEDICAL CENTER | Age: 74
End: 2019-06-24
Attending: INTERNAL MEDICINE
Payer: COMMERCIAL

## 2019-06-24 VITALS
DIASTOLIC BLOOD PRESSURE: 71 MMHG | HEART RATE: 81 BPM | WEIGHT: 224 LBS | HEIGHT: 66 IN | SYSTOLIC BLOOD PRESSURE: 142 MMHG | BODY MASS INDEX: 36 KG/M2

## 2019-06-24 DIAGNOSIS — E29.1 HYPOGONADISM MALE: Chronic | ICD-10-CM

## 2019-06-24 DIAGNOSIS — N52.9 ERECTILE DYSFUNCTION, UNSPECIFIED ERECTILE DYSFUNCTION TYPE: ICD-10-CM

## 2019-06-24 DIAGNOSIS — I1A.0 RESISTANT HYPERTENSION: ICD-10-CM

## 2019-06-24 DIAGNOSIS — E11.9 TYPE 2 DIABETES MELLITUS WITHOUT COMPLICATION, WITHOUT LONG-TERM CURRENT USE OF INSULIN (HCC): ICD-10-CM

## 2019-06-24 DIAGNOSIS — I25.10 CORONARY ARTERY DISEASE INVOLVING NATIVE HEART WITHOUT ANGINA PECTORIS, UNSPECIFIED VESSEL OR LESION TYPE: Chronic | ICD-10-CM

## 2019-06-24 DIAGNOSIS — E78.5 DYSLIPIDEMIA: ICD-10-CM

## 2019-06-24 DIAGNOSIS — G47.33 OBSTRUCTIVE SLEEP APNEA SYNDROME: Chronic | ICD-10-CM

## 2019-06-24 DIAGNOSIS — Z79.02 LONG TERM (CURRENT) USE OF ANTITHROMBOTICS/ANTIPLATELETS: ICD-10-CM

## 2019-06-24 PROCEDURE — 99212 OFFICE O/P EST SF 10 MIN: CPT | Performed by: FAMILY MEDICINE

## 2019-06-24 PROCEDURE — 99204 OFFICE O/P NEW MOD 45 MIN: CPT | Performed by: FAMILY MEDICINE

## 2019-06-24 RX ORDER — TRANDOLAPRIL TABLETS 4 MG/1
4 TABLET ORAL
Qty: 90 TAB | Refills: 3 | Status: SHIPPED | OUTPATIENT
Start: 2019-06-24 | End: 2020-06-18

## 2019-06-24 RX ORDER — SPIRONOLACTONE 25 MG/1
25 TABLET ORAL DAILY
Qty: 90 TAB | Refills: 3 | Status: SHIPPED | OUTPATIENT
Start: 2019-06-24 | End: 2019-08-01

## 2019-06-24 RX ORDER — CHLORTHALIDONE 25 MG/1
25 TABLET ORAL DAILY
Qty: 90 TAB | Refills: 3 | Status: SHIPPED | OUTPATIENT
Start: 2019-06-24 | End: 2019-08-01

## 2019-06-24 RX ORDER — AMLODIPINE BESYLATE 5 MG/1
5 TABLET ORAL 2 TIMES DAILY
COMMUNITY
Start: 2019-06-24 | End: 2020-03-05

## 2019-06-24 ASSESSMENT — ENCOUNTER SYMPTOMS
HEMOPTYSIS: 0
DIARRHEA: 0
HEADACHES: 0
SORE THROAT: 0
FOCAL WEAKNESS: 0
INSOMNIA: 0
BLOOD IN STOOL: 0
SHORTNESS OF BREATH: 0
BLURRED VISION: 0
CHILLS: 0
ORTHOPNEA: 0
DEPRESSION: 0
NAUSEA: 0
DOUBLE VISION: 0
FEVER: 0
COUGH: 0
BRUISES/BLEEDS EASILY: 0
TREMORS: 0
ABDOMINAL PAIN: 0
VOMITING: 0
MYALGIAS: 0
WHEEZING: 0
DIZZINESS: 0
WEAKNESS: 0
PALPITATIONS: 0
NERVOUS/ANXIOUS: 0
SEIZURES: 0

## 2019-06-24 NOTE — PROGRESS NOTES
[facility] - INITIAL VISIT   6/24/19    Assessment / Plan:   1. Blood Pressure Control:  ACC/AHA (2017) goal <130/80  Home BP at goal:  no  Office BP at goal:  No, notable white coat effect   Qualifies for resistant HTN due to 4 meds w/o control   Device candidate? No due to age   Plan:   Monitoring:   - continue home BP monitoring, reviewed correct technique:  Yes   - order 24h ABPM:  UNDECIDED, will consider if persistent white coat effect  - monitor lytes/gfr routinely   - advised that stabilizing BP may require multiple med changes and close monitoring over the next several months, reviewed that initially there will be additional imaging and labs and the possibility of adverse drug rxn's and variability of his BP and HR until we have things stabilized.  Advised to contact our office as needed for questions or concerns.    2. Work up of Secondary Causes of Resistant Hypertension:   Renovascular HTN: Not Yet Assessed  Primary Aldosteronism: Not Yet Assessed  Thyroid Function: Not Yet Assessed  Obstructive Sleep Apnea: using BiPAP, reports good compliance, pending with pulm MD in 2-3 weeks, minimal good nights sleep   Pheochromocytoma: Not Yet Assessed  Other: none  Recommendations At This Visit: check CANDY duplex, labs         3. Medication Use / Adherence:  Assessment: Complete  Recommendations: Instructed to Continue Taking All Medications As Prescribed         4. End Organ Damage:   Left Ventricular Hypertrophy: Absent on  Echocardiogram Date: 2017  Albuminuria: Microalbuminuria on Date: 10/2018  Renal Function: Chronic Kidney Disease  Stage 3a  Established Cardiovascular Disease: Present: hx of CAD, s/p CABG    5. Lifestyle Recommendations:  Smoking: continued complete avoidance of all tobacco products     Physical Activity: continue healthy activity to improve CV fitness, see care instructions for additional details     Weight Management and Nutrition: Dietary plan was discussed with patient at this  visit including DASH, low sodium and/or as outlined in care instructions     6. Standard HTN Pharmacotherapy:  ACEI/ARB: change quinapril to trandolapril 4mg QHS for true 24h coverage   Thiazide Type Diuretic: stop hctz, switch to chlorthalidone 25mg daily, monitor lytes/gfr  Calcium Channel Blocker (CCB): continue amlodipine 5mg BID, venodilatory edema should be balanced by thiazide and MRA use     7. Additional Agents:  Aldosterone Antagonist: add spironolactone 25mg daily, titrate to 50mg if BP not stable, monitor lytes   Loop Diuretic: none   Peripheral Alpha Blocker: add doxazosin 1-2mg QHS as next agent   Other CCB: none  Direct Vasodilator: none  Centrally Acting Alpha Agonist: prefer to avoid clonidine due to rebound HTN effects, will consider prazosin if BP spikes start occurring   Other:   BB: could consider carvedilol or nebivolol as future agents      8. Other CV Risk Factors:     Lipids:  NLA Risk Category:   Very high:  Evidence of ASCVD or T1/T2D with 2 or more RFs or evidence of end-org damage (CKD, ACR>29, retinopathy)  Tx threshold:  non-HDL-C >99, LDL-C >69  Tx goal: non-HDL-C <100,  LDL-C <70  (optional: apoB <80)  At goal:  yes  Plan:  - reinforced ongoing TLC measures   - continue rosuva 5mg daily  - update labs     DM: type 2, non-insulin  Last A1c = 6.3%  Goal A1c <7.0%  ACR: abnormal: A2  Plan:  - recommmend for routine care with PCP (or endocrine) to include regular A1c monitoring, annual albumin/creatinine ratio (ACR), annual diabetic retinopathy screening, foot exams, annual flu vaccine, and updates to pneumonia vaccines as appropriate     Smoking: ended year 1987 or pack years of use 25     Antiplatelet Therapy: continue ASA 81mg daily      9. Other Issues:    - Afib - hx of paroxysmal Afib post-op CABG.  No current, has been off BB and xarelto in 2013. Continue monitoring per cardiology     - hypogonadism, erectile dysfunction - monitor while using chlorthalidone and spirono for  ADRs    Studies Ordered At This Visit: CANDY duplex, echo - ordered, APRN to track   Blood Work to Be Obtained Prior to Next Visit: tsh, cmp, lipid, acr, ellen, PRA, plasma metaneph in about 3 weeks   Disposition: RTC in 4 weeks    Diagnosis:  1. Resistant hypertension  EC-ECHOCARDIOGRAM COMPLETE W/O CONT    US-RENAL ARTERY DUPLEX COMP    TSH    Comp Metabolic Panel    RENIN PLASMA    ALDOSTERONE    METANEPHRINES PLASMA    MICROALBUMIN CREAT RATIO URINE   2. Coronary artery disease involving native heart without angina pectoris, unspecified vessel or lesion type  Lipid Profile   3. Hypogonadism male     4. Obstructive sleep apnea syndrome     5. Type 2 diabetes mellitus without complication, without long-term current use of insulin (HCC)     6. Erectile dysfunction, unspecified erectile dysfunction type     7. Long term (current) use of antithrombotics/antiplatelets     8. Essential hypertension  amLODIPine (NORVASC) 5 MG Tab   9. Dyslipidemia  Lipid Profile        History of Present Illness:   Wang Forman Jr. is a male seen for evaluation of resistant HTN and management.     Wang Forman Jr. is referred by:cardiologist Scott Cameron,*.    HTN:  Current HTN concerns: Denies   Current ADRs: No but notes very sensitive to side effects in the past.   HTN sx:  No current blurred or changed vision, chest pain, shortness of breath, headache, nausea, dizziness/vertigo   Home BP lo-160s/80-90s, occ 170s/90s  24h ABPM completed: not tested  Adherence to current HTN meds: compliant all of the time     Antiplatelet/anticoagulation:   Yes, Details: ASA 81mg, no bleeding issues     Hyperlipidemia:    Stable, no current concerns  Current treatment:rosuva 5mg  Daily   Myalgias? No  Other adverse drug reactions? No  Lipid profile:     Lab Results  Component Value Date/Time   CHOLSTRLTOT 137 10/19/2018 0730   TRIGLYCERIDE 160 (H) 10/19/2018 07   HDL 35.0 (L) 10/19/2018 07   LDL 70 10/19/2018 07  "  CHOLHDLRAT 3.91 10/19/2018 0730   NONHDL 102 10/19/2018 0730     LDL (mg/dL)   Date Value   10/19/2018 70   02/03/2016 83     HDL (mg/dL)   Date Value   10/19/2018 35.0 (L)   02/03/2016 28.0 (L)     T2D:   Last A1c 6.3%, currently on metformin.  Highest 6.9%    CHOCO:   feels poorly rested daily, currently on Bipap, pending eval with pulm in2-3 weeks     Pertinent HTN hx (reviewed at initial visit):   Age at HTN dx: >10yrs  Past HTN medications: unsure of prior   HTN crises:  No prior hospitalization or crises   Lifestyle factors:     High salt: Yes, Details: knows to keep Na low    EtOH: No  Interfering substances:     NSAIDs: No    Decongestants. No   Stimulants/drugs: No    Clinical evidence of ASCVD:     1) hx of MI/ACS:  Yes, Details: s/p CABG 2013 (Dr. Love)   2) coronary or other revascularization procedure: PCI with stenting   3) TIA/ischemic CVA: No   4) PAD (including RAKEL <0.9): No   5) documented (CAD, Renal athero, AA due to athero, carotid plaque >50% stenosis: No    Major RFs:     1) Age (Men>44, women>54):  yes   2) Fhx early CAD (<55 men, <65 women):  no   3) cigarette smoking:  Yes, Details: former, quit 1987   4) high BP >139/89 or on tx: yes   5) Low HDL-C (<40 men, <50 women):  no    Other ASCVD risk factors:     CKD:  Yes, Details: G3a/A2 at worst, denies excessive nsaids    Hypothyroidism: No    Past Medical History:   Diagnosis Date   • Adult acne    • Allergic rhinitis    • Anesthesia     \"hard time waking up\" and itching    • Arthritis of knee    • Atrial fibrillation (HCC) 2013    post-op CABG; no episodes since   • Breath shortness     with exertion   • Cataract     bilat IOL   • Colon polyps    • Coronary artery disease     cabg/[facility]   • Degenerative disk disease     lumbar and cervical   • DISH (diffuse idiopathic skeletal hyperostosis)     back   • Fibromyalgia    • H/O bone density study 03/30/12    [facility],    • Heart burn    • History of kidney stones    • Hyperlipidemia     " with elevated triglycerides and low HDL   • Hypertension    • Hypogonadism, male    • Myalgia    • Myocardial infarct ([facility])     5 total   • Osteoarthritis    • Restless leg syndrome    • Sleep apnea     on bipap Garcia   • Type 2 diabetes mellitus (HCC)     oral meds (pt becomes symptomatic when FSBS is in the 70s)    • Vertigo       Problem List:     Patient Active Problem List    Diagnosis Date Noted   • Erectile dysfunction, unspecified erectile dysfunction type 06/24/2019   • Low back pain 02/13/2018   • Degeneration of lumbar intervertebral disc 10/24/2017   • DDD (degenerative disc disease), lumbar 09/26/2017   • Colon polyps    • Atrial fib/flutter, transient 05/07/2014   • Diet-controlled type 2 diabetes mellitus (CMS-[facility]) 05/07/2014   • Type 2 diabetes mellitus without complication, without long-term current use of insulin ([facility]) 09/07/2012   • Essential hypertension 09/07/2012   • ASTHMA 09/07/2012   • Sleep apnea 09/07/2012   • Environmental allergies 09/07/2012   • Dyslipidemia 09/07/2012   • Restless leg syndrome 09/07/2012   • CAD (coronary artery disease) 09/07/2012   • Osteoarthritis 09/07/2012   • DISH (diffuse idiopathic skeletal hyperostosis) 09/07/2012   • Hypogonadism male 09/07/2012   • Lumbar disc disease 09/07/2012   • Cervical disc disease 09/07/2012   • Insomnia 09/07/2012        Surgical History:     Past Surgical History:   Procedure Laterality Date   • LUMBAR LAMINECTOMY DISKECTOMY  2/13/2018    Procedure: LUMBAR LAMINECTOMY DISKECTOMY- FOR REDO L3-5 LAMINOTOMIES W/SYNOVIAL CYST RESECTION;  Surgeon: Lenny Lew M.D.;  Location: SURGERY [facility];  Service: Neurosurgery   • LUMBAR LAMINECTOMY DISKECTOMY  10/24/2017    Procedure: LUMBAR LAMINECTOMY DISKECTOMY L2-5;  Surgeon: Lenny Lew M.D.;  Location: SURGERY [facility];  Service: Neurosurgery   • OTHER NEUROLOGICAL SURG  03/2017    hardware in neck   • MULTIPLE CORONARY ARTERY BYPASS  12/5/2013    [facility] Dr. Love   •  CARPAL TUNNEL ENDOSCOPIC  2011    RT   • KNEE ARTHROSCOPY  2009    RT   • ANGIOPLASTY  2009    ADDITIONAL DRUG-ELUTING STENT IN CIRCUMFLEX   • ANGIOPLASTY  2004    MULTIVESSEL ANGIOPLASTY AND DRUG-ELUTING STENTS   • CATARACT EXTRACTION WITH IOL     • LUMBAR LAMINECTOMY DISKECTOMY     • UVULOPHARYNGOPALATOPLASTY          Social History:     Social History     Social History   • Marital status:      Spouse name: N/A   • Number of children: N/A   • Years of education: N/A     Social History Main Topics   • Smoking status: Former Smoker     Packs/day: 2.00     Years: 25.00     Types: Cigarettes     Quit date: 1/1/1987   • Smokeless tobacco: Never Used   • Alcohol use 0.0 oz/week      Comment: a few times a year   • Drug use: No   • Sexual activity: Not on file      Comment:      Other Topics Concern   • Not on file     Social History Narrative   • No narrative on file        Family History:     Family History   Problem Relation Age of Onset   • Heart Attack Father 62        SILENT   • Heart Disease Father    • Cancer Sister         colon   • Hypertension Brother         Review of Systems:   Review of Systems   Constitutional: Negative for chills, fever and malaise/fatigue.   HENT: Negative for nosebleeds, sore throat and tinnitus.    Eyes: Negative for blurred vision and double vision.   Respiratory: Negative for cough, hemoptysis, shortness of breath and wheezing.    Cardiovascular: Negative for chest pain, palpitations, orthopnea and leg swelling.   Gastrointestinal: Negative for abdominal pain, blood in stool, diarrhea, melena, nausea and vomiting.   Genitourinary: Negative for hematuria.   Musculoskeletal: Negative for joint pain and myalgias.   Skin: Negative for itching and rash.   Neurological: Negative for dizziness, tremors, focal weakness, seizures, weakness and headaches.   Endo/Heme/Allergies: Does not bruise/bleed easily.   Psychiatric/Behavioral: Negative for depression. The patient is not  "nervous/anxious and does not have insomnia.         Objective:   Allergies:  Naproxen; Nsaids; Codeine; Morphine; Statins [hmg-coa-r inhibitors]; and Sulfa drugs    Vitals:    06/24/19 1437 06/24/19 1443   BP: (!) 161/71 142/71   BP Location: Left arm Left arm   Patient Position: Sitting Sitting   BP Cuff Size: Adult Adult   Pulse: 79 81   Weight: 101.6 kg (224 lb) 101.6 kg (224 lb)   Height: 1.676 m (5' 6\") 1.676 m (5' 6\")     BP Readings from Last 5 Encounters:   06/24/19 142/71   02/12/19 158/92   01/15/19 120/80   11/21/18 142/78   11/05/18 (!) 162/84      Body mass index is 36.15 kg/m².    Outpatient Encounter Prescriptions as of 6/24/2019   Medication Sig Dispense Refill   • amLODIPine (NORVASC) 5 MG Tab Take 1 Tab by mouth 2 Times a Day.     • trandolapril (MAVIK) 4 MG Tab Take 1 Tab by mouth every bedtime for 360 days. 90 Tab 3   • spironolactone (ALDACTONE) 25 MG Tab Take 1 Tab by mouth every day for 360 days. 90 Tab 3   • chlorthalidone (HYGROTON) 25 MG Tab Take 1 Tab by mouth every day for 360 days. 90 Tab 3   • acarbose (PRECOSE) 50 MG Tab TAKE ONE TABLET BY MOUTH TWICE DAILY 180 Tab 3   • metFORMIN (GLUCOPHAGE) 500 MG Tab Take 1 Tab by mouth every day. 90 Tab 3   • pantoprazole (PROTONIX) 40 MG Tablet Delayed Response TAKE ONE TABLET BY MOUTH ONCE DAILY 90 Tab 3   • aspirin 81 MG EC tablet Take  by mouth.     • cyclobenzaprine (FLEXERIL) 10 MG Tab Take 1 Tab by mouth every 8 hours as needed for Muscle Spasms. 60 Tab 0   • doxycycline (VIBRAMYCIN) 100 MG Tab Take 1 Tab by mouth 2 times a day. 10 Tab 0   • therapeutic multivitamin-minerals (THERAGRAN-M) Tab Take 1 Tab by mouth every day.     • rosuvastatin (CRESTOR) 5 MG Tab Take 5 mg by mouth every evening.     • [DISCONTINUED] hydrochlorothiazide (MICROZIDE) 12.5 MG capsule Take 1 Cap by mouth every day. 90 Cap 3   • [DISCONTINUED] amLODIPine (NORVASC) 5 MG Tab Take 5 mg by mouth every day.     • testosterone cypionate (DEPO-TESTOSTERONE) 200 MG/ML " Solution injection 1 mL by Intramuscular route every 21 days. 1 mL 11   • [DISCONTINUED] tadalafil (CIALIS) 5 MG tablet Take 1 Tab by mouth as needed for Erectile Dysfunction. (Patient not taking: Reported on 6/24/2019) 30 Tab 3   • [DISCONTINUED] quinapril (ACCUPRIL) 40 MG tablet Take 1 Tab by mouth every day. 90 Tab 3     No facility-administered encounter medications on file as of 6/24/2019.      Physical Exam   Constitutional: He is oriented to person, place, and time. He appears well-developed and well-nourished. He is cooperative. No distress.   HENT:   Head: Normocephalic and atraumatic.   Mouth/Throat: Oropharynx is clear and moist and mucous membranes are normal.   Eyes: Pupils are equal, round, and reactive to light. Conjunctivae are normal.   Neck: Trachea normal and normal range of motion. Neck supple. Normal carotid pulses and no JVD present. Carotid bruit is not present. No thyromegaly present.   Cardiovascular: Normal rate, regular rhythm, normal heart sounds and intact distal pulses.  Exam reveals no gallop and no friction rub.    No murmur heard.  Pulses:       Carotid pulses are 2+ on the right side, and 2+ on the left side.       Radial pulses are 2+ on the right side, and 2+ on the left side.        Dorsalis pedis pulses are 2+ on the right side, and 2+ on the left side.        Posterior tibial pulses are 2+ on the right side, and 2+ on the left side.   Edema:    RLE: none     LLE: none   Spider telangectasia:       RLE:  None      LLE: none   Varicosities:         RLE: none      LLE: none   Corona phlebectatica:      RLE:  None        LLE:  None   Cording:         RLE:  None     LLE: None    Pulmonary/Chest: Effort normal and breath sounds normal. No respiratory distress.   Abdominal: Soft. Bowel sounds are normal. He exhibits no distension and no mass. There is no hepatosplenomegaly. There is no tenderness. There is no rebound and no guarding.   Musculoskeletal: He exhibits no edema.    Lymphadenopathy:     He has no cervical adenopathy.   Neurological: He is alert and oriented to person, place, and time. No cranial nerve deficit. Coordination and gait normal.   Skin: Skin is warm and dry. He is not diaphoretic. No cyanosis. No pallor. Nails show no clubbing.   Psychiatric: He has a normal mood and affect.        Accessory Clinical Findings:   Echocardiogram:  Results for orders placed or performed during the hospital encounter of 17   ECHOCARDIOGRAM COMP W/O CONT   Result Value Ref Range    Eject.Frac. MOD BP 57.79     Eject.Frac. MOD 4C 56.25     Eject.Frac. MOD 2C 62.39       Lab Results   Component Value Date    CHOLSTRLTOT 137 10/19/2018    LDL 70 10/19/2018    HDL 35.0 (L) 10/19/2018    TRIGLYCERIDE 160 (H) 10/19/2018         Lab Results   Component Value Date    HBA1C 6.4 01/15/2019      Lab Results   Component Value Date    SODIUM 142 2019    POTASSIUM 4.2 2019    CHLORIDE 105 2019    CO2 25 2019    GLUCOSE 152 (H) 2019    BUN 11 2019    CREATININE 1.3 2019        Lab Results   Component Value Date    URCREAT 137.68 10/19/2018    MICROALBUR 59.6 (H) 10/19/2018    MALBCRT 43 (H) 10/19/2018     Lab Results   Component Value Date    STMTRPT  2017     [facility]    Test Date:  2017  Pt Name:    MADELEINE MACHADO                 Department: [facility]  MRN:        [id number]                      Room:       [facility]  Gender:     M                            Technician: [facility]  :        [id number]                   Requested By:ISMAEL BENAVIDEZ  Order #:    [id number]                    Reading MD: Carina Ross MD    Measurements  Intervals                                Axis  Rate:       79                           P:          58  UT:         176                          QRS:        -39  QRSD:       94                           T:          59  QT:         396  QTc:        455    Interpretive Statements  SINUS RHYTHM  LEFT AXIS  DEVIATION  LATE PRECORDIAL R/S TRANSITION  Compared to ECG 09/26/2017 10:25:19  Left-axis deviation now present  Left bundle-branch block no longer present    Electronically Signed On 9- 14:26:26 PDT by Carina Ross MD       VASCULAR IMAGING:     Echo 2017  No prior study is available for comparison.   Left ventricular ejection fraction is visually estimated to be 60%.   Normal diastolic function. Poor endocardial definition difficult to   assess wall motion.  No evidence of valvular abnormality based on Doppler evaluation.   Estimated right ventricular systolic pressure  is 25 mmHg.            Javad Childs M.D.

## 2019-06-24 NOTE — PATIENT INSTRUCTIONS
1) stop quinapril - start trandolapril at bedtime   2) stop HCTZ, - start chlorthalidone and spironolactone in the morning   3) continue all other medications   4) check labs in 3 weeks and kidney ultrasound and echocardiogram     Physical activity:  - engage in moderate to vigorous physical activity such as brisk walking, swimming, cycling, >150 minutes per week at 30-40 minutes per session, 3 to 5 times weekly or as directed at today's visit     General healthly nutrition advice:  - the USDA food pattern (https://www.cnpp.usda.gov/USDAFoodPatterns)  - plate method (https://www.[employer]nov[employer]smyplate.gov/)  - consume diet that emphasizes intake of vegetables, fruits, and whole grains,  - use low fat diary products, poultry, fish, legumes, nontropical vegetable oils, nuts  - limit intake of sweets, sugar-sweetned beverages, and red meats   - reduce saturated and trans fats to <6% of your daily calories   - consume no more than 2,400mg of sodium daily (look at food labels) or if you have high BP then reduce to no more than 1,500mg of sodium daily     BP lowering diet:   - DASH diet (https://www.heart.org/en/health-topics/high-blood-pressure/changes-you-can-make-to-manage-high-blood-pressure/managing-blood-pressure-with-a-heart-healthy-diet)    Cholesterol-reducing diets:   - AHA diet  (http://www.heart.org/en/healthy-living/healthy-eating/eat-smart/nutrition-basics/aha-diet-and-lifestyle-recommendations)   - Mediterranean diet (http://www.heart.org/en/healthy-living/healthy-eating/eat-smart/nutrition-basics/mediterranean-diet)   - lowering triglycerides: (http://my.americanheart.org/idc/groups/ahamah-public/@wc/@sop/@[facility]/documents/downloadable/[facility]_425988.pdf)

## 2019-07-01 ENCOUNTER — TELEPHONE (OUTPATIENT)
Dept: VASCULAR LAB | Facility: MEDICAL CENTER | Age: 74
End: 2019-07-01

## 2019-07-01 DIAGNOSIS — I10 ESSENTIAL HYPERTENSION: ICD-10-CM

## 2019-07-01 NOTE — TELEPHONE ENCOUNTER
Dr. Childs pt called in.   6/24/19 pt saw Dr. Childs.   Pt's Amlodipine dose was increased from 5mg daily to 5mg BID, has been on this for years.  Pt's ACEI was changed from Quinapril 40mg QD (had been on for years) to Trandolapril 4mg QD.  Pt was started on Chlorthalidone 25mg daily and Spironolactone 25mg daily - which he takes in the morning.     Pt reports having persistent hiccups for the past 2 days. States that this has happened in the past, and in fact was looking for his old RX of chlorpromazine. Which he was unable to locate, states that when this happen years ago it was a different set of circumstances. Advised he would need to be seen my MD to be accessed to be able to obtain more. Although per UpToDate 1st line is prophylactic PPI (pt is on pantoprazole), and then bacolfen, gabapentin, and/or metocloramide.     Pt also reports severe muscle pain in his legs, he thought it was statin induced however he self decreased his statin dose to QOD without relief of symptoms.     At this point it's unclear if this is medication induced or electrolyte abnormality? Pt already took both Spirolactone and Chlorothalidone today. Pt will get labs at [facility], and call tomorrow morning to discuss results prior to taking either medication. Of note patient reports that his BP is still not controlled.     Ashley Bustamante, PharmD

## 2019-07-02 ENCOUNTER — ANTICOAGULATION MONITORING (OUTPATIENT)
Dept: VASCULAR LAB | Facility: MEDICAL CENTER | Age: 74
End: 2019-07-02

## 2019-07-02 DIAGNOSIS — R06.6 INTRACTABLE HICCUPS: ICD-10-CM

## 2019-07-02 DIAGNOSIS — E11.9 DIET-CONTROLLED DIABETES MELLITUS (HCC): ICD-10-CM

## 2019-07-02 RX ORDER — CHLORPROMAZINE HYDROCHLORIDE 25 MG/1
25 TABLET, FILM COATED ORAL 3 TIMES DAILY
Qty: 6 TAB | Refills: 0 | Status: SHIPPED | OUTPATIENT
Start: 2019-07-02 | End: 2019-08-01

## 2019-07-02 NOTE — PROGRESS NOTES
[facility]      Pt called to review his laboratory findings and determine if his medication are causing his symptoms:    Hiccups:  Hyponatremia has been associated with intractable hiccups.     Pt self discontinued these meds on 7/1/19:  Trandolapril  Spironolactone  Chlorthalidone    Discussed the case with Xochitl RANDOLPH and decided pt should restard trandolapril and spironolactone.  Pt would like to only start spironolactone at 12.5 mg daily.   Pt to continue to hold chlorthalidone.      Most recent /88, pt to continue to monitor and report any readings >180/100 or s/s of end organ damage.    Pt reports hiccups have occurred in the past and were stopped with 1-2 doses of chlorpromazine.  Pt will try chlorpromazine 25 mg TID x 2 days.  Pt told to stop chlorpromazine if hiccups resolve after just 1-2 doses.     Myalgia:  Pt has had increased pain and CPK shows moderate elevation.  Pt will reduce rosuvastatin to QOD dosing and alert the clinic if pain does not resolve.    Offered pt to be seen by Dr Childs at a sooner date, but pt refuses.    Labs prior to next visit  CPK  CMP    Javad Chang, PharmD     CC  Dr Childs

## 2019-08-01 ENCOUNTER — OFFICE VISIT (OUTPATIENT)
Dept: VASCULAR LAB | Facility: MEDICAL CENTER | Age: 74
End: 2019-08-01
Attending: INTERNAL MEDICINE
Payer: COMMERCIAL

## 2019-08-01 VITALS
WEIGHT: 227.1 LBS | BODY MASS INDEX: 36.5 KG/M2 | HEIGHT: 66 IN | SYSTOLIC BLOOD PRESSURE: 130 MMHG | HEART RATE: 67 BPM | DIASTOLIC BLOOD PRESSURE: 63 MMHG

## 2019-08-01 DIAGNOSIS — E78.1 HYPERTRIGLYCERIDEMIA: ICD-10-CM

## 2019-08-01 DIAGNOSIS — Z79.02 LONG TERM (CURRENT) USE OF ANTITHROMBOTICS/ANTIPLATELETS: ICD-10-CM

## 2019-08-01 DIAGNOSIS — E78.5 DYSLIPIDEMIA: ICD-10-CM

## 2019-08-01 DIAGNOSIS — I25.10 CORONARY ARTERY DISEASE INVOLVING NATIVE HEART WITHOUT ANGINA PECTORIS, UNSPECIFIED VESSEL OR LESION TYPE: ICD-10-CM

## 2019-08-01 DIAGNOSIS — I1A.0 RESISTANT HYPERTENSION: ICD-10-CM

## 2019-08-01 DIAGNOSIS — G47.33 OBSTRUCTIVE SLEEP APNEA SYNDROME: ICD-10-CM

## 2019-08-01 DIAGNOSIS — E11.9 TYPE 2 DIABETES MELLITUS WITHOUT COMPLICATION, WITHOUT LONG-TERM CURRENT USE OF INSULIN (HCC): ICD-10-CM

## 2019-08-01 PROCEDURE — 99214 OFFICE O/P EST MOD 30 MIN: CPT | Performed by: FAMILY MEDICINE

## 2019-08-01 PROCEDURE — 99212 OFFICE O/P EST SF 10 MIN: CPT | Performed by: FAMILY MEDICINE

## 2019-08-01 RX ORDER — CHLORTHALIDONE 25 MG/1
12.5 TABLET ORAL DAILY
Qty: 45 TAB | Refills: 3 | Status: SHIPPED | OUTPATIENT
Start: 2019-08-01 | End: 2019-08-23

## 2019-08-01 RX ORDER — SPIRONOLACTONE 25 MG/1
12.5 TABLET ORAL DAILY
Qty: 45 TAB | Refills: 3 | Status: SHIPPED | OUTPATIENT
Start: 2019-08-01 | End: 2019-08-23

## 2019-08-01 RX ORDER — PRAVASTATIN SODIUM 20 MG
20 TABLET ORAL DAILY
Qty: 30 TAB | Refills: 11 | Status: SHIPPED | OUTPATIENT
Start: 2019-08-01 | End: 2019-09-20

## 2019-08-01 ASSESSMENT — ENCOUNTER SYMPTOMS
TREMORS: 0
HEMOPTYSIS: 0
NAUSEA: 0
FOCAL WEAKNESS: 0
NERVOUS/ANXIOUS: 0
HEADACHES: 0
MYALGIAS: 0
ORTHOPNEA: 0
BLURRED VISION: 0
CHILLS: 0
INSOMNIA: 0
ABDOMINAL PAIN: 0
SORE THROAT: 0
WEAKNESS: 0
DOUBLE VISION: 0
COUGH: 0
BLOOD IN STOOL: 0
SEIZURES: 0
DIZZINESS: 0
FEVER: 0
VOMITING: 0
DEPRESSION: 0
BRUISES/BLEEDS EASILY: 0
SHORTNESS OF BREATH: 0
DIARRHEA: 0
PALPITATIONS: 0
WHEEZING: 0

## 2019-08-01 NOTE — PATIENT INSTRUCTIONS
1) restart chlorthalidone only 1/2 tablet in the morning, call if you have hiccups   2) continue spironolactone 1/2 tablet in the morning   3) stop rosuvastatin, start pravastatin 20mg, mon/tues/wed/fri  Only and monitor for tolerance and muscle aches   4) vitamin D3 4000 units daily     Physical activity:  - engage in moderate to vigorous physical activity such as brisk walking, swimming, cycling, >150 minutes per week at 30-40 minutes per session, 3 to 5 times weekly or as directed at today's visit     General healthly nutrition advice:  - the USDA food pattern (https://www.cnpp.usda.gov/USDAFoodPatterns)  - plate method (https://www.choosemyplate.gov/)  - consume diet that emphasizes intake of vegetables, fruits, and whole grains,  - use low fat diary products, poultry, fish, legumes, nontropical vegetable oils, nuts  - limit intake of sweets, sugar-sweetned beverages, and red meats   - reduce saturated and trans fats to <6% of your daily calories   - consume no more than 2,400mg of sodium daily (look at food labels) or if you have high BP then reduce to no more than 1,500mg of sodium daily     BP lowering diet:   - DASH diet (https://www.heart.org/en/health-topics/high-blood-pressure/changes-you-can-make-to-manage-high-blood-pressure/managing-blood-pressure-with-a-heart-healthy-diet)    Cholesterol-reducing diets:   - AHA diet  (http://www.heart.org/en/healthy-living/healthy-eating/eat-smart/nutrition-basics/aha-diet-and-lifestyle-recommendations)   - Mediterranean diet (http://www.heart.org/en/healthy-living/healthy-eating/eat-smart/nutrition-basics/mediterranean-diet)   - lowering triglycerides: (http://my.americanheart.org/idc/groups/ahamah-public/@[facility]/@sop/@[facility]/documents/downloadable/[facility]_[email or web address].pdf)

## 2019-08-01 NOTE — PROGRESS NOTES
RESISTANT HTN CLINIC - INITIAL VISIT   8/1/19    Assessment / Plan:   1. Blood Pressure Control:  Qualifies for resistant HTN due to 4 meds w/o control   ACC/AHA (2017) goal <130/80  Home BP at goal:  No, possibly masked HTN   Office BP at goal:  yes, notable white coat effect   Device candidate? No due to age   Plan:   Monitoring:   - continue home BP monitoring, reviewed correct technique:  Yes   - order 24h ABPM:  UNDECIDED, will consider if persistent white coat effect  - monitor lytes/gfr routinely   - advised that stabilizing BP may require multiple med changes and close monitoring over the next several months, reviewed that initially there will be additional imaging and labs and the possibility of adverse drug rxn's and variability of his BP and HR until we have things stabilized.  Advised to contact our office as needed for questions or concerns.    2. Work up of Secondary Causes of Resistant Hypertension:   Renovascular HTN: Excluded  Primary Aldosteronism: Not Yet Assessed  Thyroid Function: Excluded  Obstructive Sleep Apnea: using BiPAP, reports good compliance, pending with pulm MD in 2-3 weeks, minimal good nights sleep   Pheochromocytoma: Not Yet Assessed  Other: none  Recommendations At This Visit:        3. Medication Use / Adherence:  Assessment: Complete  Recommendations: Instructed to Continue Taking All Medications As Prescribed         4. End Organ Damage:   Left Ventricular Hypertrophy: Absent on  Echocardiogram Date: 2017  Albuminuria: Microalbuminuria, level A2 on Date: 10/2018  Renal Function: Chronic Kidney Disease  Stage 3a  Established Cardiovascular Disease: Present: hx of CAD, s/p CABG    5. Lifestyle Recommendations:  Smoking: continued complete avoidance of all tobacco products     Physical Activity: continue healthy activity to improve CV fitness, see care instructions for additional details     Weight Management and Nutrition: Dietary plan was discussed with patient at this visit  including DASH, low sodium and/or as outlined in care instructions     6. Standard HTN Pharmacotherapy:  ACEI/ARB: continue trandolapril 4mg QHS for true 24h coverage   Thiazide Type Diuretic: stop hctz, switch to chlorthalidone 25mg 1/2 tab QAM, call if any hiccups develop, monitor lytes/gfr  Calcium Channel Blocker (CCB): continue amlodipine 5mg BID, venodilatory edema should be balanced by thiazide and MRA use     7. Additional Agents:  Aldosterone Antagonist: continue spironolactone 25mg daily 1/2 tab QAM, titrate to 25-50mg if BP not stable, monitor lytes   Loop Diuretic: none   Peripheral Alpha Blocker: add doxazosin 1-2mg QHS as next agent   Other CCB: none  Direct Vasodilator: none  Centrally Acting Alpha Agonist: prefer to avoid clonidine due to rebound HTN effects, will consider prazosin if BP spikes start occurring   Other:   BB: could consider carvedilol or nebivolol as future agents      8. Other CV Risk Factors:     Lipids:  Myalgias on rosuva.   NLA Risk Category:   Very high:  Evidence of ASCVD or T1/T2D with 2 or more RFs or evidence of end-org damage (CKD, ACR>29, retinopathy)  Tx threshold:  non-HDL-C >99, LDL-C >69  Tx goal: non-HDL-C <100,  LDL-C <70  (optional: apoB <80)  At goal:  yes  Plan:  - reinforced ongoing TLC measures   - stop rosuvastatin, start pravastatin 20mg 4 days/week and monitor for tolerance  - start vitamin D3 9532-4850 daily     DM: type 2, non-insulin  Last A1c = 6.4%  Goal A1c <7.0%  ACR: abnormal: A2  Plan:  - recommmend for routine care with PCP (or endocrine) to include regular A1c monitoring, annual albumin/creatinine ratio (ACR), annual diabetic retinopathy screening, foot exams, annual flu vaccine, and updates to pneumonia vaccines as appropriate     Smoking: ended year 1987 or pack years of use 25     Antiplatelet Therapy: continue ASA 81mg daily      9. Other Issues:    - Afib - hx of paroxysmal Afib post-op CABG.  No current, has been off BB and xarelto in 2013.    - Continue monitoring per cardiology     - hypogonadism, erectile dysfunction   - monitor while using chlorthalidone and spirono for ADRs    Studies Ordered At This Visit:   Blood Work to Be Obtained Prior to Next Visit:   Disposition: RTC in 4 weeks    Diagnosis:  1. Resistant hypertension     2. Coronary artery disease involving native heart without angina pectoris, unspecified vessel or lesion type     3. Dyslipidemia     4. Long term (current) use of antithrombotics/antiplatelets     5. Type 2 diabetes mellitus without complication, without long-term current use of insulin (HCC)     6. Obstructive sleep apnea syndrome     7. Hypertriglyceridemia          History of Present Illness:   Wang Forman Jr. is a male seen for evaluation of resistant HTN and management.     Wang Forman Jr. is referred by:cardiologist Scott Cameron,*.    HTN:  Current HTN concerns:  Had hiccups on chlorthalidone prior and needed thorazine to stop.   Current ADRs: No but notes very sensitive to side effects in the past.   HTN sx:  No current blurred or changed vision, chest pain, shortness of breath, headache, nausea, dizziness/vertigo   Home BP lo-160s/80-90s, occ 170s/90s  24h ABPM completed: not tested  Adherence to current HTN meds: compliant all of the time     Antiplatelet/anticoagulation:   Yes, Details: ASA 81mg, no bleeding issues     Hyperlipidemia:    Stable, no current concerns  Current treatment:rosuva 5mg  Daily   Myalgias? yes  Other adverse drug reactions? No  Lipid profile:   Lab Results   Component Value Date/Time    CHOLSTRLTOT 148 2019 01:20 PM    LDL 42 2019 01:20 PM    HDL 27.0 (L) 2019 01:20 PM    TRIGLYCERIDE 395 (H) 2019 01:20 PM       T2D:   No sx. No lows. Last A1c 6.4%, currently on metformin.  Highest 6.9%    CHOCO:   feels poorly rested daily, currently on Bipap, seening pulm MD    Pertinent HTN hx (reviewed at initial visit):   Age at HTN dx:  >10yrs  Past HTN medications: unsure of prior   HTN crises:  No prior hospitalization or crises   Lifestyle factors:     High salt: Yes, Details: knows to keep Na low    EtOH: No  Interfering substances:     NSAIDs: No    Decongestants. No   Stimulants/drugs: No    Clinical evidence of ASCVD:     1) hx of MI/ACS:  Yes, Details: s/p CABG  (Dr. Love)   2) coronary or other revascularization procedure: PCI with stenting   3) TIA/ischemic CVA: No   4) PAD (including RAKEL <0.9): No   5) documented (CAD, Renal athero, AA due to athero, carotid plaque >50% stenosis: No    Major RFs:     1) Age (Men>44, women>54):  yes   2) Fhx early CAD (<55 men, <65 women):  no   3) cigarette smoking:  Yes, Details: former, quit    4) high BP >139/89 or on tx: yes   5) Low HDL-C (<40 men, <50 women):  no    Other ASCVD risk factors:     CKD:  Yes, Details: G3a/A2 at worst, denies excessive nsaids    Hypothyroidism: No     Social History:     Social History     Socioeconomic History   • Marital status:      Spouse name: Not on file   • Number of children: Not on file   • Years of education: Not on file   • Highest education level: Not on file   Occupational History   • Not on file   Social Needs   • Financial resource strain: Not on file   • Food insecurity:     Worry: Not on file     Inability: Not on file   • Transportation needs:     Medical: Not on file     Non-medical: Not on file   Tobacco Use   • Smoking status: Former Smoker     Packs/day: 2.00     Years: 25.00     Pack years: 50.00     Types: Cigarettes     Last attempt to quit: 1987     Years since quittin.6   • Smokeless tobacco: Never Used   Substance and Sexual Activity   • Alcohol use: Yes     Alcohol/week: 0.0 oz     Comment: a few times a year   • Drug use: No   • Sexual activity: Not on file     Comment:    Lifestyle   • Physical activity:     Days per week: Not on file     Minutes per session: Not on file   • Stress: Not on file  "  Relationships   • Social connections:     Talks on phone: Not on file     Gets together: Not on file     Attends [employer] service: Not on file     Active member of club or organization: Not on file     Attends meetings of clubs or organizations: Not on file     Relationship status: Not on file   • Intimate partner violence:     Fear of current or ex partner: Not on file     Emotionally abused: Not on file     Physically abused: Not on file     Forced sexual activity: Not on file   Other Topics Concern   • Not on file   Social History Narrative   • Not on file        Review of Systems:   Review of Systems   Constitutional: Negative for chills, fever and malaise/fatigue.   HENT: Negative for nosebleeds, sore throat and tinnitus.    Eyes: Negative for blurred vision and double vision.   Respiratory: Negative for cough, hemoptysis, shortness of breath and wheezing.    Cardiovascular: Negative for chest pain, palpitations, orthopnea and leg swelling.   Gastrointestinal: Negative for abdominal pain, blood in stool, diarrhea, melena, nausea and vomiting.   Genitourinary: Negative for hematuria.   Musculoskeletal: Negative for joint pain and myalgias.   Skin: Negative for itching and rash.   Neurological: Negative for dizziness, tremors, focal weakness, seizures, weakness and headaches.   Endo/Heme/Allergies: Does not bruise/bleed easily.   Psychiatric/Behavioral: Negative for depression. The patient is not nervous/anxious and does not have insomnia.         Objective:   Allergies:  Naproxen; Nsaids; Codeine; Morphine; Sulfa drugs; and Rosuvastatin calcium    Vitals:    08/01/19 1447 08/01/19 1458   BP: 153/67 130/63   BP Location: Left arm Left arm   Patient Position: Sitting Sitting   BP Cuff Size: Adult Adult   Pulse: 68 67   Weight: 103 kg (227 lb 1.6 oz)    Height: 1.676 m (5' 6\")      BP Readings from Last 5 Encounters:   08/01/19 130/63   06/24/19 142/71   02/12/19 158/92   01/15/19 120/80   11/21/18 142/78    "   Body mass index is 36.65 kg/m².    Outpatient Encounter Medications as of 8/1/2019   Medication Sig Dispense Refill   • chlorthalidone (HYGROTON) 25 MG Tab Take 0.5 Tabs by mouth every day for 360 days. 45 Tab 3   • spironolactone (ALDACTONE) 25 MG Tab Take 0.5 Tabs by mouth every day for 360 days. 45 Tab 3   • pravastatin (PRAVACHOL) 20 MG Tab Take 1 Tab by mouth every day for 360 days. 30 Tab 11   • amLODIPine (NORVASC) 5 MG Tab Take 1 Tab by mouth 2 Times a Day.     • trandolapril (MAVIK) 4 MG Tab Take 1 Tab by mouth every bedtime for 360 days. 90 Tab 3   • acarbose (PRECOSE) 50 MG Tab TAKE ONE TABLET BY MOUTH TWICE DAILY 180 Tab 3   • metFORMIN (GLUCOPHAGE) 500 MG Tab Take 1 Tab by mouth every day. 90 Tab 3   • [DISCONTINUED] pantoprazole (PROTONIX) 40 MG Tablet Delayed Response TAKE ONE TABLET BY MOUTH ONCE DAILY 90 Tab 3   • aspirin 81 MG EC tablet Take  by mouth.     • cyclobenzaprine (FLEXERIL) 10 MG Tab Take 1 Tab by mouth every 8 hours as needed for Muscle Spasms. 60 Tab 0   • doxycycline (VIBRAMYCIN) 100 MG Tab Take 1 Tab by mouth 2 times a day. 10 Tab 0   • therapeutic multivitamin-minerals (THERAGRAN-M) Tab Take 1 Tab by mouth every day.     • [DISCONTINUED] chlorproMAZINE (THORAZINE) 25 MG Tab Take 1 Tab by mouth 3 times a day. (Patient not taking: Reported on 8/1/2019) 6 Tab 0   • [DISCONTINUED] spironolactone (ALDACTONE) 25 MG Tab Take 1 Tab by mouth every day for 360 days. (Patient taking differently: Take 12.5 mg by mouth every day.) 90 Tab 3   • [DISCONTINUED] chlorthalidone (HYGROTON) 25 MG Tab Take 1 Tab by mouth every day for 360 days. (Patient not taking: Reported on 7/2/2019) 90 Tab 3   • [DISCONTINUED] rosuvastatin (CRESTOR) 5 MG Tab Take 5 mg by mouth every evening.     • testosterone cypionate (DEPO-TESTOSTERONE) 200 MG/ML Solution injection 1 mL by Intramuscular route every 21 days. 1 mL 11     No facility-administered encounter medications on file as of 8/1/2019.      Physical Exam    Constitutional: He is oriented to person, place, and time. He appears well-developed and well-nourished. He is cooperative. No distress.   HENT:   Head: Normocephalic and atraumatic.   Mouth/Throat: Oropharynx is clear and moist and mucous membranes are normal.   Eyes: Pupils are equal, round, and reactive to light. Conjunctivae are normal.   Neck: Trachea normal and normal range of motion. Neck supple. Normal carotid pulses and no JVD present. Carotid bruit is not present. No thyromegaly present.   Cardiovascular: Normal rate, regular rhythm, normal heart sounds and intact distal pulses. Exam reveals no gallop and no friction rub.   No murmur heard.  Pulses:       Carotid pulses are 2+ on the right side, and 2+ on the left side.       Radial pulses are 2+ on the right side, and 2+ on the left side.        Dorsalis pedis pulses are 2+ on the right side, and 2+ on the left side.        Posterior tibial pulses are 2+ on the right side, and 2+ on the left side.   Edema:    RLE: none     LLE: none   Spider telangectasia:       RLE:  None      LLE: none   Varicosities:         RLE: none      LLE: none   Corona phlebectatica:      RLE:  None        LLE:  None   Cording:         RLE:  None     LLE: None    Pulmonary/Chest: Effort normal and breath sounds normal. No respiratory distress.   Abdominal: Soft. Bowel sounds are normal. He exhibits no distension and no mass. There is no hepatosplenomegaly. There is no tenderness. There is no rebound and no guarding.   Musculoskeletal: He exhibits no edema.   Lymphadenopathy:     He has no cervical adenopathy.   Neurological: He is alert and oriented to person, place, and time. No cranial nerve deficit. Coordination and gait normal.   Skin: Skin is warm and dry. He is not diaphoretic. No cyanosis. No pallor. Nails show no clubbing.   Psychiatric: He has a normal mood and affect.        Accessory Clinical Findings:   Echocardiogram:  Results for orders placed or performed during  the hospital encounter of 17   ECHOCARDIOGRAM COMP W/O CONT   Result Value Ref Range    Eject.Frac. MOD BP 57.79     Eject.Frac. MOD 4C 56.25     Eject.Frac. MOD 2C 62.39       Lab Results   Component Value Date    CHOLSTRLTOT 148 2019    LDL 42 2019    HDL 27.0 (L) 2019    TRIGLYCERIDE 395 (H) 2019         Lab Results   Component Value Date    HBA1C 6.4 01/15/2019      Lab Results   Component Value Date    SODIUM 138 2019    POTASSIUM 4.4 2019    CHLORIDE 104 2019    CO2 24 2019    GLUCOSE 149 (H) 2019    BUN 15 2019    CREATININE 1.2 2019      Lab Results   Component Value Date    ALDOSTERONE 28 2019      Lab Results   Component Value Date    URCREAT 242.61 2019    MICROALBUR 156.5 (H) 2019    MALBCRT 65 (H) 2019     Lab Results   Component Value Date    STMTRPT  2017     [facility]    Test Date:  2017  Pt Name:    MADELEINE MACHADO                 Department: [facility]  MRN:        [id number]                      Room:       [facility]  Gender:     M                            Technician: [facility]  :        1945                   Requested By:ISMAEL BENAVIDEZ  Order #:    [id number]                    [place] MD: Carina Ross MD    Measurements  Intervals                                Axis  Rate:       79                           P:          58  KS:         176                          QRS:        -39  QRSD:       94                           T:          59  QT:         396  QTc:        455    Interpretive Statements  SINUS RHYTHM  LEFT AXIS DEVIATION  LATE PRECORDIAL R/S TRANSITION  Compared to ECG 2017 10:25:19  Left-axis deviation now present  Left bundle-branch block no longer present    Electronically Signed On 2017 14:26:26 PDT by Carina Ross MD       VASCULAR IMAGING:     Echo 2017  No prior study is available for comparison.   Left ventricular ejection fraction is visually  estimated to be 60%.   Normal diastolic function. Poor endocardial definition difficult to   assess wall motion.  No evidence of valvular abnormality based on Doppler evaluation.   Estimated right ventricular systolic pressure  is 25 mmHg.    Echo 7/25/19  Normal left ventricular systolic function. Left ventricular ejection   fraction is visually estimated to be 55%.   Normal diastolic function.    Renal art duplex 7/31/19  Unremarkable renal sonogram with no evidence of renal artery stenosis as demonstrated above      Javad Childs M.D.

## 2019-08-23 ENCOUNTER — OFFICE VISIT (OUTPATIENT)
Dept: VASCULAR LAB | Facility: MEDICAL CENTER | Age: 74
End: 2019-08-23
Attending: FAMILY MEDICINE
Payer: COMMERCIAL

## 2019-08-23 VITALS
BODY MASS INDEX: 35.86 KG/M2 | HEART RATE: 74 BPM | SYSTOLIC BLOOD PRESSURE: 137 MMHG | HEIGHT: 66 IN | DIASTOLIC BLOOD PRESSURE: 59 MMHG | WEIGHT: 223.1 LBS

## 2019-08-23 DIAGNOSIS — E11.9 TYPE 2 DIABETES MELLITUS WITHOUT COMPLICATION, WITHOUT LONG-TERM CURRENT USE OF INSULIN (HCC): ICD-10-CM

## 2019-08-23 DIAGNOSIS — M79.10 MYALGIA: ICD-10-CM

## 2019-08-23 DIAGNOSIS — Z79.02 LONG TERM (CURRENT) USE OF ANTITHROMBOTICS/ANTIPLATELETS: ICD-10-CM

## 2019-08-23 DIAGNOSIS — E78.5 DYSLIPIDEMIA: Chronic | ICD-10-CM

## 2019-08-23 DIAGNOSIS — G47.33 OBSTRUCTIVE SLEEP APNEA SYNDROME: ICD-10-CM

## 2019-08-23 DIAGNOSIS — I1A.0 RESISTANT HYPERTENSION: ICD-10-CM

## 2019-08-23 DIAGNOSIS — E78.1 HYPERTRIGLYCERIDEMIA: ICD-10-CM

## 2019-08-23 DIAGNOSIS — I25.10 CORONARY ARTERY DISEASE INVOLVING NATIVE HEART WITHOUT ANGINA PECTORIS, UNSPECIFIED VESSEL OR LESION TYPE: ICD-10-CM

## 2019-08-23 PROCEDURE — 99214 OFFICE O/P EST MOD 30 MIN: CPT | Performed by: FAMILY MEDICINE

## 2019-08-23 PROCEDURE — 99212 OFFICE O/P EST SF 10 MIN: CPT | Performed by: FAMILY MEDICINE

## 2019-08-23 RX ORDER — EPLERENONE 25 MG/1
25 TABLET, FILM COATED ORAL EVERY MORNING
Qty: 90 TAB | Refills: 3 | Status: SHIPPED | OUTPATIENT
Start: 2019-08-23 | End: 2019-09-20

## 2019-08-23 ASSESSMENT — ENCOUNTER SYMPTOMS
MYALGIAS: 0
HEMOPTYSIS: 0
FEVER: 0
INSOMNIA: 0
FOCAL WEAKNESS: 0
WHEEZING: 0
HEADACHES: 0
SORE THROAT: 0
SEIZURES: 0
BLOOD IN STOOL: 0
DOUBLE VISION: 0
TREMORS: 0
VOMITING: 0
SHORTNESS OF BREATH: 0
CHILLS: 0
NAUSEA: 0
BRUISES/BLEEDS EASILY: 0
COUGH: 0
ABDOMINAL PAIN: 0
PALPITATIONS: 0
NERVOUS/ANXIOUS: 0
ORTHOPNEA: 0
DIZZINESS: 0
BLURRED VISION: 0
DEPRESSION: 0
WEAKNESS: 0
DIARRHEA: 0

## 2019-08-23 NOTE — NON-PROVIDER
Patient reports many headaches, nausea, increased appetite, decreased libido, and severe muscle pains in lower legs and arms.    Fracisco Castle. [facility]'  [facility]

## 2019-08-23 NOTE — PROGRESS NOTES
[facility]N [facility] - INITIAL VISIT   8/23/19    Assessment / Plan:   1. Blood Pressure Control:  Qualifies for resistant HTN due to 4 meds w/o control   ACC/AHA (2017) goal <130/80  Home BP at goal:  No, possibly masked HTN   Office BP at goal:  No, white coat effect   Device candidate? No due to age   Plan:   Monitoring:   - continue home BP monitoring, reviewed correct technique:  Yes   - order 24h ABPM:  UNDECIDED, will consider if persistent white coat effect  - monitor lytes/gfr routinely   - advised that stabilizing BP may require multiple med changes and close monitoring over the next several months, reviewed that initially there will be additional imaging and labs and the possibility of adverse drug rxn's and variability of his BP and HR until we have things stabilized.  Advised to contact our office as needed for questions or concerns.    2. Work up of Secondary Causes of Resistant Hypertension:   Renovascular HTN: Excluded  Primary Aldosteronism: Not Yet Assessed  Thyroid Function: Excluded  Obstructive Sleep Apnea: using BiPAP, reports good compliance, pending with pulm MD in 2-3 weeks, minimal good nights sleep   Pheochromocytoma: Not Yet Assessed  Other: none  Recommendations At This Visit:        3. Medication Use / Adherence:  Assessment: Complete  Recommendations: Instructed to Continue Taking All Medications As Prescribed         4. End Organ Damage:   Left Ventricular Hypertrophy: Absent on  Echocardiogram Date: 2017  Albuminuria: Microalbuminuria, level A2 on Date: 10/2018  Renal Function: Chronic Kidney Disease  Stage 3a  Established Cardiovascular Disease: Present: hx of CAD, s/p CABG    5. Lifestyle Recommendations:  Smoking: continued complete avoidance of all tobacco products     Physical Activity: continue healthy activity to improve CV fitness, see care instructions for additional details     Weight Management and Nutrition: Dietary plan was discussed with patient at this visit including  DASH, low sodium and/or as outlined in care instructions     6. Standard HTN Pharmacotherapy:  ACEI/ARB: continue trandolapril 4mg QHS for true 24h coverage, consider dose reduction due to ? ED    Thiazide Type Diuretic: stop due to worsening ED  Calcium Channel Blocker (CCB): continue amlodipine 5mg BID, venodilatory edema should be balanced by eplerenone    7. Additional Agents:  Aldosterone Antagonist: stop spironolactone, start eplerenone 25mg, will increase to 50mg then max 50mg BID if needed   Loop Diuretic: none   Peripheral Alpha Blocker: add doxazosin 1-2mg QHS as next agent   Other CCB: none  Direct Vasodilator: none  Centrally Acting Alpha Agonist: prefer to avoid clonidine due to rebound HTN effects, will consider prazosin if BP spikes start occurring   Other:   BB: could consider carvedilol or nebivolol as future agents      8. Other CV Risk Factors:     Lipids:  Myalgias on rosuva and 1/2 dose pravastatin    NLA Risk Category:   Very high:  Evidence of ASCVD or T1/T2D with 2 or more RFs or evidence of end-org damage (CKD, ACR>29, retinopathy)  Tx threshold:  non-HDL-C >99, LDL-C >69  Tx goal: non-HDL-C <100,  LDL-C <70  (optional: apoB <80)  At goal:  yes  Plan:  - reinforced ongoing TLC measures   - continue prava 20mg 1/2 tab daily  - update labs including CPK levels   - start vitamin D3 3668-4076 daily     DM: type 2, non-insulin  Last A1c = 6.4%  Goal A1c <7.0%  ACR: abnormal: A2  Plan:  - recommmend for routine care with PCP (or endocrine) to include regular A1c monitoring, annual albumin/creatinine ratio (ACR), annual diabetic retinopathy screening, foot exams, annual flu vaccine, and updates to pneumonia vaccines as appropriate     Smoking: ended year 1987 or pack years of use 25     Antiplatelet Therapy: continue ASA 81mg daily      9. Other Issues:    - Afib - hx of paroxysmal Afib post-op CABG.  No current, has been off BB and xarelto in 2013.   - Continue monitoring per cardiology     -  hypogonadism, erectile dysfunction   - monitor while using chlorthalidone and spirono for ADRs    - CHOCO -continue f/u with sleep MD     Studies Ordered At This Visit:  None   Blood Work to Be Obtained Prior to Next Visit:  Cmp, cbc, cpk  Disposition: RTC in 4 weeks    Diagnosis:  1. Dyslipidemia  Comp Metabolic Panel    CREATINE KINASE    CBC WITH DIFFERENTIAL   2. Resistant hypertension     3. Coronary artery disease involving native heart without angina pectoris, unspecified vessel or lesion type     4. Long term (current) use of antithrombotics/antiplatelets     5. Type 2 diabetes mellitus without complication, without long-term current use of insulin (HCC)     6. Obstructive sleep apnea syndrome     7. Hypertriglyceridemia     8. Myalgia  Comp Metabolic Panel        History of Present Illness:   Wang Forman Jr. is a male seen for evaluation of resistant HTN and management.     Wang Forman Jr. is referred by:cardiologist Scott Cameron,*.    HTN:  Current HTN concerns:  Reports multiple med issues but noting BP improving. Current ADRs: increased ED, nausea, increased appetite, unstable while walking  HTN sx:  No current blurred or changed vision, chest pain, shortness of breath, headache, nausea, dizziness/vertigo   Home BP lo-150s/70-80s  24h ABPM completed: not tested  Adherence to current HTN meds: compliant all of the time     Antiplatelet/anticoagulation:   Yes, Details: ASA 81mg, no bleeding issues     Hyperlipidemia:    Stopped rosuva, started prava and had scattered leg pains, reduced to 1/2 tab and has helpled   Current treatment: pravastatin 20mg 1/2 tab   Myalgias? yes  Other adverse drug reactions? No  Lipid profile:   Lab Results   Component Value Date/Time    CHOLSTRLTOT 148 2019 01:20 PM    LDL 42 2019 01:20 PM    HDL 27.0 (L) 2019 01:20 PM    TRIGLYCERIDE 395 (H) 2019 01:20 PM       T2D:   No sx. No lows. Last A1c 6.4%, currently on  metformin.  Highest 6.9%    CHOCO:   Still daily somnolence and reduced energy. feels poorly rested daily, currently on Bipap, seening pulm MD.    Pertinent HTN hx (reviewed at initial visit):   Age at HTN dx: >10yrs  Past HTN medications: unsure of prior   HTN crises:  No prior hospitalization or crises   Lifestyle factors:     High salt: Yes, Details: knows to keep Na low    EtOH: No  Interfering substances:     NSAIDs: No    Decongestants. No   Stimulants/drugs: No    Clinical evidence of ASCVD:     1) hx of MI/ACS:  Yes, Details: s/p CABG  (Dr. Love)   2) coronary or other revascularization procedure: PCI with stenting   3) TIA/ischemic CVA: No   4) PAD (including RAKEL <0.9): No   5) documented (CAD, Renal athero, AA due to athero, carotid plaque >50% stenosis: No    Major RFs:     1) Age (Men>44, women>54):  yes   2) Fhx early CAD (<55 men, <65 women):  no   3) cigarette smoking:  Yes, Details: former, quit    4) high BP >139/89 or on tx: yes   5) Low HDL-C (<40 men, <50 women):  no    Other ASCVD risk factors:     CKD:  Yes, Details: G3a/A2 at worst, denies excessive nsaids    Hypothyroidism: No     Social History:     Social History     Socioeconomic History   • Marital status:      Spouse name: Not on file   • Number of children: Not on file   • Years of education: Not on file   • Highest education level: Not on file   Occupational History   • Not on file   Social Needs   • Financial resource strain: Not on file   • Food insecurity:     Worry: Not on file     Inability: Not on file   • Transportation needs:     Medical: Not on file     Non-medical: Not on file   Tobacco Use   • Smoking status: Former Smoker     Packs/day: 2.00     Years: 25.00     Pack years: 50.00     Types: Cigarettes     Last attempt to quit: 1987     Years since quittin.6   • Smokeless tobacco: Never Used   Substance and Sexual Activity   • Alcohol use: Yes     Alcohol/week: 0.0 oz     Comment: a few times a year    • Drug use: No   • Sexual activity: Not on file     Comment:    Lifestyle   • Physical activity:     Days per week: Not on file     Minutes per session: Not on file   • Stress: Not on file   Relationships   • Social connections:     Talks on phone: Not on file     Gets together: Not on file     Attends [employer] service: Not on file     Active member of club or organization: Not on file     Attends meetings of clubs or organizations: Not on file     Relationship status: Not on file   • Intimate partner violence:     Fear of current or ex partner: Not on file     Emotionally abused: Not on file     Physically abused: Not on file     Forced sexual activity: Not on file   Other Topics Concern   • Not on file   Social History Narrative   • Not on file        Review of Systems:   Review of Systems   Constitutional: Negative for chills, fever and malaise/fatigue.   HENT: Negative for nosebleeds, sore throat and tinnitus.    Eyes: Negative for blurred vision and double vision.   Respiratory: Negative for cough, hemoptysis, shortness of breath and wheezing.    Cardiovascular: Negative for chest pain, palpitations, orthopnea and leg swelling.   Gastrointestinal: Negative for abdominal pain, blood in stool, diarrhea, melena, nausea and vomiting.   Genitourinary: Negative for hematuria.   Musculoskeletal: Negative for joint pain and myalgias.   Skin: Negative for itching and rash.   Neurological: Negative for dizziness, tremors, focal weakness, seizures, weakness and headaches.   Endo/Heme/Allergies: Does not bruise/bleed easily.   Psychiatric/Behavioral: Negative for depression. The patient is not nervous/anxious and does not have insomnia.         Objective:   Allergies:  Naproxen; Nsaids; Codeine; Morphine; Sulfa drugs; and Rosuvastatin calcium    Vitals:    08/23/19 1003 08/23/19 1012   BP: 138/72 137/59   BP Location: Left arm Left arm   Patient Position: Sitting Sitting   BP Cuff Size: Adult Adult   Pulse: 79 74  "  Weight: 101.2 kg (223 lb 1.6 oz)    Height: 1.676 m (5' 6\")      BP Readings from Last 5 Encounters:   08/23/19 137/59   08/01/19 130/63   06/24/19 142/71   02/12/19 158/92   01/15/19 120/80      Body mass index is 36.01 kg/m².    Outpatient Encounter Medications as of 8/23/2019   Medication Sig Dispense Refill   • cholecalciferol (VITAMIN D3) 400 UNIT Tab Take 400 Units by mouth every day.     • eplerenone (INSPRA) 25 MG Tab Take 1 Tab by mouth every morning for 360 days. 90 Tab 3   • pantoprazole (PROTONIX) 40 MG Tablet Delayed Response TAKE 1 TABLET BY MOUTH ONCE DAILY 90 Tab 3   • pravastatin (PRAVACHOL) 20 MG Tab Take 1 Tab by mouth every day for 360 days. (Patient taking differently: Take 10 mg by mouth every day.) 30 Tab 11   • amLODIPine (NORVASC) 5 MG Tab Take 1 Tab by mouth 2 Times a Day.     • trandolapril (MAVIK) 4 MG Tab Take 1 Tab by mouth every bedtime for 360 days. 90 Tab 3   • acarbose (PRECOSE) 50 MG Tab TAKE ONE TABLET BY MOUTH TWICE DAILY 180 Tab 3   • metFORMIN (GLUCOPHAGE) 500 MG Tab Take 1 Tab by mouth every day. 90 Tab 3   • aspirin 81 MG EC tablet Take  by mouth.     • cyclobenzaprine (FLEXERIL) 10 MG Tab Take 1 Tab by mouth every 8 hours as needed for Muscle Spasms. 60 Tab 0   • doxycycline (VIBRAMYCIN) 100 MG Tab Take 1 Tab by mouth 2 times a day. 10 Tab 0   • therapeutic multivitamin-minerals (THERAGRAN-M) Tab Take 1 Tab by mouth every day.     • testosterone cypionate (DEPO-TESTOSTERONE) 200 MG/ML Solution injection 1 mL by Intramuscular route every 21 days. 1 mL 11   • [DISCONTINUED] chlorthalidone (HYGROTON) 25 MG Tab Take 0.5 Tabs by mouth every day for 360 days. 45 Tab 3   • [DISCONTINUED] spironolactone (ALDACTONE) 25 MG Tab Take 0.5 Tabs by mouth every day for 360 days. 45 Tab 3     No facility-administered encounter medications on file as of 8/23/2019.      Physical Exam   Constitutional: He is oriented to person, place, and time. He appears well-developed and well-nourished. He " is cooperative. No distress.   HENT:   Head: Normocephalic and atraumatic.   Mouth/Throat: Oropharynx is clear and moist and mucous membranes are normal.   Eyes: Pupils are equal, round, and reactive to light. Conjunctivae are normal.   Neck: Trachea normal and normal range of motion. Neck supple. Normal carotid pulses and no JVD present. Carotid bruit is not present. No thyromegaly present.   Cardiovascular: Normal rate, regular rhythm, normal heart sounds and intact distal pulses. Exam reveals no gallop and no friction rub.   No murmur heard.  Pulses:       Carotid pulses are 2+ on the right side, and 2+ on the left side.       Radial pulses are 2+ on the right side, and 2+ on the left side.        Dorsalis pedis pulses are 2+ on the right side, and 2+ on the left side.        Posterior tibial pulses are 2+ on the right side, and 2+ on the left side.   Edema:    RLE: none     LLE: none   Spider telangectasia:       RLE:  None      LLE: none   Varicosities:         RLE: none      LLE: none   Corona phlebectatica:      RLE:  None        LLE:  None   Cording:         RLE:  None     LLE: None    Pulmonary/Chest: Effort normal and breath sounds normal. No respiratory distress.   Abdominal: Soft. Bowel sounds are normal. He exhibits no distension and no mass. There is no hepatosplenomegaly. There is no tenderness. There is no rebound and no guarding.   Musculoskeletal: He exhibits no edema.   Lymphadenopathy:     He has no cervical adenopathy.   Neurological: He is alert and oriented to person, place, and time. No cranial nerve deficit. Coordination and gait normal.   Skin: Skin is warm and dry. He is not diaphoretic. No cyanosis. No pallor. Nails show no clubbing.   Psychiatric: He has a normal mood and affect.        Accessory Clinical Findings:   Echocardiogram:  Results for orders placed or performed during the hospital encounter of 09/26/17   ECHOCARDIOGRAM COMP W/O CONT   Result Value Ref Range    Eject.Frac. MOD  BP 57.79     Eject.Frac. MOD 4C 56.25     Eject.Frac. MOD 2C 62.39       Lab Results   Component Value Date    CHOLSTRLTOT 148 2019    LDL 42 2019    HDL 27.0 (L) 2019    TRIGLYCERIDE 395 (H) 2019         Lab Results   Component Value Date    HBA1C 6.4 01/15/2019      Lab Results   Component Value Date    SODIUM 138 2019    POTASSIUM 4.4 2019    CHLORIDE 104 2019    CO2 24 2019    GLUCOSE 149 (H) 2019    BUN 15 2019    CREATININE 1.2 2019      Lab Results   Component Value Date    ALDOSTERONE 28 2019      Lab Results   Component Value Date    URCREAT 242.61 2019    MICROALBUR 156.5 (H) 2019    MALBCRT 65 (H) 2019     Lab Results   Component Value Date    STMTRPT  2017     [facility]    Test Date:  2017  Pt Name:    MADELEINE MACHADO                 Department: [facility]  MRN:        [id number]                      Room:       [facility]  Gender:     M                            Technician: [facility]  :        [id number]                   Requested By:ISMAEL BENAVIDEZ  Order #:    [id number]                    Reading MD: Carina Ross MD    Measurements  Intervals                                Axis  Rate:       79                           P:          58  RI:         176                          QRS:        -39  QRSD:       94                           T:          59  QT:         396  QTc:        455    Interpretive Statements  SINUS RHYTHM  LEFT AXIS DEVIATION  LATE PRECORDIAL R/S TRANSITION  Compared to ECG 2017 10:25:19  Left-axis deviation now present  Left bundle-branch block no longer present    Electronically Signed On 2017 14:26:26 PDT by Carina Ross MD       VASCULAR IMAGING:     Echo   No prior study is available for comparison.   Left ventricular ejection fraction is visually estimated to be 60%.   Normal diastolic function. Poor endocardial definition difficult to   assess wall motion.  No  evidence of valvular abnormality based on Doppler evaluation.   Estimated right ventricular systolic pressure  is 25 mmHg.    Echo 7/25/19  Normal left ventricular systolic function. Left ventricular ejection   fraction is visually estimated to be 55%.   Normal diastolic function.    Renal art duplex 7/31/19  Unremarkable renal sonogram with no evidence of renal artery stenosis as demonstrated above      Javad Childs M.D.

## 2019-08-29 ENCOUNTER — TELEPHONE (OUTPATIENT)
Dept: VASCULAR LAB | Facility: MEDICAL CENTER | Age: 74
End: 2019-08-29

## 2019-08-29 NOTE — TELEPHONE ENCOUNTER
Called and spoke to patient to inform that his labs were normal except elevated blood sugar at 178. Dr. Childs will go over results in more detail at next visit.    Sid Poe, Med. [facility]

## 2019-09-20 ENCOUNTER — OFFICE VISIT (OUTPATIENT)
Dept: VASCULAR LAB | Facility: MEDICAL CENTER | Age: 74
End: 2019-09-20
Attending: FAMILY MEDICINE
Payer: COMMERCIAL

## 2019-09-20 VITALS
HEIGHT: 66 IN | SYSTOLIC BLOOD PRESSURE: 136 MMHG | WEIGHT: 225.2 LBS | HEART RATE: 71 BPM | DIASTOLIC BLOOD PRESSURE: 69 MMHG | BODY MASS INDEX: 36.19 KG/M2

## 2019-09-20 DIAGNOSIS — Z79.02 LONG TERM (CURRENT) USE OF ANTITHROMBOTICS/ANTIPLATELETS: ICD-10-CM

## 2019-09-20 DIAGNOSIS — G47.33 OBSTRUCTIVE SLEEP APNEA SYNDROME: ICD-10-CM

## 2019-09-20 DIAGNOSIS — E11.9 TYPE 2 DIABETES MELLITUS WITHOUT COMPLICATION, WITHOUT LONG-TERM CURRENT USE OF INSULIN (HCC): ICD-10-CM

## 2019-09-20 DIAGNOSIS — E78.1 HYPERTRIGLYCERIDEMIA: ICD-10-CM

## 2019-09-20 DIAGNOSIS — R03.0 ELEVATED BLOOD PRESSURE READING WITHOUT DIAGNOSIS OF HYPERTENSION: ICD-10-CM

## 2019-09-20 DIAGNOSIS — I1A.0 RESISTANT HYPERTENSION: ICD-10-CM

## 2019-09-20 DIAGNOSIS — I25.10 CORONARY ARTERY DISEASE INVOLVING NATIVE HEART WITHOUT ANGINA PECTORIS, UNSPECIFIED VESSEL OR LESION TYPE: ICD-10-CM

## 2019-09-20 DIAGNOSIS — E78.5 DYSLIPIDEMIA: ICD-10-CM

## 2019-09-20 PROCEDURE — 99212 OFFICE O/P EST SF 10 MIN: CPT | Performed by: FAMILY MEDICINE

## 2019-09-20 PROCEDURE — 99214 OFFICE O/P EST MOD 30 MIN: CPT | Performed by: FAMILY MEDICINE

## 2019-09-20 RX ORDER — EPLERENONE 50 MG/1
1 TABLET, FILM COATED ORAL DAILY
Qty: 90 TAB | Refills: 3 | Status: SHIPPED | OUTPATIENT
Start: 2019-09-20 | End: 2019-12-12

## 2019-09-20 RX ORDER — EZETIMIBE 10 MG/1
10 TABLET ORAL DAILY
Qty: 90 TAB | Refills: 3 | Status: SHIPPED | OUTPATIENT
Start: 2019-09-20 | End: 2020-08-31

## 2019-09-20 ASSESSMENT — ENCOUNTER SYMPTOMS
SORE THROAT: 0
DIZZINESS: 0
ABDOMINAL PAIN: 0
BRUISES/BLEEDS EASILY: 0
WEAKNESS: 0
VOMITING: 0
DEPRESSION: 0
DIARRHEA: 0
CHILLS: 0
NERVOUS/ANXIOUS: 0
INSOMNIA: 0
SEIZURES: 0
TREMORS: 0
ORTHOPNEA: 0
PALPITATIONS: 0
BLOOD IN STOOL: 0
SHORTNESS OF BREATH: 0
COUGH: 0
HEMOPTYSIS: 0
MYALGIAS: 0
NAUSEA: 0
FEVER: 0
FOCAL WEAKNESS: 0
HEADACHES: 0
WHEEZING: 0
DOUBLE VISION: 0
BLURRED VISION: 0

## 2019-09-20 NOTE — PATIENT INSTRUCTIONS
1) eat potassium rich diet, DASH diet   2) stop pravastatin, start zetia   3) increase eplerenone to 50mg   I have ordered a 24 hour BP monitor - my medical assistant will contact you, call [phone] if you do not get a call       Physical activity:  - engage in moderate to vigorous physical activity such as brisk walking, swimming, cycling, >150 minutes per week at 30-40 minutes per session, 3 to 5 times weekly or as directed at today's visit     General healthly nutrition advice:  - the USDA food pattern (https://www.Toovarip.usda.gov/USDAFoodPatterns)  - plate method (https://www.choosemyplate.gov/)  - consume diet that emphasizes intake of vegetables, fruits, and whole grains,  - use low fat diary products, poultry, fish, legumes, nontropical vegetable oils, nuts  - limit intake of sweets, sugar-sweetned beverages, and red meats   - reduce saturated and trans fats to <6% of your daily calories   - consume no more than 2,400mg of sodium daily (look at food labels) or if you have high BP then reduce to no more than 1,500mg of sodium daily     BP lowering diet:   - DASH diet (https://www.heart.org/en/health-topics/high-blood-pressure/changes-you-can-make-to-manage-high-blood-pressure/managing-blood-pressure-with-a-heart-healthy-diet)    Cholesterol-reducing diets:   - AHA diet  (http://www.heart.org/en/healthy-living/healthy-eating/eat-smart/nutrition-basics/aha-diet-and-lifestyle-recommendations)   - Mediterranean diet (http://www.heart.org/en/healthy-living/healthy-eating/eat-smart/nutrition-basics/mediterranean-diet)   - lowering triglycerides: (http://my.americanheart.org/idc/groups/ahamah-public/@wc/@sop/@[facility]/documents/downloadable/[facility]_425988.pdf)

## 2019-09-20 NOTE — PROGRESS NOTES
[facility] - INITIAL VISIT   9/20/19    Assessment / Plan:   1. Blood Pressure Control:  Qualifies for resistant HTN due to 4 meds w/o control   ACC/AHA (2017) goal <130/80  Home BP at goal:  No, possibly masked HTN   Office BP at goal:  No, white coat effect   Device candidate? No due to age   Plan:   Monitoring:   - continue home BP monitoring, reviewed correct technique:  Yes   - order 24h ABPM:  Ordered today to complete in about 3 wks   - monitor lytes/gfr routinely   - advised that stabilizing BP may require multiple med changes and close monitoring over the next several months, reviewed that initially there will be additional imaging and labs and the possibility of adverse drug rxn's and variability of his BP and HR until we have things stabilized.  Advised to contact our office as needed for questions or concerns.    2. Work up of Secondary Causes of Resistant Hypertension:   Renovascular HTN: Excluded  Primary Aldosteronism: Not Yet Assessed  Thyroid Function: Excluded  Obstructive Sleep Apnea: using BiPAP, reports good compliance, pending with pulm MD in 2-3 weeks, minimal good nights sleep   Pheochromocytoma: Not Yet Assessed  Other: none  Recommendations At This Visit:        3. Medication Use / Adherence:  Assessment: Complete  Recommendations: Instructed to Continue Taking All Medications As Prescribed         4. End Organ Damage:   Left Ventricular Hypertrophy: Absent on  Echocardiogram Date: 2017  Albuminuria: Microalbuminuria, level A2 on Date: 10/2018  Renal Function: Chronic Kidney Disease  Stage 3a  Established Cardiovascular Disease: Present: hx of CAD, s/p CABG    5. Lifestyle Recommendations:  Smoking: continued complete avoidance of all tobacco products     Physical Activity: continue healthy activity to improve CV fitness, see care instructions for additional details     Weight Management and Nutrition: Dietary plan was discussed with patient at this visit including DASH, low  sodium and/or as outlined in care instructions     6. Standard HTN Pharmacotherapy:  ACEI/ARB: continue trandolapril 4mg QHS for true 24h coverage, consider dose reduction due to ? ED    Thiazide Type Diuretic: stop due to worsening ED  Calcium Channel Blocker (CCB): continue amlodipine 5mg BID, venodilatory edema should be balanced by eplerenone    7. Additional Agents:  Aldosterone Antagonist: stopped spironolactone due to ED, increase eplerenone 50mg, increase to BID as next step   Loop Diuretic: none   Peripheral Alpha Blocker: add doxazosin 1-2mg QHS as next agent   Other CCB: none  Direct Vasodilator: none  Centrally Acting Alpha Agonist: prefer to avoid clonidine due to rebound HTN effects, will consider prazosin if BP spikes start occurring   Other:   BB: could consider carvedilol or nebivolol as future agents      8. Other CV Risk Factors:     Lipids:  Myalgias on rosuva and 1/2 dose pravastatin    NLA Risk Category:   Very high:  Evidence of ASCVD or T1/T2D with 2 or more RFs or evidence of end-org damage (CKD, ACR>29, retinopathy)  Tx threshold:  non-HDL-C >99, LDL-C >69  Tx goal: non-HDL-C <100,  LDL-C <70  (optional: apoB <80)  At goal:  yes  Plan:  - reinforced ongoing TLC measures   - stop prava, start zetia   - update labs including CPK levels   - start vitamin D3 7222-8578 daily     DM: type 2, non-insulin  Last A1c = 6.4%  Goal A1c <7.0%  ACR: abnormal: A2  Plan:  - recommmend for routine care with PCP (or endocrine) to include regular A1c monitoring, annual albumin/creatinine ratio (ACR), annual diabetic retinopathy screening, foot exams, annual flu vaccine, and updates to pneumonia vaccines as appropriate     Smoking: ended year 1987 or pack years of use 25     Antiplatelet Therapy: continue ASA 81mg daily      9. Other Issues:    - Afib - hx of paroxysmal Afib post-op CABG.  No current, has been off BB and xarelto in 2013.   - Continue monitoring per cardiology     - hypogonadism, erectile  dysfunction   - monitor while using chlorthalidone and spirono for ADRs    - CHOCO -continue f/u with sleep MD     Studies Ordered At This Visit:  None   Blood Work to Be Obtained Prior to Next Visit:  A1c, direct LDL, lipid, cmp in 3mo  Disposition: 3mo with me     Diagnosis:  1. Resistant hypertension     2. Dyslipidemia  Comp Metabolic Panel    Lipid Profile    LDL, DIRECT   3. Coronary artery disease involving native heart without angina pectoris, unspecified vessel or lesion type     4. Long term (current) use of antithrombotics/antiplatelets     5. Type 2 diabetes mellitus without complication, without long-term current use of insulin (Formerly [facility])  HEMOGLOBIN A1C (Glycohemoglobin GHB Total/A1C with MBG Estimate)   6. Obstructive sleep apnea syndrome     7. Hypertriglyceridemia  Lipid Profile    LDL, DIRECT   8. Elevated blood pressure reading without diagnosis of hypertension  REFERRAL TO 24-HOUR BLOOD PRESSURE MONITORING        History of Present Illness:   Wang Forman Jr. is a male seen for evaluation of resistant HTN and management.     Wang Forman Jr. is referred by:cardiologist Scott Cameron,*.    HTN:  Current HTN concerns:  Reports multiple med issues but noting BP improving. Current ADRs: increased ED, nausea, increased appetite, unstable while walking  HTN sx:  No current blurred or changed vision, chest pain, shortness of breath, headache, nausea, dizziness/vertigo   Home BP lo-150s/70-80s  24h ABPM completed: not tested  Adherence to current HTN meds: compliant all of the time     Antiplatelet/anticoagulation:   Yes, Details: ASA 81mg, no bleeding issues     Hyperlipidemia:    Continued on pravastatin w/o myalgias.   Current treatment: pravastatin 20mg 1/2 tab   Myalgias? Yes, persistent   Other adverse drug reactions? No  Lipid profile:   Lab Results   Component Value Date/Time    CHOLSTRLTOT 148 2019 01:20 PM    LDL 42 2019 01:20 PM    HDL 27.0 (L)  07/01/2019 01:20 PM    TRIGLYCERIDE 395 (H) 07/01/2019 01:20 PM       T2D:   No sx. No lows. Last A1c 6.4%, currently on metformin.  Highest 6.9%    CHOCO:   Still daily somnolence and reduced energy. feels poorly rested daily, currently on Bipap, seening pulm MD.    Pertinent HTN hx (reviewed at initial visit):   Age at HTN dx: >10yrs  Past HTN medications: unsure of prior   HTN crises:  No prior hospitalization or crises   Lifestyle factors:     High salt: Yes, Details: knows to keep Na low    EtOH: No  Interfering substances:     NSAIDs: No    Decongestants. No   Stimulants/drugs: No    Clinical evidence of ASCVD:     1) hx of MI/ACS:  Yes, Details: s/p CABG 2013 (Dr. Love)   2) coronary or other revascularization procedure: PCI with stenting   3) TIA/ischemic CVA: No   4) PAD (including RAKEL <0.9): No   5) documented (CAD, Renal athero, AA due to athero, carotid plaque >50% stenosis: No    Major RFs:     1) Age (Men>44, women>54):  yes   2) Fhx early CAD (<55 men, <65 women):  no   3) cigarette smoking:  Yes, Details: former, quit 1987   4) high BP >139/89 or on tx: yes   5) Low HDL-C (<40 men, <50 women):  no    Other ASCVD risk factors:     CKD:  Yes, Details: G3a/A2 at worst, denies excessive nsaids    Hypothyroidism: No    Outpatient Encounter Medications as of 9/20/2019   Medication Sig Dispense Refill   • Potassium 99 MG Tab Take  by mouth.     • cholecalciferol (VITAMIN D3) 400 UNIT Tab Take 400 Units by mouth every day.     • eplerenone (INSPRA) 25 MG Tab Take 1 Tab by mouth every morning for 360 days. 90 Tab 3   • pantoprazole (PROTONIX) 40 MG Tablet Delayed Response TAKE 1 TABLET BY MOUTH ONCE DAILY 90 Tab 3   • pravastatin (PRAVACHOL) 20 MG Tab Take 1 Tab by mouth every day for 360 days. (Patient taking differently: Take 10 mg by mouth every day.) 30 Tab 11   • amLODIPine (NORVASC) 5 MG Tab Take 1 Tab by mouth 2 Times a Day.     • trandolapril (MAVIK) 4 MG Tab Take 1 Tab by mouth every bedtime for 360  days. 90 Tab 3   • acarbose (PRECOSE) 50 MG Tab TAKE ONE TABLET BY MOUTH TWICE DAILY 180 Tab 3   • metFORMIN (GLUCOPHAGE) 500 MG Tab Take 1 Tab by mouth every day. 90 Tab 3   • aspirin 81 MG EC tablet Take  by mouth.     • doxycycline (VIBRAMYCIN) 100 MG Tab Take 1 Tab by mouth 2 times a day. 10 Tab 0   • therapeutic multivitamin-minerals (THERAGRAN-M) Tab Take 1 Tab by mouth every day.     • testosterone cypionate (DEPO-TESTOSTERONE) 200 MG/ML Solution injection 1 mL by Intramuscular route every 21 days. 1 mL 11   • cyclobenzaprine (FLEXERIL) 10 MG Tab Take 1 Tab by mouth every 8 hours as needed for Muscle Spasms. (Patient not taking: Reported on 2019) 60 Tab 0     No facility-administered encounter medications on file as of 2019.       Social History:     Social History     Socioeconomic History   • Marital status:      Spouse name: Not on file   • Number of children: Not on file   • Years of education: Not on file   • Highest education level: Not on file   Occupational History   • Not on file   Social Needs   • Financial resource strain: Not on file   • Food insecurity:     Worry: Not on file     Inability: Not on file   • Transportation needs:     Medical: Not on file     Non-medical: Not on file   Tobacco Use   • Smoking status: Former Smoker     Packs/day: 2.00     Years: 25.00     Pack years: 50.00     Types: Cigarettes     Last attempt to quit: 1987     Years since quittin.7   • Smokeless tobacco: Never Used   Substance and Sexual Activity   • Alcohol use: Yes     Alcohol/week: 0.0 oz     Comment: a few times a year   • Drug use: No   • Sexual activity: Not on file     Comment:    Lifestyle   • Physical activity:     Days per week: Not on file     Minutes per session: Not on file   • Stress: Not on file   Relationships   • Social connections:     Talks on phone: Not on file     Gets together: Not on file     Attends [employer] service: Not on file     Active member of club or  "organization: Not on file     Attends meetings of clubs or organizations: Not on file     Relationship status: Not on file   • Intimate partner violence:     Fear of current or ex partner: Not on file     Emotionally abused: Not on file     Physically abused: Not on file     Forced sexual activity: Not on file   Other Topics Concern   • Not on file   Social History Narrative   • Not on file        Review of Systems:   Review of Systems   Constitutional: Negative for chills, fever and malaise/fatigue.   HENT: Negative for nosebleeds, sore throat and tinnitus.    Eyes: Negative for blurred vision and double vision.   Respiratory: Negative for cough, hemoptysis, shortness of breath and wheezing.    Cardiovascular: Negative for chest pain, palpitations, orthopnea and leg swelling.   Gastrointestinal: Negative for abdominal pain, blood in stool, diarrhea, melena, nausea and vomiting.   Genitourinary: Negative for hematuria.   Musculoskeletal: Negative for joint pain and myalgias.   Skin: Negative for itching and rash.   Neurological: Negative for dizziness, tremors, focal weakness, seizures, weakness and headaches.   Endo/Heme/Allergies: Does not bruise/bleed easily.   Psychiatric/Behavioral: Negative for depression. The patient is not nervous/anxious and does not have insomnia.         Objective:   Allergies:  Naproxen; Nsaids; Codeine; Morphine; Sulfa drugs; and Rosuvastatin calcium    Vitals:    09/20/19 0936 09/20/19 0946   BP: 138/66 136/69   BP Location: Left arm Left arm   Patient Position: Sitting Sitting   BP Cuff Size: Adult Adult   Pulse: 70 71   Weight: 102.2 kg (225 lb 3.2 oz)    Height: 1.676 m (5' 6\")      BP Readings from Last 5 Encounters:   09/20/19 136/69   08/23/19 137/59   08/01/19 130/63   06/24/19 142/71   02/12/19 158/92      Body mass index is 36.35 kg/m².    Physical Exam   Constitutional: He is oriented to person, place, and time. He appears well-developed and well-nourished. He is cooperative. " No distress.   HENT:   Head: Normocephalic and atraumatic.   Mouth/Throat: Oropharynx is clear and moist and mucous membranes are normal.   Eyes: Pupils are equal, round, and reactive to light. Conjunctivae are normal.   Neck: Trachea normal and normal range of motion. Neck supple. Normal carotid pulses and no JVD present. Carotid bruit is not present. No thyromegaly present.   Cardiovascular: Normal rate, regular rhythm, normal heart sounds and intact distal pulses. Exam reveals no gallop and no friction rub.   No murmur heard.  Pulses:       Carotid pulses are 2+ on the right side, and 2+ on the left side.       Radial pulses are 2+ on the right side, and 2+ on the left side.        Dorsalis pedis pulses are 2+ on the right side, and 2+ on the left side.        Posterior tibial pulses are 2+ on the right side, and 2+ on the left side.   Edema:    RLE: none     LLE: none   Spider telangectasia:       RLE:  None      LLE: none   Varicosities:         RLE: none      LLE: none   Corona phlebectatica:      RLE:  None        LLE:  None   Cording:         RLE:  None     LLE: None    Pulmonary/Chest: Effort normal and breath sounds normal. No respiratory distress.   Abdominal: Soft. Bowel sounds are normal. He exhibits no distension and no mass. There is no hepatosplenomegaly. There is no tenderness. There is no rebound and no guarding.   Musculoskeletal: He exhibits no edema.   Lymphadenopathy:     He has no cervical adenopathy.   Neurological: He is alert and oriented to person, place, and time. No cranial nerve deficit. Coordination and gait normal.   Skin: Skin is warm and dry. He is not diaphoretic. No cyanosis. No pallor. Nails show no clubbing.   Psychiatric: He has a normal mood and affect.        Accessory Clinical Findings:   Echocardiogram:  Results for orders placed or performed during the hospital encounter of 09/26/17   ECHOCARDIOGRAM COMP W/O CONT   Result Value Ref Range    Eject.Frac. MOD BP 57.79      Eject.Frac. MOD 4C 56.25     Eject.Frac. MOD 2C 62.39       Lab Results   Component Value Date    CHOLSTRLTOT 148 2019    LDL 42 2019    HDL 27.0 (L) 2019    TRIGLYCERIDE 395 (H) 2019         Lab Results   Component Value Date    HBA1C 6.4 01/15/2019      Lab Results   Component Value Date    SODIUM 137 2019    POTASSIUM 4.1 2019    CHLORIDE 102 2019    CO2 24 2019    GLUCOSE 178 (H) 2019    BUN 13 2019    CREATININE 1.3 2019      Lab Results   Component Value Date    ALDOSTERONE 28 2019      Lab Results   Component Value Date    URCREAT 242.61 2019    MICROALBUR 156.5 (H) 2019    MALBCRT 65 (H) 2019     Lab Results   Component Value Date    STMTRPT  2017     [facility]    Test Date:  2017  Pt Name:    MADELEINE MACHADO                 Department: [facility]  MRN:        [id number]                      Room:       [facility]  Gender:     M                            Technician: [facility]  :        [id number]                   Requested By:ISMAEL BENAVIDEZ  Order #:    [id number]                    Reading MD: Carina Ross MD    Measurements  Intervals                                Axis  Rate:       79                           P:          58  DC:         176                          QRS:        -39  QRSD:       94                           T:          59  QT:         396  QTc:        455    Interpretive Statements  SINUS RHYTHM  LEFT AXIS DEVIATION  LATE PRECORDIAL R/S TRANSITION  Compared to ECG 2017 10:25:19  Left-axis deviation now present  Left bundle-branch block no longer present    Electronically Signed On 2017 14:26:26 PDT by Carina Ross MD       VASCULAR IMAGING:     Echo 2017  No prior study is available for comparison.   Left ventricular ejection fraction is visually estimated to be 60%.   Normal diastolic function. Poor endocardial definition difficult to   assess wall motion.  No evidence of  valvular abnormality based on Doppler evaluation.   Estimated right ventricular systolic pressure  is 25 mmHg.    Echo 7/25/19  Normal left ventricular systolic function. Left ventricular ejection   fraction is visually estimated to be 55%.   Normal diastolic function.    Renal art duplex 7/31/19  Unremarkable renal sonogram with no evidence of renal artery stenosis as demonstrated above      Javad Childs M.D.

## 2019-12-04 NOTE — OR NURSING
"Chief Complaint   Patient presents with     URI       Initial Pulse 140   Temp 100.5  F (38.1  C) (Tympanic)   Resp 24   Ht 0.927 m (3' 0.5\")   Wt 13.6 kg (30 lb)   SpO2 98%   BMI 15.83 kg/m   Estimated body mass index is 15.83 kg/m  as calculated from the following:    Height as of this encounter: 0.927 m (3' 0.5\").    Weight as of this encounter: 13.6 kg (30 lb).  Medication Reconciliation: complete  Constance Spangler, JESICA  " Pt ready for discharge. Still awaiting bed assignment.

## 2019-12-05 ENCOUNTER — OFFICE VISIT (OUTPATIENT)
Dept: VASCULAR LAB | Facility: MEDICAL CENTER | Age: 74
End: 2019-12-05
Attending: FAMILY MEDICINE
Payer: COMMERCIAL

## 2019-12-05 VITALS
DIASTOLIC BLOOD PRESSURE: 74 MMHG | BODY MASS INDEX: 35.39 KG/M2 | HEART RATE: 77 BPM | HEIGHT: 66 IN | SYSTOLIC BLOOD PRESSURE: 144 MMHG | WEIGHT: 220.2 LBS

## 2019-12-05 DIAGNOSIS — G47.33 OBSTRUCTIVE SLEEP APNEA SYNDROME: ICD-10-CM

## 2019-12-05 DIAGNOSIS — E78.1 HYPERTRIGLYCERIDEMIA: ICD-10-CM

## 2019-12-05 DIAGNOSIS — Z79.02 LONG TERM (CURRENT) USE OF ANTITHROMBOTICS/ANTIPLATELETS: ICD-10-CM

## 2019-12-05 DIAGNOSIS — E11.9 TYPE 2 DIABETES MELLITUS WITHOUT COMPLICATION, WITHOUT LONG-TERM CURRENT USE OF INSULIN (HCC): ICD-10-CM

## 2019-12-05 DIAGNOSIS — I1A.0 RESISTANT HYPERTENSION: ICD-10-CM

## 2019-12-05 DIAGNOSIS — I25.10 CORONARY ARTERY DISEASE INVOLVING NATIVE HEART WITHOUT ANGINA PECTORIS, UNSPECIFIED VESSEL OR LESION TYPE: ICD-10-CM

## 2019-12-05 DIAGNOSIS — E78.5 DYSLIPIDEMIA: ICD-10-CM

## 2019-12-05 PROCEDURE — 99212 OFFICE O/P EST SF 10 MIN: CPT | Performed by: FAMILY MEDICINE

## 2019-12-05 PROCEDURE — 99214 OFFICE O/P EST MOD 30 MIN: CPT | Performed by: FAMILY MEDICINE

## 2019-12-05 ASSESSMENT — ENCOUNTER SYMPTOMS
SEIZURES: 0
FOCAL WEAKNESS: 0
SHORTNESS OF BREATH: 0
ORTHOPNEA: 0
TREMORS: 0
INSOMNIA: 0
BLOOD IN STOOL: 0
PALPITATIONS: 0
DEPRESSION: 0
WHEEZING: 0
ABDOMINAL PAIN: 0
MYALGIAS: 0
BRUISES/BLEEDS EASILY: 0
WEAKNESS: 0
FEVER: 0
HEMOPTYSIS: 0
CHILLS: 0
DIZZINESS: 0
DOUBLE VISION: 0
VOMITING: 0
NERVOUS/ANXIOUS: 0
COUGH: 0
DIARRHEA: 0
HEADACHES: 0
NAUSEA: 0
BLURRED VISION: 0
SORE THROAT: 0

## 2019-12-05 NOTE — PROGRESS NOTES
RESISTANT HTN CLINIC - INITIAL VISIT   12/5/19    Assessment / Plan:   1. Blood Pressure Control:  Qualifies for resistant HTN due to 4 meds w/o control   ACC/AHA (2017) goal <130/80, consider less stringent goals based upon pt preference and polypharmacy potential   Home BP at goal:  yes  Office BP at goal:  No, improved, white coat effect noted   Device candidate? No due to age   Plan:   Monitoring:   - continue home BP monitoring, reviewed correct technique:  Yes   - order 24h ABPM:  Ordered today   - monitor lytes/gfr routinely   - advised that stabilizing BP may require multiple med changes and close monitoring over the next several months, reviewed that initially there will be additional imaging and labs and the possibility of adverse drug rxn's and variability of his BP and HR until we have things stabilized.  Advised to contact our office as needed for questions or concerns.    2. Work up of Secondary Causes of Resistant Hypertension:   Renovascular HTN: Excluded  Primary Aldosteronism: Not Yet Assessed  Thyroid Function: Excluded  Obstructive Sleep Apnea: using BiPAP, reports good compliance, pending with pulm MD in 2-3 weeks, minimal good nights sleep   Pheochromocytoma: Not Yet Assessed  Other: none  Recommendations At This Visit:        3. Medication Use / Adherence:  Assessment: Complete  Recommendations: Instructed to Continue Taking All Medications As Prescribed         4. End Organ Damage:   Left Ventricular Hypertrophy: Absent on  Echocardiogram Date: 2017  Albuminuria: Microalbuminuria, level A2 on Date: 10/2018  Renal Function: Chronic Kidney Disease  Stage 3a  Established Cardiovascular Disease: Present: hx of CAD, s/p CABG    5. Lifestyle Recommendations:  Smoking: continued complete avoidance of all tobacco products     Physical Activity: continue healthy activity to improve CV fitness, see care instructions for additional details     Weight Management and Nutrition: Dietary plan was discussed  with patient at this visit including DASH, low sodium and/or as outlined in care instructions     6. Standard HTN Pharmacotherapy:  ACEI/ARB: continue trandolapril 4mg QHS for true 24h coverage, consider dose reduction due to ? ED    Thiazide Type Diuretic: stop due to worsening ED  Calcium Channel Blocker (CCB): continue amlodipine 5mg BID, vasodilatory edema should be balanced by eplerenone    7. Additional Agents:  Aldosterone Antagonist: stopped spironolactone due to ED, continue eplerenone 50mg, increase to BID as next step   Loop Diuretic: none   Peripheral Alpha Blocker: add doxazosin 1-2mg QHS as next agent   Other CCB: none  Direct Vasodilator: none  Centrally Acting Alpha Agonist: prefer to avoid clonidine due to rebound HTN effects, will consider prazosin if BP spikes start occurring   Other:   BB: could consider carvedilol or nebivolol as future agents      8. Other CV Risk Factors:     Lipids:  Myalgias on rosuva and 1/2 dose pravastatin. Has failed simva, atorva, prava.    PSCK9i indicated per 2018 ACC/AHA guidelines in this patient with established ASCVD whose LDL-C remains above threshold of 70 mg/dl despite maximally tolerated statin therapy.  NLA Risk Category:   Very high:  Evidence of ASCVD or T1/T2D with 2 or more RFs or evidence of end-org damage (CKD, ACR>29, retinopathy)  Tx threshold:  non-HDL-C >99, LDL-C >69  Tx goal: non-HDL-C <100,  LDL-C <70  (optional: apoB <80)  At goal:  no  Plan:  - reinforced ongoing TLC measures   - continue zetia 10mg daily   - start praluent 150mg daily.  Reviewed injection technique and dosing schedule   - update labs 1 week after 3rd injection   - continue vitamin D3 9382-9990 daily     DM: type 2, non-insulin  Last A1c = 7.5%  Goal A1c <7.0%  ACR: abnormal: A2  Plan:  - increase metformin to 1000mg BID   - add sglt2i as next agent   - consider glp1RA weekly as 3rd agent   - recommmend for routine care with PCP (or endocrine) to include regular A1c monitoring,  annual albumin/creatinine ratio (ACR), annual diabetic retinopathy screening, foot exams, annual flu vaccine, and updates to pneumonia vaccines as appropriate     Smoking: ended year  or pack years of use 25     Antiplatelet Therapy: continue ASA 81mg daily      9. Other Issues:    - Afib - hx of paroxysmal Afib post-op CABG.    No current, has been off BB and xarelto in .   - Continue monitoring per cardiology     - hypogonadism, erectile dysfunction, stable and improved off thiazide and spironolactone.  Pt aware of adverse effects of testosterone on lipids    - CHOCO -continue f/u with sleep MD     - CAD - s/p CABG, 2013, still in high risk stage <10yrs from operation   - continue aggressive med mgmt, qualifies for pcsk9i  - ongoing care with cardiology     Studies Ordered At This Visit:  None   Blood Work to Be Obtained Prior to Next Visit:    Disposition:     Diagnosis:  1. Resistant hypertension  REFERRAL TO 24-HOUR BLOOD PRESSURE MONITORING   2. Dyslipidemia  Comp Metabolic Panel    LDL, DIRECT    Lipid Profile   3. Coronary artery disease involving native heart without angina pectoris, unspecified vessel or lesion type     4. Long term (current) use of antithrombotics/antiplatelets     5. Type 2 diabetes mellitus without complication, without long-term current use of insulin (HCC)     6. Obstructive sleep apnea syndrome     7. Hypertriglyceridemia          History of Present Illness:   Wang Forman JrJolie is a male seen for evaluation of resistant HTN and management.     Wang Forman JrJolie is referred by:cardiologist Scott Cameron,*.    HTN:  Current HTN concerns:  Reports multiple med issues but noting BP improving. Current ADRs: increased ED, nausea, increased appetite, unstable while walking  HTN sx:  No current blurred or changed vision, chest pain, shortness of breath, headache, nausea, dizziness/vertigo   Home BP lo-150s/70-80s  24h ABPM completed: not  tested  Adherence to current HTN meds: compliant all of the time     Antiplatelet/anticoagulation:   Yes, Details: ASA 81mg, no bleeding issues     Hyperlipidemia:    Stopped prava due to myalgias  Current treatment: zetia   Myalgias? no  Other adverse drug reactions? No  Lipid profile:   Lab Results   Component Value Date/Time    CHOLBELGICAOT 172 11/20/2019 07:15 AM     (H) 11/20/2019 07:15 AM    HDL 34.0 (L) 11/20/2019 07:15 AM    TRIGLYCERIDE 184 (H) 11/20/2019 07:15 AM       T2D:   No sx. No lows. Last A1c 7.5%, currently on metformin.  Highest 6.9%    CHOCO:   Still daily somnolence and reduced energy. feels poorly rested daily, currently on Bipap, seening pulm MD.    Pertinent HTN hx (reviewed at initial visit):   Age at HTN dx: >10yrs  Past HTN medications: unsure of prior   HTN crises:  No prior hospitalization or crises   Lifestyle factors:     High salt: Yes, Details: knows to keep Na low    EtOH: No  Interfering substances:     NSAIDs: No    Decongestants. No   Stimulants/drugs: No    Clinical evidence of ASCVD:     1) hx of MI/ACS:  Yes, Details: s/p CABG 2013 (Dr. Love)   2) coronary or other revascularization procedure: PCI with stenting   3) TIA/ischemic CVA: No   4) PAD (including RAKEL <0.9): No   5) documented (CAD, Renal athero, AA due to athero, carotid plaque >50% stenosis: No    Major RFs:     1) Age (Men>44, women>54):  yes   2) Fhx early CAD (<55 men, <65 women):  no   3) cigarette smoking:  Yes, Details: former, quit 1987   4) high BP >139/89 or on tx: yes   5) Low HDL-C (<40 men, <50 women):  no    Other ASCVD risk factors:     CKD:  Yes, Details: G3a/A2 at worst, denies excessive nsaids    Hypothyroidism: No    Outpatient Encounter Medications as of 9/20/2019   Medication Sig Dispense Refill   • Potassium 99 MG Tab Take  by mouth.     • cholecalciferol (VITAMIN D3) 400 UNIT Tab Take 400 Units by mouth every day.     • eplerenone (INSPRA) 25 MG Tab Take 1 Tab by mouth every morning for  360 days. 90 Tab 3   • pantoprazole (PROTONIX) 40 MG Tablet Delayed Response TAKE 1 TABLET BY MOUTH ONCE DAILY 90 Tab 3   • pravastatin (PRAVACHOL) 20 MG Tab Take 1 Tab by mouth every day for 360 days. (Patient taking differently: Take 10 mg by mouth every day.) 30 Tab 11   • amLODIPine (NORVASC) 5 MG Tab Take 1 Tab by mouth 2 Times a Day.     • trandolapril (MAVIK) 4 MG Tab Take 1 Tab by mouth every bedtime for 360 days. 90 Tab 3   • acarbose (PRECOSE) 50 MG Tab TAKE ONE TABLET BY MOUTH TWICE DAILY 180 Tab 3   • metFORMIN (GLUCOPHAGE) 500 MG Tab Take 1 Tab by mouth every day. 90 Tab 3   • aspirin 81 MG EC tablet Take  by mouth.     • doxycycline (VIBRAMYCIN) 100 MG Tab Take 1 Tab by mouth 2 times a day. 10 Tab 0   • therapeutic multivitamin-minerals (THERAGRAN-M) Tab Take 1 Tab by mouth every day.     • testosterone cypionate (DEPO-TESTOSTERONE) 200 MG/ML Solution injection 1 mL by Intramuscular route every 21 days. 1 mL 11   • cyclobenzaprine (FLEXERIL) 10 MG Tab Take 1 Tab by mouth every 8 hours as needed for Muscle Spasms. (Patient not taking: Reported on 2019) 60 Tab 0     No facility-administered encounter medications on file as of 2019.       Social History:     Social History     Socioeconomic History   • Marital status:      Spouse name: Not on file   • Number of children: 1   • Years of education: Not on file   • Highest education level: Not on file   Occupational History   • Not on file   Social Needs   • Financial resource strain: Not on file   • Food insecurity:     Worry: Not on file     Inability: Not on file   • Transportation needs:     Medical: Not on file     Non-medical: Not on file   Tobacco Use   • Smoking status: Former Smoker     Packs/day: 2.00     Years: 25.00     Pack years: 50.00     Types: Cigarettes     Last attempt to quit: 1987     Years since quittin.9   • Smokeless tobacco: Never Used   Substance and Sexual Activity   • Alcohol use: Yes     Alcohol/week:  0.0 oz     Comment: a few times a year   • Drug use: No   • Sexual activity: Not on file     Comment:    Lifestyle   • Physical activity:     Days per week: 0 days     Minutes per session: 0 min   • Stress: Not on file   Relationships   • Social connections:     Talks on phone: Not on file     Gets together: Not on file     Attends Druze service: Not on file     Active member of club or organization: Not on file     Attends meetings of clubs or organizations: Not on file     Relationship status: Not on file   • Intimate partner violence:     Fear of current or ex partner: Not on file     Emotionally abused: Not on file     Physically abused: Not on file     Forced sexual activity: Not on file   Other Topics Concern   • Not on file   Social History Narrative   • Not on file        Review of Systems:   Review of Systems   Constitutional: Negative for chills, fever and malaise/fatigue.   HENT: Negative for nosebleeds, sore throat and tinnitus.    Eyes: Negative for blurred vision and double vision.   Respiratory: Negative for cough, hemoptysis, shortness of breath and wheezing.    Cardiovascular: Negative for chest pain, palpitations, orthopnea and leg swelling.   Gastrointestinal: Negative for abdominal pain, blood in stool, diarrhea, melena, nausea and vomiting.   Genitourinary: Negative for hematuria.   Musculoskeletal: Negative for joint pain and myalgias.   Skin: Negative for itching and rash.   Neurological: Negative for dizziness, tremors, focal weakness, seizures, weakness and headaches.   Endo/Heme/Allergies: Does not bruise/bleed easily.   Psychiatric/Behavioral: Negative for depression. The patient is not nervous/anxious and does not have insomnia.         Objective:   Allergies:  Naproxen; Nsaids; Codeine; Morphine; Sulfa drugs; and Rosuvastatin calcium    Vitals:    12/05/19 1338 12/05/19 1342   BP: 152/67 144/74   BP Location: Left arm Left arm   Patient Position: Sitting Sitting   BP Cuff  "Size: Adult Adult   Pulse: 78 77   Weight: 99.9 kg (220 lb 3.2 oz)    Height: 1.676 m (5' 6\")      BP Readings from Last 5 Encounters:   12/05/19 144/74   11/22/19 130/74   09/20/19 136/69   08/23/19 137/59   08/01/19 130/63      Body mass index is 35.54 kg/m².    Physical Exam   Constitutional: He is oriented to person, place, and time. He appears well-developed and well-nourished. He is cooperative. No distress.   HENT:   Head: Normocephalic and atraumatic.   Mouth/Throat: Oropharynx is clear and moist and mucous membranes are normal.   Eyes: Pupils are equal, round, and reactive to light. Conjunctivae are normal.   Neck: Trachea normal and normal range of motion. Neck supple. Normal carotid pulses and no JVD present. Carotid bruit is not present. No thyromegaly present.   Cardiovascular: Normal rate, regular rhythm, normal heart sounds and intact distal pulses. Exam reveals no gallop and no friction rub.   No murmur heard.  Pulses:       Carotid pulses are 2+ on the right side and 2+ on the left side.       Radial pulses are 2+ on the right side and 2+ on the left side.        Dorsalis pedis pulses are 2+ on the right side and 2+ on the left side.        Posterior tibial pulses are 2+ on the right side and 2+ on the left side.   Edema:    RLE: none     LLE: none   Spider telangectasia:       RLE:  None      LLE: none   Varicosities:         RLE: none      LLE: none   Corona phlebectatica:      RLE:  None        LLE:  None   Cording:         RLE:  None     LLE: None    Pulmonary/Chest: Effort normal and breath sounds normal. No respiratory distress.   Abdominal: Soft. Bowel sounds are normal. He exhibits no distension and no mass. There is no hepatosplenomegaly. There is no tenderness. There is no rebound and no guarding.   Musculoskeletal:         General: No edema.   Lymphadenopathy:     He has no cervical adenopathy.   Neurological: He is alert and oriented to person, place, and time. No cranial nerve deficit. " Coordination and gait normal.   Skin: Skin is warm and dry. He is not diaphoretic. No cyanosis. No pallor. Nails show no clubbing.   Psychiatric: He has a normal mood and affect.        Accessory Clinical Findings:   Echocardiogram:  Results for orders placed or performed during the hospital encounter of 09/26/17   ECHOCARDIOGRAM COMP W/O CONT   Result Value Ref Range    Eject.Frac. MOD BP 57.79     Eject.Frac. MOD 4C 56.25     Eject.Frac. MOD 2C 62.39       Lab Results   Component Value Date    CHOLSTRLTOT 172 11/20/2019     (H) 11/20/2019    HDL 34.0 (L) 11/20/2019    TRIGLYCERIDE 184 (H) 11/20/2019         Lab Results   Component Value Date    HBA1C 7.5 (H) 11/20/2019      Lab Results   Component Value Date    SODIUM 137 11/20/2019    POTASSIUM 4.5 11/20/2019    CHLORIDE 102 11/20/2019    CO2 23 11/20/2019    GLUCOSE 170 (H) 11/20/2019    BUN 19 (H) 11/20/2019    CREATININE 1.3 11/20/2019      Lab Results   Component Value Date    ALDOSTERONE 28 07/01/2019      Lab Results   Component Value Date    URCREAT 242.61 07/01/2019    MICROALBUR 156.5 (H) 07/01/2019    MALBCRT 65 (H) 07/01/2019        VASCULAR IMAGING:     Echo 2017  No prior study is available for comparison.   Left ventricular ejection fraction is visually estimated to be 60%.   Normal diastolic function. Poor endocardial definition difficult to   assess wall motion.  No evidence of valvular abnormality based on Doppler evaluation.   Estimated right ventricular systolic pressure  is 25 mmHg.    Echo 7/25/19  Normal left ventricular systolic function. Left ventricular ejection   fraction is visually estimated to be 55%.   Normal diastolic function.    Renal art duplex 7/31/19  Unremarkable renal sonogram with no evidence of renal artery stenosis as demonstrated above      Javad Childs M.D.

## 2019-12-05 NOTE — PATIENT INSTRUCTIONS
1) start praluent   2) increase metformin 1000mg 2 times     Blood pressure and BP-lowering diet:  If you are being treated for blood pressure today: please be advised that stabilizing BP may require multiple med changes and close monitoring over the next several months and initially there may be additional imaging and labs and the possibility of adverse drug reactions. Variability of your blood pressure and heart rate may occur until we have things stabilized.  Please feel free to contact our office as needed for questions or concerns.      SODIUM RESTRICTIONS: - consume no more than 2,300mg of sodium daily (look at food labels) ,or if you have high BP then reduce to no more than 1,500mg of sodium daily   For more information visit: https://www.[employer]/contents/low-sodium-diet-the-basics/print?irqbjCvd=5060&source=see_link     DASH diet   (https://www.heart.org/en/health-topics/high-blood-pressure/changes-you-can-make-to-manage-high-blood-pressure/managing-blood-pressure-with-a-heart-healthy-diet)    - if you notice BP > 140/>90 for more than 3 days, then contact our office for further instructions    Cholesterol-reducing diets:   - AHA diet  (http://www.heart.org/en/healthy-living/healthy-eating/eat-smart/nutrition-basics/aha-diet-and-lifestyle-recommendations)   - Mediterranean diet (http://www.heart.org/en/healthy-living/healthy-eating/eat-smart/nutrition-basics/mediterranean-diet)   - lowering triglycerides: (http://my.americanheart.org/idc/groups/ahamah-public/@wc/@sop/@[facility]/documents/downloadable/ucm_425988.pdf)     Physical activity: Unless directed otherwise at today's visit or you are physically incapable due to your current health or medical conditions, it is advised per the American Heart Association to engage in moderate to vigorous physical activity such as brisk walking, swimming, cycling, >150 minutes per week at 30-40 minutes per session, 3 to 5 times weekly.      General healthly nutrition  advice:  - the USDA food pattern (https://www.cnpp.usda.gov/USDAFoodPatterns)  - plate method (https://www.choosemyplate.gov/)  - consume diet that emphasizes intake of vegetables, fruits, and whole grains,  - use low fat diary products, poultry, fish, legumes, nontropical vegetable oils, nuts  - limit intake of sweets, sugar-sweetned beverages, and red meats   - reduce saturated and trans fats to <6% of your daily calories

## 2019-12-11 ENCOUNTER — NON-PROVIDER VISIT (OUTPATIENT)
Dept: VASCULAR LAB | Facility: MEDICAL CENTER | Age: 74
End: 2019-12-11
Attending: INTERNAL MEDICINE
Payer: COMMERCIAL

## 2019-12-11 PROCEDURE — 93790 AMBL BP MNTR W/SW I&R: CPT | Performed by: INTERNAL MEDICINE

## 2019-12-11 PROCEDURE — 93786 AMBL BP MNTR W/SW REC ONLY: CPT

## 2019-12-11 PROCEDURE — 93788 AMBL BP MNTR W/SW A/R: CPT

## 2019-12-12 RX ORDER — EPLERENONE 50 MG/1
1 TABLET, FILM COATED ORAL 2 TIMES DAILY
Qty: 180 TAB | Refills: 3 | Status: SHIPPED | OUTPATIENT
Start: 2019-12-12 | End: 2020-03-05

## 2019-12-12 NOTE — PROGRESS NOTES
Ambulatory blood pressure monitor results reviewed  Full report under media tab  Reasonable data acquisition    Mean daytime: 146/75 consistent with poorly controlled out of office systolic blood pressure    Nocturnal dip: Appears normal    Clinical correlation needed.  We will discuss with patient at follow-up visit    Michael Bloch, MD  [facility]

## 2019-12-13 ENCOUNTER — TELEPHONE (OUTPATIENT)
Dept: VASCULAR LAB | Facility: MEDICAL CENTER | Age: 74
End: 2019-12-13

## 2019-12-13 NOTE — PROGRESS NOTES
ABPM shows uncontrolled BP.  Recommend increase eplerenone to 50mg BID.  New Rx sent, MA to contact with instructions.  Continue all other meds.

## 2019-12-14 NOTE — TELEPHONE ENCOUNTER
LM for pt about his ABPM shows uncontrolled BP levels and Dr. Childs would like for him to increase his eplerenone to 50mg 2 times daily.  Continue with current other medications and BP log at home.  Keep f/u appt in the future to discuss further.

## 2019-12-17 ENCOUNTER — TELEPHONE (OUTPATIENT)
Dept: VASCULAR LAB | Facility: MEDICAL CENTER | Age: 74
End: 2019-12-17

## 2019-12-17 NOTE — TELEPHONE ENCOUNTER
PA for Praluent filled out and [facility]. PA scanned in media. Will wait for determination

## 2019-12-19 ENCOUNTER — TELEPHONE (OUTPATIENT)
Dept: VASCULAR LAB | Facility: MEDICAL CENTER | Age: 74
End: 2019-12-19

## 2019-12-23 DIAGNOSIS — I1A.0 RESISTANT HYPERTENSION: ICD-10-CM

## 2019-12-23 DIAGNOSIS — E11.9 TYPE 2 DIABETES MELLITUS WITHOUT COMPLICATION, WITHOUT LONG-TERM CURRENT USE OF INSULIN (HCC): ICD-10-CM

## 2019-12-23 RX ORDER — METFORMIN HYDROCHLORIDE EXTENDED-RELEASE TABLETS 500 MG/1
1-2 TABLET, FILM COATED, EXTENDED RELEASE ORAL 2 TIMES DAILY
Qty: 120 TAB | Refills: 0 | Status: SHIPPED | OUTPATIENT
Start: 2019-12-23 | End: 2020-03-05

## 2019-12-23 NOTE — PROGRESS NOTES
[facility]    Pt called stating he is unable to tolerate the recent metformin dose increase from 1000 mg daily to 1000 mg BID.  He reports debilitating nausea.  Pt is open to trying ER formulation.  Sent new prescription to his pharmacy and pt will report any future intolerance.     Javad Chang, PharmD    CC  Dr Childs

## 2019-12-30 DIAGNOSIS — E78.5 DYSLIPIDEMIA: ICD-10-CM

## 2020-01-15 ENCOUNTER — TELEPHONE (OUTPATIENT)
Dept: VASCULAR LAB | Facility: MEDICAL CENTER | Age: 75
End: 2020-01-15

## 2020-01-20 ENCOUNTER — OFFICE VISIT (OUTPATIENT)
Dept: VASCULAR LAB | Facility: MEDICAL CENTER | Age: 75
End: 2020-01-20
Attending: INTERNAL MEDICINE
Payer: COMMERCIAL

## 2020-01-20 VITALS
HEART RATE: 69 BPM | DIASTOLIC BLOOD PRESSURE: 63 MMHG | SYSTOLIC BLOOD PRESSURE: 132 MMHG | BODY MASS INDEX: 34.07 KG/M2 | WEIGHT: 217.1 LBS | HEIGHT: 67 IN

## 2020-01-20 DIAGNOSIS — G47.33 OBSTRUCTIVE SLEEP APNEA SYNDROME: Chronic | ICD-10-CM

## 2020-01-20 DIAGNOSIS — E78.5 DYSLIPIDEMIA: Chronic | ICD-10-CM

## 2020-01-20 DIAGNOSIS — E11.9 DIET-CONTROLLED TYPE 2 DIABETES MELLITUS (HCC): ICD-10-CM

## 2020-01-20 DIAGNOSIS — I10 ESSENTIAL HYPERTENSION: ICD-10-CM

## 2020-01-20 PROCEDURE — 99212 OFFICE O/P EST SF 10 MIN: CPT | Performed by: NURSE PRACTITIONER

## 2020-01-20 PROCEDURE — 99214 OFFICE O/P EST MOD 30 MIN: CPT | Performed by: NURSE PRACTITIONER

## 2020-01-20 RX ORDER — DOXAZOSIN 2 MG/1
2 TABLET ORAL DAILY
Qty: 30 TAB | Refills: 4 | Status: SHIPPED | OUTPATIENT
Start: 2020-01-20 | End: 2020-03-05

## 2020-01-20 ASSESSMENT — ENCOUNTER SYMPTOMS
WEAKNESS: 0
DIZZINESS: 1
WHEEZING: 0
SHORTNESS OF BREATH: 0
NERVOUS/ANXIOUS: 0
DOUBLE VISION: 0
DIARRHEA: 0
FEVER: 0
COUGH: 0
FOCAL WEAKNESS: 0
BLOOD IN STOOL: 0
ORTHOPNEA: 0
HEADACHES: 0
PALPITATIONS: 0
HEMOPTYSIS: 0
NAUSEA: 1
TREMORS: 0
SORE THROAT: 0
DEPRESSION: 0
VOMITING: 0
CHILLS: 0
INSOMNIA: 0
SEIZURES: 0
BLURRED VISION: 0
BRUISES/BLEEDS EASILY: 0
ABDOMINAL PAIN: 0
MYALGIAS: 0

## 2020-01-20 NOTE — PROGRESS NOTES
[facility] - INITIAL VISIT   1/20/20    Assessment / Plan:   1. Blood Pressure Control:  Qualifies for resistant HTN due to 4 meds w/o control   ACC/AHA (2017) goal <130/80, consider less stringent goals based upon pt preference and polypharmacy potential   Home BP at goal:  yes  Office BP at goal:  No, improved, white coat effect noted   Device candidate? No due to age   Plan:   Monitoring:   - continue home BP monitoring, reviewed correct technique:  Yes   - order 24h ABPM:  Ordered today   - monitor lytes/gfr routinely   - advised that stabilizing BP may require multiple med changes and close monitoring over the next several months, reviewed that initially there will be additional imaging and labs and the possibility of adverse drug rxn's and variability of his BP and HR until we have things stabilized.  Advised to contact our office as needed for questions or concerns.    2. Work up of Secondary Causes of Resistant Hypertension:   Renovascular HTN: Excluded  Primary Aldosteronism: Not Yet Assessed  Thyroid Function: Excluded  Obstructive Sleep Apnea: using BiPAP, reports good compliance, pending with pulm MD in 2-3 weeks, minimal good nights sleep   Pheochromocytoma: Not Yet Assessed  Other: none  Recommendations At This Visit:        3. Medication Use / Adherence:  Assessment: Complete  Recommendations: Instructed to Continue Taking All Medications As Prescribed         4. End Organ Damage:   Left Ventricular Hypertrophy: Absent on  Echocardiogram Date: 2017  Albuminuria: Microalbuminuria, level A2 on Date: 10/2018  Renal Function: Chronic Kidney Disease  Stage 3a  Established Cardiovascular Disease: Present: hx of CAD, s/p CABG    5. Lifestyle Recommendations:  Smoking: continued complete avoidance of all tobacco products     Physical Activity: continue healthy activity to improve CV fitness, see care instructions for additional details     Weight Management and Nutrition: Dietary plan was discussed  with patient at this visit including DASH, low sodium and/or as outlined in care instructions     6. Standard HTN Pharmacotherapy:  ACEI/ARB: continue trandolapril 4mg QHS for true 24h coverage, consider dose reduction due to ? ED    Thiazide Type Diuretic: stop due to worsening ED  Calcium Channel Blocker (CCB): continue amlodipine 5mg BID, vasodilatory edema should be balanced by eplerenone    7. Additional Agents:  Aldosterone Antagonist: stopped spironolactone due to ED, continue eplerenone 50 mg for now do not increase due to perceived SE.   Loop Diuretic: none   Peripheral Alpha Blocker: Trial of add doxazosin 2mg QHS  Other CCB: none  Direct Vasodilator: none  Centrally Acting Alpha Agonist: prefer to avoid clonidine due to rebound HTN effects, will consider prazosin if BP spikes start occurring   Other:   BB: could consider carvedilol or nebivolol as future agents      8. Other CV Risk Factors:     Lipids:  Myalgias on rosuva and 1/2 dose pravastatin. Has failed simva, atorva, prava.    PSCK9i indicated per 2018 ACC/AHA guidelines in this patient with established ASCVD whose LDL-C remains above threshold of 70 mg/dl despite maximally tolerated statin therapy.  NLA Risk Category:   Very high:  Evidence of ASCVD or T1/T2D with 2 or more RFs or evidence of end-org damage (CKD, ACR>29, retinopathy)  Tx threshold:  non-HDL-C >99, LDL-C >69  Tx goal: non-HDL-C <100,  LDL-C <70  (optional: apoB <80)  At goal:  no  Plan:  - reinforced ongoing TLC measures   - continue zetia 10mg daily   - start praluent 150mg daily.  Reviewed injection technique and dosing schedule   - update labs 1 week after 3rd injection  Per last orders  - continue vitamin D3 2266-4609 daily     DM: type 2, non-insulin  Last A1c = 7.5%will test again before next visit  Goal A1c <7.0%  ACR: abnormal: A2  Plan:  - Decrease  metformin to 500 mg pm due to SE unable to start ER form.     - Trial of Invokana 100 mg    - consider glp1RA weekly as 3rd  agent   - recommmend for routine care with PCP (or endocrine) to include regular A1c monitoring, annual albumin/creatinine ratio (ACR), annual diabetic retinopathy screening, foot exams, annual flu vaccine, and updates to pneumonia vaccines as appropriate     Smoking: ended year  or pack years of use 25     Antiplatelet Therapy: continue ASA 81mg daily      9. Other Issues:    - Afib - hx of paroxysmal Afib post-op CABG.    No current, has been off BB and xarelto in .   - Continue monitoring per cardiology     - hypogonadism, erectile dysfunction, stable and improved off thiazide and spironolactone.  Pt aware of adverse effects of testosterone on lipids    - CHOCO -continue f/u with sleep MD     - CAD - s/p CABG, 2013, still in high risk stage <10yrs from operation   - continue aggressive med mgmt, qualifies for pcsk9i  - ongoing care with cardiology     Studies Ordered At This Visit:  None   Blood Work to Be Obtained Prior to Next Visit:    Disposition:     Diagnosis:  1. Type 2 diabetes mellitus without complications, without long-term current use of insulin     2. Dyslipidemia     3. Essential hypertension     4. Obstructive sleep apnea syndrome          History of Present Illness:   Wang Shermanriri Summers is a male seen for evaluation of resistant HTN and management.     Wang Shermanriri Summers is referred by:cardiologist Scott Cameron,*.    HTN:  Current HTN concerns:  Reports nausea and dizziness daily, BP still elevated. Worse side effects since increasing eplerenone.  Dose Current ADRs: increased ED, nausea, increased appetite, unstable while walking  HTN sx:  No current blurred or changed vision, chest pain, shortness of breath, headache, nausea, dizziness/vertigo   Home BP lo-150/70-80's  24h ABPM completed: not tested  Adherence to current HTN meds: compliant all of the time     Antiplatelet/anticoagulation:   Yes, Details: ASA 81mg, no bleeding issues     Hyperlipidemia:     Stopped prava due to myalgias  Current treatment: zetia   Myalgias? no  Other adverse drug reactions? No  Lipid profile:   Lab Results   Component Value Date/Time    CHOLSTRLTOT 172 11/20/2019 07:15 AM     (H) 11/20/2019 07:15 AM    HDL 34.0 (L) 11/20/2019 07:15 AM    TRIGLYCERIDE 184 (H) 11/20/2019 07:15 AM       T2D:   No sx. No lows. Last A1c 7.5%, currently on metformin but 1 tab once a day. Was unable to do the er formulary. Does not seem to be greg Metformin more that once daily. GI symptoms. Highest prior 6.9%    CHOCO:   Still daily somnolence and reduced energy. feels poorly rested daily, currently on Bipap, seening pulm MD.    Pertinent HTN hx (reviewed at initial visit):   Age at HTN dx: >10yrs  Past HTN medications: unsure of prior   HTN crises:  No prior hospitalization or crises   Lifestyle factors:     High salt: Yes, Details: knows to keep Na low    EtOH: No  Interfering substances:     NSAIDs: No    Decongestants. No   Stimulants/drugs: No    Clinical evidence of ASCVD:     1) hx of MI/ACS:  Yes, Details: s/p CABG 2013 (Dr. Love)   2) coronary or other revascularization procedure: PCI with stenting   3) TIA/ischemic CVA: No   4) PAD (including RAKEL <0.9): No   5) documented (CAD, Renal athero, AA due to athero, carotid plaque >50% stenosis: No    Major RFs:     1) Age (Men>44, women>54):  yes   2) Fhx early CAD (<55 men, <65 women):  no   3) cigarette smoking:  Yes, Details: former, quit 1987   4) high BP >139/89 or on tx: yes   5) Low HDL-C (<40 men, <50 women):  no    Other ASCVD risk factors:     CKD:  Yes, Details: G3a/A2 at worst, denies excessive nsaids    Hypothyroidism: No    Outpatient Encounter Medications as of 9/20/2019   Medication Sig Dispense Refill   • Potassium 99 MG Tab Take  by mouth.     • cholecalciferol (VITAMIN D3) 400 UNIT Tab Take 400 Units by mouth every day.     • eplerenone (INSPRA) 25 MG Tab Take 1 Tab by mouth every morning for 360 days. 90 Tab 3   •  pantoprazole (PROTONIX) 40 MG Tablet Delayed Response TAKE 1 TABLET BY MOUTH ONCE DAILY 90 Tab 3   • pravastatin (PRAVACHOL) 20 MG Tab Take 1 Tab by mouth every day for 360 days. (Patient taking differently: Take 10 mg by mouth every day.) 30 Tab 11   • amLODIPine (NORVASC) 5 MG Tab Take 1 Tab by mouth 2 Times a Day.     • trandolapril (MAVIK) 4 MG Tab Take 1 Tab by mouth every bedtime for 360 days. 90 Tab 3   • acarbose (PRECOSE) 50 MG Tab TAKE ONE TABLET BY MOUTH TWICE DAILY 180 Tab 3   • metFORMIN (GLUCOPHAGE) 500 MG Tab Take 1 Tab by mouth every day. 90 Tab 3   • aspirin 81 MG EC tablet Take  by mouth.     • doxycycline (VIBRAMYCIN) 100 MG Tab Take 1 Tab by mouth 2 times a day. 10 Tab 0   • therapeutic multivitamin-minerals (THERAGRAN-M) Tab Take 1 Tab by mouth every day.     • testosterone cypionate (DEPO-TESTOSTERONE) 200 MG/ML Solution injection 1 mL by Intramuscular route every 21 days. 1 mL 11   • cyclobenzaprine (FLEXERIL) 10 MG Tab Take 1 Tab by mouth every 8 hours as needed for Muscle Spasms. (Patient not taking: Reported on 2019) 60 Tab 0     No facility-administered encounter medications on file as of 2019.       Social History:     Social History     Socioeconomic History   • Marital status:      Spouse name: Not on file   • Number of children: 1   • Years of education: Not on file   • Highest education level: Not on file   Occupational History   • Not on file   Social Needs   • Financial resource strain: Not on file   • Food insecurity:     Worry: Not on file     Inability: Not on file   • Transportation needs:     Medical: Not on file     Non-medical: Not on file   Tobacco Use   • Smoking status: Former Smoker     Packs/day: 2.00     Years: 25.00     Pack years: 50.00     Types: Cigarettes     Last attempt to quit: 1987     Years since quittin.0   • Smokeless tobacco: Never Used   Substance and Sexual Activity   • Alcohol use: Yes     Alcohol/week: 0.0 oz     Comment: a few  times a year   • Drug use: No   • Sexual activity: Not on file     Comment:    Lifestyle   • Physical activity:     Days per week: 0 days     Minutes per session: 0 min   • Stress: Not on file   Relationships   • Social connections:     Talks on phone: Not on file     Gets together: Not on file     Attends Zoroastrian service: Not on file     Active member of club or organization: Not on file     Attends meetings of clubs or organizations: Not on file     Relationship status: Not on file   • Intimate partner violence:     Fear of current or ex partner: Not on file     Emotionally abused: Not on file     Physically abused: Not on file     Forced sexual activity: Not on file   Other Topics Concern   • Not on file   Social History Narrative   • Not on file        Review of Systems:   Review of Systems   Constitutional: Positive for malaise/fatigue. Negative for chills and fever.   HENT: Negative for nosebleeds, sore throat and tinnitus.    Eyes: Negative for blurred vision and double vision.   Respiratory: Negative for cough, hemoptysis, shortness of breath and wheezing.    Cardiovascular: Negative for chest pain, palpitations, orthopnea and leg swelling.   Gastrointestinal: Positive for nausea. Negative for abdominal pain, blood in stool, diarrhea, melena and vomiting.   Genitourinary: Negative for hematuria.   Musculoskeletal: Negative for joint pain and myalgias.   Skin: Negative for itching and rash.   Neurological: Positive for dizziness. Negative for tremors, focal weakness, seizures, weakness and headaches.   Endo/Heme/Allergies: Does not bruise/bleed easily.   Psychiatric/Behavioral: Negative for depression. The patient is not nervous/anxious and does not have insomnia.         Objective:   Allergies:  Naproxen; Nsaids; Codeine; Morphine; Sulfa drugs; and Rosuvastatin calcium    Vitals:    01/20/20 1046 01/20/20 1053   BP: 133/62 132/63   BP Location: Left arm Left arm   Patient Position: Sitting Sitting  "  BP Cuff Size: Adult Adult   Pulse: 72 69   Weight: 98.5 kg (217 lb 1.6 oz)    Height: 1.689 m (5' 6.5\")      BP Readings from Last 5 Encounters:   01/20/20 132/63   12/05/19 144/74   11/22/19 130/74   09/20/19 136/69   08/23/19 137/59      Body mass index is 34.52 kg/m².    Physical Exam   Constitutional: He is oriented to person, place, and time. He appears well-developed and well-nourished. No distress.   Neck: No JVD present.   Cardiovascular: Normal rate, regular rhythm, normal heart sounds and intact distal pulses. Exam reveals no gallop and no friction rub.   No murmur heard.  Pulmonary/Chest: Effort normal and breath sounds normal. No respiratory distress. He has no wheezes. He has no rales.   Musculoskeletal:         General: No tenderness or edema.   Neurological: He is alert and oriented to person, place, and time. Coordination normal.   Skin: Skin is warm and dry. No rash noted. He is not diaphoretic. No erythema. No pallor.   Psychiatric: He has a normal mood and affect. His behavior is normal.        Accessory Clinical Findings:   Echocardiogram:  Results for orders placed or performed during the hospital encounter of 09/26/17   ECHOCARDIOGRAM COMP W/O CONT   Result Value Ref Range    Eject.Frac. MOD BP 57.79     Eject.Frac. MOD 4C 56.25     Eject.Frac. MOD 2C 62.39       Lab Results   Component Value Date    CHOLSTRLTOT 172 11/20/2019     (H) 11/20/2019    HDL 34.0 (L) 11/20/2019    TRIGLYCERIDE 184 (H) 11/20/2019         Lab Results   Component Value Date    HBA1C 7.5 (H) 11/20/2019      Lab Results   Component Value Date    SODIUM 137 11/20/2019    POTASSIUM 4.5 11/20/2019    CHLORIDE 102 11/20/2019    CO2 23 11/20/2019    GLUCOSE 170 (H) 11/20/2019    BUN 19 (H) 11/20/2019    CREATININE 1.3 11/20/2019      Lab Results   Component Value Date    ALDOSTERONE 28 07/01/2019      Lab Results   Component Value Date    URCREAT 242.61 07/01/2019    MICROALBUR 156.5 (H) 07/01/2019    MALBCRT 65 (H) " 07/01/2019        VASCULAR IMAGING:     Echo 2017  No prior study is available for comparison.   Left ventricular ejection fraction is visually estimated to be 60%.   Normal diastolic function. Poor endocardial definition difficult to   assess wall motion.  No evidence of valvular abnormality based on Doppler evaluation.   Estimated right ventricular systolic pressure  is 25 mmHg.    Echo 7/25/19  Normal left ventricular systolic function. Left ventricular ejection   fraction is visually estimated to be 55%.   Normal diastolic function.    Renal art duplex 7/31/19  Unremarkable renal sonogram with no evidence of renal artery stenosis as demonstrated above      MUNDO Young.

## 2020-03-05 ENCOUNTER — OFFICE VISIT (OUTPATIENT)
Dept: VASCULAR LAB | Facility: MEDICAL CENTER | Age: 75
End: 2020-03-05
Attending: FAMILY MEDICINE
Payer: COMMERCIAL

## 2020-03-05 VITALS
HEIGHT: 66 IN | WEIGHT: 215.9 LBS | BODY MASS INDEX: 34.7 KG/M2 | SYSTOLIC BLOOD PRESSURE: 127 MMHG | HEART RATE: 60 BPM | DIASTOLIC BLOOD PRESSURE: 59 MMHG

## 2020-03-05 DIAGNOSIS — N52.9 ERECTILE DYSFUNCTION, UNSPECIFIED ERECTILE DYSFUNCTION TYPE: ICD-10-CM

## 2020-03-05 DIAGNOSIS — I1A.0 RESISTANT HYPERTENSION: ICD-10-CM

## 2020-03-05 DIAGNOSIS — E78.1 HYPERTRIGLYCERIDEMIA: ICD-10-CM

## 2020-03-05 DIAGNOSIS — Z79.02 LONG TERM (CURRENT) USE OF ANTITHROMBOTICS/ANTIPLATELETS: ICD-10-CM

## 2020-03-05 DIAGNOSIS — I10 ESSENTIAL HYPERTENSION: ICD-10-CM

## 2020-03-05 DIAGNOSIS — E11.9 TYPE 2 DIABETES MELLITUS WITHOUT COMPLICATION, WITHOUT LONG-TERM CURRENT USE OF INSULIN (HCC): ICD-10-CM

## 2020-03-05 DIAGNOSIS — E78.5 DYSLIPIDEMIA: ICD-10-CM

## 2020-03-05 DIAGNOSIS — E11.9 DIET-CONTROLLED TYPE 2 DIABETES MELLITUS (HCC): ICD-10-CM

## 2020-03-05 DIAGNOSIS — I25.10 CORONARY ARTERY DISEASE INVOLVING NATIVE HEART WITHOUT ANGINA PECTORIS, UNSPECIFIED VESSEL OR LESION TYPE: ICD-10-CM

## 2020-03-05 PROCEDURE — 99214 OFFICE O/P EST MOD 30 MIN: CPT | Performed by: FAMILY MEDICINE

## 2020-03-05 PROCEDURE — 99212 OFFICE O/P EST SF 10 MIN: CPT | Performed by: FAMILY MEDICINE

## 2020-03-05 RX ORDER — METFORMIN HYDROCHLORIDE EXTENDED-RELEASE TABLETS 500 MG/1
1 TABLET, FILM COATED, EXTENDED RELEASE ORAL DAILY
Qty: 90 TAB | Refills: 3 | Status: SHIPPED | DISCHARGE
Start: 2020-03-05 | End: 2021-02-28

## 2020-03-05 RX ORDER — AMLODIPINE BESYLATE 10 MG/1
10 TABLET ORAL DAILY
Qty: 90 TAB | Refills: 3 | Status: SHIPPED | OUTPATIENT
Start: 2020-03-05 | End: 2021-05-12

## 2020-03-05 RX ORDER — EPLERENONE 50 MG/1
1 TABLET, FILM COATED ORAL 2 TIMES DAILY
Qty: 180 TAB | Refills: 3 | Status: SHIPPED | DISCHARGE
Start: 2020-03-05 | End: 2021-01-04

## 2020-03-05 RX ORDER — DOXAZOSIN 2 MG/1
1 TABLET ORAL
Qty: 45 TAB | Refills: 3 | Status: SHIPPED | OUTPATIENT
Start: 2020-03-05 | End: 2021-04-28

## 2020-03-05 ASSESSMENT — ENCOUNTER SYMPTOMS
ABDOMINAL PAIN: 0
MYALGIAS: 0
HEMOPTYSIS: 0
TREMORS: 0
HEADACHES: 0
ORTHOPNEA: 0
SEIZURES: 0
DIZZINESS: 1
DEPRESSION: 0
BLURRED VISION: 0
CHILLS: 0
BLOOD IN STOOL: 0
PALPITATIONS: 0
WHEEZING: 0
FEVER: 0
VOMITING: 0
NAUSEA: 1
SORE THROAT: 0
FOCAL WEAKNESS: 0
SHORTNESS OF BREATH: 0
DOUBLE VISION: 0
WEAKNESS: 0
COUGH: 0
DIARRHEA: 0
INSOMNIA: 0
NERVOUS/ANXIOUS: 0
BRUISES/BLEEDS EASILY: 0

## 2020-03-05 ASSESSMENT — FIBROSIS 4 INDEX: FIB4 SCORE: 1.74

## 2020-03-05 NOTE — PROGRESS NOTES
RESISTANT HTN CLINIC - INITIAL VISIT   3/5/20    Assessment / Plan:   1. Blood Pressure Control:  Qualifies for resistant HTN due to 4 meds w/o control   ACC/AHA (2017) goal <130/80, consider less stringent goals based upon pt preference and polypharmacy potential   Home BP at goal:  yes  Office BP at goal:  Yes, white coat effect noted   24h ABPM (12/2019): Mean daytime: 146/75 consistent with poorly controlled out of office systolic blood pressure. Nocturnal dip: Appears normal  Device candidate? No    Plan:   Monitoring:   - continue home BP monitoring, reviewed correct technique:  Yes   - order 24h ABPM:  Ordered today   - monitor lytes/gfr routinely     2. Work up of Secondary Causes of Resistant Hypertension:   Renovascular HTN: Excluded  Primary Aldosteronism: Excluded, high renin  Thyroid Function: Excluded  Obstructive Sleep Apnea: using BiPAP, reports good compliance, pending with pulm MD in 2-3 weeks, minimal good nights sleep   Pheochromocytoma: Not Yet Assessed  Other: none  Recommendations At This Visit:        3. Medication Use / Adherence:  Assessment: Complete  Recommendations: Instructed to Continue Taking All Medications As Prescribed         4. End Organ Damage:   Left Ventricular Hypertrophy: Absent on  Echocardiogram Date: 2017  Albuminuria: Microalbuminuria, level A2 on Date: 10/2018  Renal Function: Chronic Kidney Disease  Stage 3a   - avoid nephrotoxins  - continue HTN control with RAAS blockade   - monitor lytes/gfr routinely  - eval with nephrology if worsening over time    Established Cardiovascular Disease: Present: hx of CAD, s/p CABG - see below     5. Lifestyle Recommendations:  Smoking: continued complete avoidance of all tobacco products     Physical Activity: continue healthy activity to improve CV fitness, see care instructions for additional details     Weight Management and Nutrition: Dietary plan was discussed with patient at this visit including DASH, low sodium and/or as  outlined in care instructions     6. Standard HTN Pharmacotherapy:  ACEI/ARB: continue trandolapril 4mg QHS for true 24h coverage, consider dose reduction due to ? ED    Thiazide Type Diuretic: stop due to worsening ED  Calcium Channel Blocker (CCB): continue amlodipine 5mg BID, vasodilatory edema should be balanced by eplerenone    7. Additional Agents:  Aldosterone Antagonist: eplerenone 50mg once daily   Loop Diuretic: none   Peripheral Alpha Blocker: reduce doxazosin 2mg to 1/2 tab (1mg) at bedtime   Other CCB: none  Direct Vasodilator: none  Centrally Acting Alpha Agonist: prefer to avoid clonidine due to rebound HTN effects, will consider prazosin if BP spikes start occurring   Other:   BB: could consider carvedilol or nebivolol as future agents      8. Other CV Risk Factors:     Lipids:  Myalgias on rosuva and 1/2 dose pravastatin. Has failed simva, atorva, prava.    PSCK9i indicated per 2018 ACC/AHA guidelines in this patient with established ASCVD whose LDL-C remains above threshold of 70 mg/dl despite maximally tolerated statin therapy.  Praluent tolerated, PA approved for 2020.   NLA Risk Category:   Very high:  Evidence of ASCVD or T1/T2D with 2 or more RFs or evidence of end-org damage (CKD, ACR>29, retinopathy)  Tx threshold:  non-HDL-C >99, LDL-C >69  Tx goal: non-HDL-C <100,  LDL-C <70  (optional: apoB <80)  At goal:  yes  Plan:  - reinforced ongoing TLC measures   - continue zetia 10mg daily   - continue praluent 150mg daily.    - update labs 1 week after 3rd injection  Per last orders  - continue vitamin D3 8354-6127 daily     DM: type 2, non-insulin  Last A1c = 6.0  Goal A1c <7.0%  ACR: abnormal: A2  Plan:  - continue metformin 500 mg  Qpm due to SE unable to start ER form.     - stop Invokana 100 mg for now due to expense, consider restart if A1c elevates switch to Jardiance or Farxiga if cost is better - pt to call us   - consider glp1RA weekly as 3rd agent   - recommmend for routine care with  PCP (or endocrine) to include regular A1c monitoring, annual albumin/creatinine ratio (ACR), annual diabetic retinopathy screening, foot exams, annual flu vaccine, and updates to pneumonia vaccines as appropriate     Smoking: ended year 1987 or pack years of use 25     Antiplatelet Therapy: continue ASA 81mg daily      9. Other Issues:    - Afib - hx of paroxysmal Afib post-op CABG.    No current, has been off BB and xarelto in 2013.   - Continue monitoring per cardiology     - hypogonadism, erectile dysfunction, stable and improved off thiazide and spironolactone.  Pt aware of adverse effects of testosterone on lipids    - CHOCO -continue f/u with sleep MD     - CAD - s/p CABG, 12/2013, still in high risk stage <10yrs from operation   - continue aggressive med mgmt, qualifies for pcsk9i  - ongoing care with cardiology     Studies Ordered At This Visit:  None   Blood Work to Be Obtained Prior to Next Visit:    Disposition:     Diagnosis:  1. Resistant hypertension  Comp Metabolic Panel    MICROALBUMIN CREAT RATIO URINE    HEMOGLOBIN A1C    LDL, DIRECT    Lipid Profile   2. Type 2 diabetes mellitus without complications, without long-term current use of insulin  MICROALBUMIN CREAT RATIO URINE    HEMOGLOBIN A1C   3. Dyslipidemia  LDL, DIRECT    Lipid Profile   4. Hypertriglyceridemia  LDL, DIRECT   5. Long term (current) use of antithrombotics/antiplatelets     6. Coronary artery disease involving native heart without angina pectoris, unspecified vessel or lesion type     7. Erectile dysfunction, unspecified erectile dysfunction type     8. Essential hypertension  doxazosin (CARDURA) 2 MG Tab   9. Type 2 diabetes mellitus without complication, without long-term current use of insulin (HCC)  Metformin HCl 500 MG TABLET SR 24 HR        History of Present Illness:   Wang Forman Jr. is a male seen for evaluation of resistant HTN and management.   Wang Forman Jr. is referred by:cardiologist Rakesh  Scott FIELDS,*.    HTN:  Current HTN concerns:  Reports intermittent dizziness with positional changes.  Has been ongoing and feels it is worsening.    Dose Current ADRs: as noted   Home BP lo-130s/70-80s   24h ABPM completed: not tested  Adherence to current HTN meds: compliant all of the time     Antiplatelet/anticoagulation:   Yes, Details: ASA 81mg, no bleeding issues     Hyperlipidemia:    Stopped prava due to myalgias  Current treatment: zetia   Myalgias? no  Other adverse drug reactions? No  Lipid profile:   Lab Results   Component Value Date/Time    CHOLSTRLTOT 74 (L) 2020 07:00 AM    LDL 7 2020 07:00 AM    HDL 32.0 (L) 2020 07:00 AM    TRIGLYCERIDE 176 (H) 2020 07:00 AM       T2D:   No sx. No lows. Last A1c 6.0  currently on metformin but 1 tab once a day and invokana 100mg.  Cost of invokana is $500/90 day  Does not seem to be greg Metformin more that once daily. GI symptoms. Highest prior 6.9%    CHOCO:   Still daily somnolence and reduced energy. feels poorly rested daily, currently on Bipap, seening pulm MD.    Pertinent HTN hx (reviewed at initial visit):   Age at HTN dx: >10yrs  Past HTN medications: unsure of prior   HTN crises:  No prior hospitalization or crises   Lifestyle factors:     High salt: Yes, Details: knows to keep Na low    EtOH: No  Interfering substances:     NSAIDs: No    Decongestants. No   Stimulants/drugs: No    Clinical evidence of ASCVD:     1) hx of MI/ACS:  Yes, Details: s/p CABG  (Dr. Love)   2) coronary or other revascularization procedure: PCI with stenting   3) TIA/ischemic CVA: No   4) PAD (including RAKEL <0.9): No   5) documented (CAD, Renal athero, AA due to athero, carotid plaque >50% stenosis: No    Major RFs:     1) Age (Men>44, women>54):  yes   2) Fhx early CAD (<55 men, <65 women):  no   3) cigarette smoking:  Yes, Details: former, quit    4) high BP >139/89 or on tx: yes   5) Low HDL-C (<40 men, <50 women):  no    Other ASCVD  risk factors:     CKD:  Yes, Details: G3a/A2 at worst, denies excessive nsaids    Hypothyroidism: No    Outpatient Encounter Medications as of 9/20/2019   Medication Sig Dispense Refill   • Potassium 99 MG Tab Take  by mouth.     • cholecalciferol (VITAMIN D3) 400 UNIT Tab Take 400 Units by mouth every day.     • eplerenone (INSPRA) 25 MG Tab Take 1 Tab by mouth every morning for 360 days. 90 Tab 3   • pantoprazole (PROTONIX) 40 MG Tablet Delayed Response TAKE 1 TABLET BY MOUTH ONCE DAILY 90 Tab 3   • pravastatin (PRAVACHOL) 20 MG Tab Take 1 Tab by mouth every day for 360 days. (Patient taking differently: Take 10 mg by mouth every day.) 30 Tab 11   • amLODIPine (NORVASC) 5 MG Tab Take 1 Tab by mouth 2 Times a Day.     • trandolapril (MAVIK) 4 MG Tab Take 1 Tab by mouth every bedtime for 360 days. 90 Tab 3   • acarbose (PRECOSE) 50 MG Tab TAKE ONE TABLET BY MOUTH TWICE DAILY 180 Tab 3   • metFORMIN (GLUCOPHAGE) 500 MG Tab Take 1 Tab by mouth every day. 90 Tab 3   • aspirin 81 MG EC tablet Take  by mouth.     • doxycycline (VIBRAMYCIN) 100 MG Tab Take 1 Tab by mouth 2 times a day. 10 Tab 0   • therapeutic multivitamin-minerals (THERAGRAN-M) Tab Take 1 Tab by mouth every day.     • testosterone cypionate (DEPO-TESTOSTERONE) 200 MG/ML Solution injection 1 mL by Intramuscular route every 21 days. 1 mL 11   • cyclobenzaprine (FLEXERIL) 10 MG Tab Take 1 Tab by mouth every 8 hours as needed for Muscle Spasms. (Patient not taking: Reported on 9/20/2019) 60 Tab 0     No facility-administered encounter medications on file as of 9/20/2019.       Social History:     Social History     Socioeconomic History   • Marital status:      Spouse name: Not on file   • Number of children: 1   • Years of education: Not on file   • Highest education level: Not on file   Occupational History   • Not on file   Social Needs   • Financial resource strain: Not on file   • Food insecurity     Worry: Not on file     Inability: Not on file    • Transportation needs     Medical: Not on file     Non-medical: Not on file   Tobacco Use   • Smoking status: Former Smoker     Packs/day: 2.00     Years: 25.00     Pack years: 50.00     Types: Cigarettes     Last attempt to quit: 1987     Years since quittin.2   • Smokeless tobacco: Never Used   Substance and Sexual Activity   • Alcohol use: Yes     Alcohol/week: 0.0 oz     Comment: a few times a year   • Drug use: No   • Sexual activity: Not on file     Comment:    Lifestyle   • Physical activity     Days per week: 0 days     Minutes per session: 0 min   • Stress: Not on file   Relationships   • Social connections     Talks on phone: Not on file     Gets together: Not on file     Attends Holiness service: Not on file     Active member of club or organization: Not on file     Attends meetings of clubs or organizations: Not on file     Relationship status: Not on file   • Intimate partner violence     Fear of current or ex partner: Not on file     Emotionally abused: Not on file     Physically abused: Not on file     Forced sexual activity: Not on file   Other Topics Concern   • Not on file   Social History Narrative   • Not on file        Review of Systems:   Review of Systems   Constitutional: Positive for malaise/fatigue. Negative for chills and fever.   HENT: Negative for nosebleeds, sore throat and tinnitus.    Eyes: Negative for blurred vision and double vision.   Respiratory: Negative for cough, hemoptysis, shortness of breath and wheezing.    Cardiovascular: Negative for chest pain, palpitations, orthopnea and leg swelling.   Gastrointestinal: Positive for nausea. Negative for abdominal pain, blood in stool, diarrhea, melena and vomiting.   Genitourinary: Negative for hematuria.   Musculoskeletal: Negative for joint pain and myalgias.   Skin: Negative for itching and rash.   Neurological: Positive for dizziness. Negative for tremors, focal weakness, seizures, weakness and headaches.  "  Endo/Heme/Allergies: Does not bruise/bleed easily.   Psychiatric/Behavioral: Negative for depression. The patient is not nervous/anxious and does not have insomnia.         Objective:   Allergies:  Naproxen; Nsaids; Codeine; Morphine; Sulfa drugs; and Rosuvastatin calcium    Vitals:    03/05/20 1005 03/05/20 1013   BP: 135/63 127/59   BP Location: Left arm Left arm   Patient Position: Sitting Sitting   BP Cuff Size: Adult Adult   Pulse: 62 60   Weight: 97.9 kg (215 lb 14.4 oz)    Height: 1.676 m (5' 6\")      BP Readings from Last 5 Encounters:   03/05/20 127/59   02/28/20 124/62   01/20/20 132/63   12/05/19 144/74   11/22/19 130/74      Body mass index is 34.85 kg/m².    Physical Exam   Constitutional: He is oriented to person, place, and time. He appears well-developed and well-nourished. No distress.   Neck: No JVD present.   Cardiovascular: Normal rate, regular rhythm, normal heart sounds and intact distal pulses. Exam reveals no gallop and no friction rub.   No murmur heard.  Pulmonary/Chest: Effort normal and breath sounds normal. No respiratory distress. He has no wheezes. He has no rales.   Musculoskeletal:         General: No tenderness or edema.   Neurological: He is alert and oriented to person, place, and time. Coordination normal.   Skin: Skin is warm and dry. No rash noted. He is not diaphoretic. No erythema. No pallor.   Psychiatric: He has a normal mood and affect. His behavior is normal.        Accessory Clinical Findings:   Echocardiogram:  Results for orders placed or performed during the hospital encounter of 09/26/17   ECHOCARDIOGRAM COMP W/O CONT   Result Value Ref Range    Eject.Frac. MOD BP 57.79     Eject.Frac. MOD 4C 56.25     Eject.Frac. MOD 2C 62.39       Lab Results   Component Value Date    CHOLSTRLTOT 74 (L) 02/25/2020    LDL 7 02/25/2020    HDL 32.0 (L) 02/25/2020    TRIGLYCERIDE 176 (H) 02/25/2020         Lab Results   Component Value Date    HBA1C 6.0 (A) 02/28/2020      Lab " Results   Component Value Date    SODIUM 141 02/25/2020    POTASSIUM 4.3 02/25/2020    CHLORIDE 105 02/25/2020    CO2 26 02/25/2020    GLUCOSE 120 (H) 02/25/2020    BUN 11 02/25/2020    CREATININE 1.2 02/25/2020      Lab Results   Component Value Date    ALDOSTERONE 28 07/01/2019      Lab Results   Component Value Date    URCREAT 242.61 07/01/2019    MICROALBUR 156.5 (H) 07/01/2019    MALBCRT 65 (H) 07/01/2019       Results for JEANNETTE MADELEINE SWETA PLUNKETT (MRN [id number]) as of 3/5/2020 10:19   Ref. Range 11/20/2019 07:15 2/25/2020 07:00   LDL Direct Latest Ref Range: <100 mg/dL 130 (H) 25     VASCULAR IMAGING:     Echo 2017  No prior study is available for comparison.   Left ventricular ejection fraction is visually estimated to be 60%.   Normal diastolic function. Poor endocardial definition difficult to   assess wall motion.  No evidence of valvular abnormality based on Doppler evaluation.   Estimated right ventricular systolic pressure  is 25 mmHg.    Echo 7/25/19  Normal left ventricular systolic function. Left ventricular ejection   fraction is visually estimated to be 55%.   Normal diastolic function.    Renal art duplex 7/31/19  Unremarkable renal sonogram with no evidence of renal artery stenosis as demonstrated above      Javad Childs M.D.

## 2020-03-05 NOTE — PATIENT INSTRUCTIONS
1) ask pharmacist if jardiance or Farxiga are less expensive than invokana   2) continue all other medications  3) ok to stop invokana and we can monitor your A1c   4) reduce doxazosin to 1/2 tablet, take at bedtime     - continue all current medications, see updated medication list for changes     BLOOD PRESSURE:  - if you notice BP > 140/>90 for more than 3 days, then contact our office ([facility], [phone]) for further instructions    If you are being treated for blood pressure today: please be advised that stabilizing BP may require multiple med changes and close monitoring over the next several months and initially there may be additional imaging and labs and the possibility of adverse drug reactions. Variability of your blood pressure and heart rate may occur until we have things stabilized.  Please feel free to contact our office as needed for questions or concerns.        SODIUM RESTRICTIONS:   - consume no more than 2,300mg of sodium daily (look at food labels) ,or if you have high BP then reduce to no more than 1,500mg of sodium daily   For more information visit: https://www.[employer]/contents/low-sodium-diet-the-basics/print?uxdazQfp=4393&source=see_link       DASH DIET:  Overview: (https://www.heart.org/en/health-topics/high-blood-pressure/changes-you-can-make-to-manage-high-blood-pressure/managing-blood-pressure-with-a-heart-healthy-diet)  Tips for shopping:  https://www.[facility].org/healthy-lifestyle/nutrition-and-healthy-eating/in-depth/dash-diet/art-39021231?p=1   DASH is a flexible and balanced eating plan that helps create a heart-healthy eating style for life.  The DASH eating plan requires no special foods and instead provides daily and weekly nutritional goals. This plan recommends:  · Eating vegetables, fruits, and whole grains  · Including fat-free or low-fat dairy products, fish, poultry, beans, nuts, and vegetable oils  · Limiting foods that are high in saturated  fat, such as fatty meats, full-fat dairy products, and tropical oils such as coconut, palm kernel, and palm oils  · Limiting sugar-sweetened beverages and sweets.  Based on these recommendations, the following table shows examples of daily and weekly servings that meet DASH eating plan targets for a 2,872-iwplfvy-m-day diet.    CHOLESTEROL-REDUCING DIETS:  1) AHA diet  (http://www.heart.org/en/healthy-living/healthy-eating/eat-smart/nutrition-basics/aha-diet-and-lifestyle-recommendations)   2) Mediterranean diet (http://www.heart.org/en/healthy-living/healthy-eating/eat-smart/nutrition-basics/mediterranean-diet)   3) Lowering triglycerides: (http://my.americanheart.org/idc/groups/ahamah-public/@[facility]/@sop/@[facility]/documents/downloadable/ucm_425988.pdf)     PHYSICAL ACTIVITY:  Unless directed otherwise at today's visit or you are physically incapable due to your current health or medical conditions, it is advised per the American Heart Association to engage in moderate to vigorous physical activity such as brisk walking, swimming, cycling, >150 minutes per week at 30-40 minutes per session, 3 to 5 times weekly.      GENERAL HEALTHY DIET ADVICE:  - the USDA food pattern (https://www.cnpp.usda.gov/USDAFoodPatterns)  - plate method (https://www.choosemyplate.gov/)  - consume diet that emphasizes intake of vegetables, fruits, and whole grains,  - use low fat diary products, poultry, fish, legumes, nontropical vegetable oils, nuts  - limit intake of sweets, sugar-sweetned beverages, and red meats   - reduce saturated and trans fats to <6% of your daily calories

## 2020-05-27 ASSESSMENT — ENCOUNTER SYMPTOMS
NERVOUS/ANXIOUS: 0
DOUBLE VISION: 0
BRUISES/BLEEDS EASILY: 0
DIZZINESS: 1
CHILLS: 0
FEVER: 0
DEPRESSION: 0
ORTHOPNEA: 0
HEADACHES: 0
VOMITING: 0
SHORTNESS OF BREATH: 0
COUGH: 0
TREMORS: 0
BLURRED VISION: 0
PALPITATIONS: 0
WEAKNESS: 0
INSOMNIA: 0
DIARRHEA: 0
ABDOMINAL PAIN: 0
WHEEZING: 0
FOCAL WEAKNESS: 0
SEIZURES: 0
HEMOPTYSIS: 0
NAUSEA: 1
MYALGIAS: 0
SORE THROAT: 0
BLOOD IN STOOL: 0

## 2020-05-27 NOTE — PROGRESS NOTES
RESISTANT HTN [facility]INIC - INITIAL VISIT   5/28/20    Assessment / Plan:   1. Blood Pressure Control:  Qualifies for resistant HTN due to 4 meds w/o control   ACC/AHA (2017) goal <130/80, consider less stringent goals based upon pt preference and polypharmacy potential, though <140/80 at the most   Home BP at goal:  no  Office BP at goal:  ?, hx of white coat effect   24h ABPM (12/2019): Mean daytime: 146/75 consistent with poorly controlled out of office systolic blood pressure. Nocturnal dip: Appears normal  Device candidate? No    Plan:   Monitoring:   - continue home BP monitoring, reviewed correct technique:  Yes   - order 24h ABPM:  Ordered today   - monitor lytes/gfr routinely     2. Work up of Secondary Causes of Resistant Hypertension:   Renovascular HTN: Excluded  Primary Aldosteronism: Excluded, high renin  Thyroid Function: Excluded  Obstructive Sleep Apnea: using BiPAP, reports good compliance, pending with pulm MD in 2-3 weeks, minimal good nights sleep   Pheochromocytoma: Not Yet Assessed  Other: none  Recommendations At This Visit:        3. Medication Use / Adherence:  Assessment: Complete  Recommendations: Instructed to Continue Taking All Medications As Prescribed         4. End Organ Damage:   Left Ventricular Hypertrophy: Absent on  Echocardiogram Date: 2017  Albuminuria: Microalbuminuria, level A2 on Date: 10/2018  Renal Function: Chronic Kidney Disease  Stage 3a   - avoid nephrotoxins  - continue HTN control with RAAS blockade   - monitor lytes/gfr routinely  - eval with nephrology if worsening over time    Established Cardiovascular Disease: Present: hx of CAD, s/p CABG - see below     5. Lifestyle Recommendations:  Smoking: continued complete avoidance of all tobacco products     Physical Activity: continue healthy activity to improve CV fitness, see care instructions for additional details     Weight Management and Nutrition: Dietary plan was discussed with patient at this visit including  DASH, low sodium and/or as outlined in care instructions     6. Standard HTN Pharmacotherapy:  ACEI/ARB: continue trandolapril 4mg QHS for true 24h coverage, consider dose reduction due to ? ED    Thiazide Type Diuretic: stop due to worsening ED  Calcium Channel Blocker (CCB): continue amlodipine 5mg BID, vasodilatory edema should be balanced by eplerenone    7. Additional Agents:  Aldosterone Antagonist: eplerenone 50mg once daily   Loop Diuretic: none   Peripheral Alpha Blocker: reduce doxazosin 2mg to 1/2 tab (1mg) at bedtime   Other CCB: none  Direct Vasodilator: none  Centrally Acting Alpha Agonist: prefer to avoid clonidine due to rebound HTN effects, will consider prazosin if BP spikes start occurring   Other:   BB: start carvedilol 3.125mg BID      8. Other CV Risk Factors:     Lipid management:   Myalgias on rosuva and 1/2 dose pravastatin. Has failed simva, atorva, prava.    PSCK9i indicated per 2018 ACC/AHA guidelines in this patient with established ASCVD whose LDL-C remains above threshold of 70 mg/dl despite maximally tolerated statin therapy.  Praluent tolerated, PA approved for 2020. Excellent response to pcsk9i   NLA Risk Category:  Very high:  Evidence of ASCVD and T2D  Tx goal: non-HDL-C <100,  LDL-C <70  (optional: apoB <80)  At goal:  yes  Plan:  - reinforced ongoing TLC measures   - continue zetia 10mg daily   - continue praluent 150mg daily.    - update labs 1 week after 3rd injection  Per last orders  - continue vitamin D3 [id number] daily     DM: type 2, non-insulin   Last A1c = 6.0   Goal A1c <7.0%   ACR: abnormal: A2  Cost of invokana is $500/90 day  Does not seem to be greg Metformin more that once daily  Plan:  - continue metformin 500 mg Qpm due to SE unable to start ER form.     - holding Invokana 100 mg for now due to expense, consider restart if A1c elevates switch to Jardiance or Farxiga if cost is better - pt to call us   - consider glp1RA weekly as 3rd agent   - recommmend for  routine care with PCP (or endocrine) to include regular A1c monitoring, annual albumin/creatinine ratio (ACR), annual diabetic retinopathy screening, foot exams, annual flu vaccine, and updates to pneumonia vaccines as appropriate     Smoking: ended year 1987 or pack years of use 25    ANTITHROMBOTIC THERAPY:   Unable to afford xarelto 2.5mg BID, so will stop ASA + xarelto  Due to slight advantage of plavix over ASA, recommend plavix for monotherapy.  - stop xarelto and ASA  - start plavix 75mg daily     9. Other Issues:    - Afib - hx of paroxysmal Afib post-op CABG.    No current, has been off BB and xarelto in 2013.   No recurrence, so low risk for CVA, no OAC recommended at this time.    - Continue monitoring per cardiology     - hypogonadism, erectile dysfunction, stable and improved off thiazide and spironolactone.  Pt aware of adverse effects of testosterone on lipids    - CHOCO -continue f/u with sleep MD     - CAD - s/p CABG, 12/2013, still in high risk stage <10yrs from operation   - continue aggressive med mgmt, qualifies for pcsk9i  - ongoing care with cardiology     Studies Ordered At This Visit:  None   Blood Work to Be Obtained Prior to Next Visit:  As noted   Disposition: sept     Diagnosis:  1. Resistant hypertension  carvedilol (COREG) 3.125 MG Tab   2. Coronary artery disease involving native heart without angina pectoris, unspecified vessel or lesion type  clopidogrel (PLAVIX) 75 MG Tab    carvedilol (COREG) 3.125 MG Tab   3. Type 2 diabetes mellitus without complications, without long-term current use of insulin     4. Dyslipidemia     5. Hypertriglyceridemia     6. Long term (current) use of antithrombotics/antiplatelets  clopidogrel (PLAVIX) 75 MG Tab   7. Obstructive sleep apnea syndrome     8. Hx of CABG          History of Present Illness:   Wang Forman Jr. is a male seen for evaluation of resistant HTN and management.   Wang Forman Jr. is referred by:cardiologist  Scott Cameron,*.    Visit completed via [employer] video call due to restrictions of COVID-19 pandemic.  All issues as below were discussed and addressed by no physical exam was performed unless allowed by visual confirmation on [employer] If it was felt that patient should be evaluated in the clinic, there were directed there.  Patient verbally consented to mAPPn tele-video visit.     HTN:  Current HTN concerns:  Noting climbing BP 150s/80s most days.  Concerned about elevations, but denies sx.    Current ADRs: none   Home BP los-150s/80s  24h ABPM completed: not tested  Adherence to current HTN meds: compliant all of the time     Antiplatelet/anticoagulation:   Started on ASA with xarelto 2.5mg BID.    Reports cost is an issues - $355/90d, too expensive and would like to d/c   No hx of Afib since     Hyperlipidemia:    Stopped prava due to myalgias  Current treatment: zetia, Praluent   Myalgias? no  Other adverse drug reactions? No  Lipid profile: as noted below       T2D:   No sx. toleraring meds   Last A1c 6.0, down from 7.5    CHOCO:   Still daily somnolence and reduced energy. feels poorly rested daily, currently on Bipap, seening pulm MD.    Pertinent HTN hx (reviewed at initial visit):   Age at HTN dx: >10yrs  Past HTN medications: unsure of prior   HTN crises:  No prior hospitalization or crises   Lifestyle factors:     High salt: Yes, Details: knows to keep Na low    EtOH: No  Interfering substances:  None     Clinical evidence of ASCVD:     1) hx of MI/ACS:  Yes, Details: s/p CABG  (Dr. Love)   2) coronary or other revascularization procedure: PCI with stenting    Major RFs:     1) Age (Men>44, women>54):  yes   2) Fhx early CAD (<55 men, <65 women):  no   3) cigarette smoking:  Yes, Details: former, quit    4) high BP >139/89 or on tx: yes   5) Low HDL-C (<40 men, <50 women):  no  Current Outpatient Medications on File Prior to Visit   Medication Sig Dispense Refill   •  doxazosin (CARDURA) 2 MG Tab Take 0.5 Tabs by mouth every bedtime for 360 days. Take before bed 45 Tab 3   • Eplerenone 50 MG Tab Take 1 Tab by mouth 2 Times a Day for 360 days. 180 Tab 3   • Metformin HCl 500 MG TABLET SR 24 HR Take 1 Tab by mouth every day for 360 days. 90 Tab 3   • amLODIPine (NORVASC) 10 MG Tab Take 1 Tab by mouth every day for 360 days. 90 Tab 3   • Canagliflozin (INVOKANA) 100 MG Tab Take 100 mg by mouth every day. Before first meal 30 Tab 4   • Alirocumab 150 MG/ML Solution Pen-injector Inject 1 Dose as instructed every 14 days for 360 days. Instructions: inject contents of one pen subcutaneously every 14 days as instructed. 6 PEN 3   • ezetimibe (ZETIA) 10 MG Tab Take 1 Tab by mouth every day for 360 days. 90 Tab 3   • cholecalciferol (VITAMIN D3) 400 UNIT Tab Take 400 Units by mouth every day.     • pantoprazole (PROTONIX) 40 MG Tablet Delayed Response TAKE 1 TABLET BY MOUTH ONCE DAILY 90 Tab 3   • trandolapril (MAVIK) 4 MG Tab Take 1 Tab by mouth every bedtime for 360 days. 90 Tab 3   • acarbose (PRECOSE) 50 MG Tab TAKE ONE TABLET BY MOUTH TWICE DAILY 180 Tab 3   • doxycycline (VIBRAMYCIN) 100 MG Tab Take 1 Tab by mouth 2 times a day. 10 Tab 0   • therapeutic multivitamin-minerals (THERAGRAN-M) Tab Take 1 Tab by mouth every day.     • testosterone cypionate (DEPO-TESTOSTERONE) 200 MG/ML Solution injection 1 mL by Intramuscular route every 21 days. 1 mL 11     No current facility-administered medications on file prior to visit.           Social History:     Social History     Socioeconomic History   • Marital status:      Spouse name: Not on file   • Number of children: 1   • Years of education: Not on file   • Highest education level: Not on file   Occupational History   • Not on file   Social Needs   • Financial resource strain: Not on file   • Food insecurity     Worry: Not on file     Inability: Not on file   • Transportation needs     Medical: Not on file     Non-medical: Not on  file   Tobacco Use   • Smoking status: Former Smoker     Packs/day: 2.00     Years: 25.00     Pack years: 50.00     Types: Cigarettes     Last attempt to quit: 1987     Years since quittin.4   • Smokeless tobacco: Never Used   Substance and Sexual Activity   • Alcohol use: Yes     Alcohol/week: 0.0 oz     Comment: a few times a year   • Drug use: No   • Sexual activity: Not on file     Comment:    Lifestyle   • Physical activity     Days per week: 0 days     Minutes per session: 0 min   • Stress: Not on file   Relationships   • Social connections     Talks on phone: Not on file     Gets together: Not on file     Attends [employer] service: Not on file     Active member of club or organization: Not on file     Attends meetings of clubs or organizations: Not on file     Relationship status: Not on file   • Intimate partner violence     Fear of current or ex partner: Not on file     Emotionally abused: Not on file     Physically abused: Not on file     Forced sexual activity: Not on file   Other Topics Concern   • Not on file   Social History Narrative   • Not on file     Allergies   Allergen Reactions   • Naproxen Anaphylaxis   • Nsaids Anaphylaxis   • Codeine      Nausea    • Morphine Nausea   • Sulfa Drugs Nausea   • Rosuvastatin Calcium Myalgia        Review of Systems:   Review of Systems   Constitutional: Positive for malaise/fatigue. Negative for chills and fever.   HENT: Negative for nosebleeds, sore throat and tinnitus.    Eyes: Negative for blurred vision and double vision.   Respiratory: Negative for cough, hemoptysis, shortness of breath and wheezing.    Cardiovascular: Negative for chest pain, palpitations, orthopnea and leg swelling.   Gastrointestinal: Positive for nausea. Negative for abdominal pain, blood in stool, diarrhea, melena and vomiting.   Genitourinary: Negative for hematuria.   Musculoskeletal: Negative for joint pain and myalgias.   Skin: Negative for itching and rash.    Neurological: Positive for dizziness. Negative for tremors, focal weakness, seizures, weakness and headaches.   Endo/Heme/Allergies: Does not bruise/bleed easily.   Psychiatric/Behavioral: Negative for depression. The patient is not nervous/anxious and does not have insomnia.         Objective:     There were no vitals filed for this visit.  BP Readings from Last 5 Encounters:   03/05/20 127/59   02/28/20 124/62   01/20/20 132/63   12/05/19 144/74   11/22/19 130/74      There is no height or weight on file to calculate BMI.    Physical Exam   Constitutional: He is oriented to person, place, and time. He appears well-developed and well-nourished. No distress.   Neck: No JVD present.   Cardiovascular: Normal rate, regular rhythm, normal heart sounds and intact distal pulses. Exam reveals no gallop and no friction rub.   No murmur heard.  Pulmonary/Chest: Effort normal and breath sounds normal. No respiratory distress. He has no wheezes. He has no rales.   Musculoskeletal:         General: No tenderness or edema.   Neurological: He is alert and oriented to person, place, and time. Coordination normal.   Skin: Skin is warm and dry. No rash noted. He is not diaphoretic. No erythema. No pallor.   Psychiatric: He has a normal mood and affect. His behavior is normal.        Accessory Clinical Findings:   Echocardiogram:  Results for orders placed or performed during the hospital encounter of 09/26/17   ECHOCARDIOGRAM COMP W/O CONT   Result Value Ref Range    Eject.Frac. MOD BP 57.79     Eject.Frac. MOD 4C 56.25     Eject.Frac. MOD 2C 62.39       Lab Results   Component Value Date    CHOLSTRLTOT 74 (L) 02/25/2020    LDL 7 02/25/2020    HDL 32.0 (L) 02/25/2020    TRIGLYCERIDE 176 (H) 02/25/2020         Lab Results   Component Value Date    HBA1C 6.0 (A) 02/28/2020      Lab Results   Component Value Date    SODIUM 141 02/25/2020    POTASSIUM 4.3 02/25/2020    CHLORIDE 105 02/25/2020    CO2 26 02/25/2020    GLUCOSE 120 (H)  02/25/2020    BUN 11 02/25/2020    CREATININE 1.2 02/25/2020      Lab Results   Component Value Date    ALDOSTERONE 28 07/01/2019      Lab Results   Component Value Date    URCREAT 242.61 07/01/2019    MICROALBUR 156.5 (H) 07/01/2019    MALBCRT 65 (H) 07/01/2019        Ref. Range 7/1/2019 13:20 7/23/2019 07:25 8/26/2019 11:05   CPK Total Latest Ref Range: 39 - 308 U/L 453 (H) 170 240      Ref. Range 11/20/2019 07:15 2/25/2020 07:00   LDL Direct Latest Ref Range: <100 mg/dL 130 (H) 25       VASCULAR IMAGING:     Echo 2017  No prior study is available for comparison.   Left ventricular ejection fraction is visually estimated to be 60%.   Normal diastolic function. Poor endocardial definition difficult to   assess wall motion.  No evidence of valvular abnormality based on Doppler evaluation.   Estimated right ventricular systolic pressure  is 25 mmHg.    Echo 7/25/19  Normal left ventricular systolic function. Left ventricular ejection   fraction is visually estimated to be 55%.   Normal diastolic function.    Renal art duplex 7/31/19  Unremarkable renal sonogram with no evidence of renal artery stenosis as demonstrated above      Javad Childs M.D.

## 2020-05-28 ENCOUNTER — OFFICE VISIT (OUTPATIENT)
Dept: VASCULAR LAB | Facility: MEDICAL CENTER | Age: 75
End: 2020-05-28
Attending: FAMILY MEDICINE
Payer: COMMERCIAL

## 2020-05-28 DIAGNOSIS — Z79.02 LONG TERM (CURRENT) USE OF ANTITHROMBOTICS/ANTIPLATELETS: ICD-10-CM

## 2020-05-28 DIAGNOSIS — E11.9 DIET-CONTROLLED TYPE 2 DIABETES MELLITUS (HCC): ICD-10-CM

## 2020-05-28 DIAGNOSIS — G47.33 OBSTRUCTIVE SLEEP APNEA SYNDROME: ICD-10-CM

## 2020-05-28 DIAGNOSIS — I25.10 CORONARY ARTERY DISEASE INVOLVING NATIVE HEART WITHOUT ANGINA PECTORIS, UNSPECIFIED VESSEL OR LESION TYPE: ICD-10-CM

## 2020-05-28 DIAGNOSIS — Z95.1 HX OF CABG: ICD-10-CM

## 2020-05-28 DIAGNOSIS — I1A.0 RESISTANT HYPERTENSION: ICD-10-CM

## 2020-05-28 DIAGNOSIS — E78.1 HYPERTRIGLYCERIDEMIA: ICD-10-CM

## 2020-05-28 DIAGNOSIS — E78.5 DYSLIPIDEMIA: ICD-10-CM

## 2020-05-28 PROCEDURE — 99214 OFFICE O/P EST MOD 30 MIN: CPT | Mod: 95,CR | Performed by: FAMILY MEDICINE

## 2020-05-28 RX ORDER — CLOPIDOGREL BISULFATE 75 MG/1
75 TABLET ORAL DAILY
Qty: 90 TAB | Refills: 3 | Status: SHIPPED | OUTPATIENT
Start: 2020-05-28 | End: 2021-05-23

## 2020-05-28 RX ORDER — CARVEDILOL 3.12 MG/1
3.12 TABLET ORAL 2 TIMES DAILY WITH MEALS
Qty: 180 TAB | Refills: 3 | Status: SHIPPED | OUTPATIENT
Start: 2020-05-28 | End: 2020-06-08

## 2020-05-28 NOTE — PATIENT INSTRUCTIONS
- continue all current medications, see updated medication list for changes     BLOOD PRESSURE:  - if you notice BP > 140/>90 for more than 3 days, then contact our office ([facility], [phone]) for further instructions    If you are being treated for blood pressure today: please be advised that stabilizing BP may require multiple med changes and close monitoring over the next several months and initially there may be additional imaging and labs and the possibility of adverse drug reactions. Variability of your blood pressure and heart rate may occur until we have things stabilized.  Please feel free to contact our office as needed for questions or concerns.        SODIUM RESTRICTIONS:   - consume no more than 2,300mg of sodium daily (look at food labels) ,or if you have high BP then reduce to no more than 1,500mg of sodium daily   For more information visit: https://www.[employer]contents/low-sodium-diet-the-basics/print?znfqxMuf=3460&source=see_link       DASH DIET:  Overview: (https://www.heart.org/en/health-topics/high-blood-pressure/changes-you-can-make-to-manage-high-blood-pressure/managing-blood-pressure-with-a-heart-healthy-diet)  Tips for shopping:  https://www.[facility].org/healthy-lifestyle/nutrition-and-healthy-eating/in-depth/dash-diet/art-07874455?p=1   DASH is a flexible and balanced eating plan that helps create a heart-healthy eating style for life.  The DASH eating plan requires no special foods and instead provides daily and weekly nutritional goals. This plan recommends:  · Eating vegetables, fruits, and whole grains  · Including fat-free or low-fat dairy products, fish, poultry, beans, nuts, and vegetable oils  · Limiting foods that are high in saturated fat, such as fatty meats, full-fat dairy products, and tropical oils such as coconut, palm kernel, and palm oils  · Limiting sugar-sweetened beverages and sweets.  Based on these recommendations, the following table  shows examples of daily and weekly servings that meet DASH eating plan targets for a 2,274-ekwhebt-t-day diet.    CHOLESTEROL-REDUCING DIETS:  1) AHA diet  (http://www.heart.org/en/healthy-living/healthy-eating/eat-smart/nutrition-basics/aha-diet-and-lifestyle-recommendations)   2) Mediterranean diet (http://www.heart.org/en/healthy-living/healthy-eating/eat-smart/nutrition-basics/mediterranean-diet)   3) Lowering triglycerides: (http://my.americanheart.org/idc/groups/ahamah-public/@wc/@sop/@[facility]/documents/downloadable/[facility]_425988.pdf)     PHYSICAL ACTIVITY:  Unless directed otherwise at today's visit or you are physically incapable due to your current health or medical conditions, it is advised per the American Heart Association to engage in moderate to vigorous physical activity such as brisk walking, swimming, cycling, >150 minutes per week at 30-40 minutes per session, 3 to 5 times weekly.      GENERAL HEALTHY DIET ADVICE:  - the USDA food pattern (https://www.cnpp.usda.gov/USDAFoodPatterns)  - plate method (https://www.choosemyplate.gov/)  - consume diet that emphasizes intake of vegetables, fruits, and whole grains,  - use low fat diary products, poultry, fish, legumes, nontropical vegetable oils, nuts  - limit intake of sweets, sugar-sweetned beverages, and red meats   - reduce saturated and trans fats to <6% of your daily calories

## 2020-06-08 ENCOUNTER — TELEPHONE (OUTPATIENT)
Dept: VASCULAR LAB | Facility: MEDICAL CENTER | Age: 75
End: 2020-06-08

## 2020-06-08 DIAGNOSIS — I1A.0 RESISTANT HYPERTENSION: ICD-10-CM

## 2020-06-08 RX ORDER — METOPROLOL SUCCINATE 25 MG/1
TABLET, EXTENDED RELEASE ORAL
Qty: 30 TAB | Refills: 1 | Status: SHIPPED | OUTPATIENT
Start: 2020-06-08 | End: 2020-10-05

## 2020-06-08 NOTE — TELEPHONE ENCOUNTER
Spoke with the pt about edema that he is now experiencing with the start of Coreg 3.125. His BP did improve but he is unable to continue on the med. He can't put his shoes on his feet are so swollen. Changes to ToprolXL 1/2 tab daily. Pt to monitor BP and HR and let us know if HR < 50. MUNDO Young.

## 2020-06-12 ENCOUNTER — TELEPHONE (OUTPATIENT)
Dept: VASCULAR LAB | Facility: MEDICAL CENTER | Age: 75
End: 2020-06-12

## 2020-06-12 NOTE — TELEPHONE ENCOUNTER
Attempted to reach patient regarding his medication questions, multiple attempts made and every time the phone line was busy.

## 2020-06-22 ENCOUNTER — TELEPHONE (OUTPATIENT)
Dept: VASCULAR LAB | Facility: MEDICAL CENTER | Age: 75
End: 2020-06-22

## 2020-06-22 DIAGNOSIS — I1A.0 RESISTANT HYPERTENSION: ICD-10-CM

## 2020-06-22 RX ORDER — TRANDOLAPRIL TABLETS 4 MG/1
4 TABLET ORAL DAILY
Qty: 90 TAB | Refills: 1 | Status: SHIPPED | OUTPATIENT
Start: 2020-06-22 | End: 2020-12-28

## 2020-06-22 NOTE — TELEPHONE ENCOUNTER
Spoke with the pt regarding his leg swelling. He left a message that he was having difficulty walking due to the swelling. He now says that he still has some leg swelling not as severe and it is tolerable. He is not able to use his stockings. MUNDO Young.

## 2020-06-26 DIAGNOSIS — I1A.0 RESISTANT HYPERTENSION: ICD-10-CM

## 2020-09-10 ENCOUNTER — OFFICE VISIT (OUTPATIENT)
Dept: VASCULAR LAB | Facility: MEDICAL CENTER | Age: 75
End: 2020-09-10
Attending: FAMILY MEDICINE
Payer: COMMERCIAL

## 2020-09-10 VITALS
HEART RATE: 52 BPM | SYSTOLIC BLOOD PRESSURE: 128 MMHG | WEIGHT: 210.1 LBS | BODY MASS INDEX: 32.98 KG/M2 | HEIGHT: 67 IN | DIASTOLIC BLOOD PRESSURE: 63 MMHG

## 2020-09-10 DIAGNOSIS — Z95.1 HX OF CABG: ICD-10-CM

## 2020-09-10 DIAGNOSIS — Z79.02 LONG TERM (CURRENT) USE OF ANTITHROMBOTICS/ANTIPLATELETS: ICD-10-CM

## 2020-09-10 DIAGNOSIS — E11.9 TYPE 2 DIABETES MELLITUS WITHOUT COMPLICATION, WITHOUT LONG-TERM CURRENT USE OF INSULIN (HCC): ICD-10-CM

## 2020-09-10 DIAGNOSIS — I1A.0 RESISTANT HYPERTENSION: ICD-10-CM

## 2020-09-10 DIAGNOSIS — E78.1 HYPERTRIGLYCERIDEMIA: ICD-10-CM

## 2020-09-10 DIAGNOSIS — E11.9 DIET-CONTROLLED TYPE 2 DIABETES MELLITUS (HCC): ICD-10-CM

## 2020-09-10 DIAGNOSIS — I25.10 CORONARY ARTERY DISEASE INVOLVING NATIVE HEART WITHOUT ANGINA PECTORIS, UNSPECIFIED VESSEL OR LESION TYPE: ICD-10-CM

## 2020-09-10 DIAGNOSIS — I10 ESSENTIAL HYPERTENSION: ICD-10-CM

## 2020-09-10 DIAGNOSIS — G47.33 OBSTRUCTIVE SLEEP APNEA SYNDROME: ICD-10-CM

## 2020-09-10 DIAGNOSIS — E78.5 DYSLIPIDEMIA: ICD-10-CM

## 2020-09-10 DIAGNOSIS — N52.9 ERECTILE DYSFUNCTION, UNSPECIFIED ERECTILE DYSFUNCTION TYPE: ICD-10-CM

## 2020-09-10 PROCEDURE — 99214 OFFICE O/P EST MOD 30 MIN: CPT | Performed by: FAMILY MEDICINE

## 2020-09-10 PROCEDURE — 99212 OFFICE O/P EST SF 10 MIN: CPT | Performed by: FAMILY MEDICINE

## 2020-09-10 ASSESSMENT — ENCOUNTER SYMPTOMS
SORE THROAT: 0
ORTHOPNEA: 0
FEVER: 0
DOUBLE VISION: 0
TREMORS: 0
MYALGIAS: 0
HEADACHES: 0
BLOOD IN STOOL: 0
WHEEZING: 0
BRUISES/BLEEDS EASILY: 0
WEAKNESS: 0
FOCAL WEAKNESS: 0
VOMITING: 0
CHILLS: 0
DEPRESSION: 0
HEMOPTYSIS: 0
SHORTNESS OF BREATH: 0
INSOMNIA: 0
COUGH: 0
BLURRED VISION: 0
DIZZINESS: 1
SEIZURES: 0
DIARRHEA: 0
ABDOMINAL PAIN: 0
PALPITATIONS: 0
NAUSEA: 1
NERVOUS/ANXIOUS: 0

## 2020-09-10 ASSESSMENT — FIBROSIS 4 INDEX: FIB4 SCORE: 1.74

## 2020-09-10 NOTE — PROGRESS NOTES
[facility] - INITIAL VISIT   09/10/20     Assessment / Plan:   1. Blood Pressure Control:  Qualifies for resistant HTN due to 4 meds w/o control   ACC/AHA (2017) goal <130/80, consider less stringent goals based upon pt preference and polypharmacy potential, though <140/80 at the most    Home BP at goal:  Yes    Office BP at goal:  Yes    24h ABPM (12/2019): Mean daytime: 146/75 consistent with poorly controlled out of office  systolic blood pressure. Nocturnal dip: Appears normal   Device candidate? No    Plan:   Monitoring:   - continue home BP monitoring, reviewed correct technique:  Yes   - order 24h ABPM:  Ordered today   - monitor lytes/gfr routinely     2. Work up of Secondary Causes of Resistant Hypertension:   Renovascular HTN: Excluded  Primary Aldosteronism: Excluded, high renin  Thyroid Function: Excluded  Obstructive Sleep Apnea: using BiPAP, reports good compliance, pending with pulm MD in 2-3 weeks, minimal good nights sleep   Pheochromocytoma: Not Yet Assessed  Other: none  Recommendations At This Visit:        3. Medication Use / Adherence:  Assessment: Complete  Recommendations: Instructed to Continue Taking All Medications As Prescribed         4. End Organ Damage:   Left Ventricular Hypertrophy: Absent on  Echocardiogram Date: 2017  Albuminuria: Microalbuminuria, level A2 on Date: 10/2018  Renal Function: Chronic Kidney Disease  Stage 3a   - avoid nephrotoxins  - continue HTN control with RAAS blockade   - monitor lytes/gfr routinely  - eval with nephrology if worsening over time    Established Cardiovascular Disease: Present: hx of CAD, s/p CABG - see below     5. Lifestyle Recommendations:  Smoking: continued complete avoidance of all tobacco products     Physical Activity: continue healthy activity to improve CV fitness, see care instructions for additional details     Weight Management and Nutrition: Dietary plan was discussed with patient at this visit including DASH, low sodium  and/or as outlined in care instructions     6. Standard HTN Pharmacotherapy:  ACEI/ARB: continue trandolapril 4mg QHS for true 24h coverage, consider dose reduction due to ? ED    Thiazide Type Diuretic: stop due to worsening ED  Calcium Channel Blocker (CCB): continue amlodipine 5mg BID, vasodilatory edema should be balanced by eplerenone    7. Additional Agents:  Aldosterone Antagonist: eplerenone 50mg once daily   Loop Diuretic: none   Peripheral Alpha Blocker: continue doxazosin 2mg to 1/2 tab (1mg) at bedtime   Other CCB: none  Direct Vasodilator: none  Centrally Acting Alpha Agonist: prefer to avoid clonidine due to rebound HTN effects, will consider prazosin if BP spikes start occurring   Other:   BB: excess swelling with carvedilol, changed to metoprolol 25mg ER daily      8. Other CV Risk Factors:     Lipid management:   Myalgias on rosuva and 1/2 dose pravastatin. Has failed simva, atorva, prava.    PSCK9i indicated per 2018 ACC/AHA guidelines in this patient with established ASCVD whose LDL-C remains above threshold of 70 mg/dl despite maximally tolerated statin therapy.  Praluent tolerated, PA approved for 2020. Excellent response to pcsk9i   NLA Risk Category:  Very high:  Evidence of ASCVD and T2D   Tx goal: non-HDL-C <100,  LDL-C <70  (optional: apoB <80)   At goal:  Yes, LDLc 25  Plan:  - reinforced ongoing TLC measures   - continue praluent 150mg daily.    - stop zetia   - update labs 3mo, if stable, then Q6mo - track direct LDLc due to very low calc LDLc   - continue vitamin D3 3268-6245 daily     DM: type 2, non-insulin  Lab Results   Component Value Date    HBA1C 5.7 (H) 08/10/2020     Goal A1c <7.0%   ACR: abnormal: A2  invokana too expensive   Does not seem to be greg Metformin more that once daily  Plan:  - continue metformin 500 mg Qpm due to SE unable to start ER form.     - holding Invokana 100 mg for now due to expense, consider restart if A1c elevates switch to Jardiance or Farxiga if cost  is better - pt to call us   - consider glp1RA weekly as 3rd agent   - recommmend for routine care with PCP (or endocrine) to include regular A1c monitoring, annual albumin/creatinine ratio (ACR), annual diabetic retinopathy screening, foot exams, annual flu vaccine, and updates to pneumonia vaccines as appropriate     Smoking: ended year 1987 or pack years of use 25    ANTITHROMBOTIC THERAPY:   Unable to afford xarelto 2.5mg BID, so will stop ASA + xarelto  Due to slight advantage of plavix over ASA, recommend plavix for monotherapy.  - continue plavix 75mg daily     9. Other Issues:    - Afib - hx of paroxysmal Afib post-op CABG.  Stable   No current, has been off BB and xarelto in 2013.   No recurrence, so low risk for CVA, no OAC recommended at this time.    - Continue monitoring per cardiology     - hypogonadism, erectile dysfunction, stable and improved off thiazide and spironolactone.  Pt aware of adverse effects of testosterone on lipids    - CHOCO - stable, continue f/u with sleep MD     - CAD - s/p CABG, 12/2013, still in high risk stage <10yrs from operation, stable    - continue aggressive med mgmt, qualifies for pcsk9i  - ongoing care with cardiology     Studies Ordered At This Visit:  None   Blood Work to Be Obtained Prior to Next Visit:  As noted   Disposition: 3mo     Diagnosis:  1. Resistant hypertension     2. Dyslipidemia  Comp Metabolic Panel    Lipid Profile    LDL, DIRECT   3. Coronary artery disease involving native heart without angina pectoris, unspecified vessel or lesion type     4. Type 2 diabetes mellitus without complications, without long-term current use of insulin     5. Hypertriglyceridemia     6. Long term (current) use of antithrombotics/antiplatelets     7. Obstructive sleep apnea syndrome     8. Hx of CABG     9. Erectile dysfunction, unspecified erectile dysfunction type     10. Essential hypertension     11. Type 2 diabetes mellitus without complication, without long-term current  use of insulin (HCC)          History of Present Illness:   Wang Forman Jr. is a male seen for evaluation of resistant HTN and management.   Wang Forman Jr. is referred by:cardiologist Scott Cameron,*.    HTN:  Current HTN concerns:  Feeling well. No issues .    Current ADRs: none   Home BP los-150s/80s  24h ABPM completed: not tested  Adherence to current HTN meds: compliant all of the time     Antiplatelet/anticoagulation:   Plavix, tolerating w/o bleeding   No hx of Afib since     Hyperlipidemia:    Interval hx/concerns:  Wondering about very low LDLc numbers   Current treatment: zetia, Praluent   Myalgias? no  Other adverse drug reactions? No  Lipid profile: as noted below       T2D:  Stable. No current symptoms reported.   Tolerating meds and no recent med changes.   Home blood sugars stable, reports no lows.   Last A1c   Lab Results   Component Value Date    HBA1C 5.7 (H) 08/10/2020     CHOCO:   Still daily somnolence and reduced energy. feels poorly rested daily, currently on Bipap, seening pulm MD.    Pertinent HTN hx (reviewed at initial visit):   Age at HTN dx: >10yrs  Past HTN medications: unsure of prior   HTN crises:  No prior hospitalization or crises   Lifestyle factors:     High salt: Yes, Details: knows to keep Na low    EtOH: No  Interfering substances:  None     Clinical evidence of ASCVD:     1) hx of MI/ACS:  Yes, Details: s/p CABG  (Dr. Love)   2) coronary or other revascularization procedure: PCI with stenting    Major RFs:     1) Age (Men>44, women>54):  yes   2) Fhx early CAD (<55 men, <65 women):  no   3) cigarette smoking:  Yes, Details: former, quit    4) high BP >139/89 or on tx: yes   5) Low HDL-C (<40 men, <50 women):  no  Current Outpatient Medications on File Prior to Visit   Medication Sig Dispense Refill   • pantoprazole (PROTONIX) 40 MG Tablet Delayed Response Take 1 tablet by mouth once daily 90 Tab 2   • trandolapril (MAVIK) 4 MG  Tab Take 1 Tab by mouth every day. Take in evening 90 Tab 1   • metoprolol SR (TOPROL XL) 25 MG TABLET SR 24 HR Take 1/2 tab daily by mouth 30 Tab 1   • clopidogrel (PLAVIX) 75 MG Tab Take 1 Tab by mouth every day for 360 days. 90 Tab 3   • doxazosin (CARDURA) 2 MG Tab Take 0.5 Tabs by mouth every bedtime for 360 days. Take before bed 45 Tab 3   • Eplerenone 50 MG Tab Take 1 Tab by mouth 2 Times a Day for 360 days. 180 Tab 3   • Metformin HCl 500 MG TABLET SR 24 HR Take 1 Tab by mouth every day for 360 days. 90 Tab 3   • amLODIPine (NORVASC) 10 MG Tab Take 1 Tab by mouth every day for 360 days. 90 Tab 3   • Alirocumab 150 MG/ML Solution Pen-injector Inject 1 Dose as instructed every 14 days for 360 days. Instructions: inject contents of one pen subcutaneously every 14 days as instructed. 6 PEN 3   • cholecalciferol (VITAMIN D3) 400 UNIT Tab Take 400 Units by mouth every day.     • acarbose (PRECOSE) 50 MG Tab TAKE ONE TABLET BY MOUTH TWICE DAILY 180 Tab 3   • doxycycline (VIBRAMYCIN) 100 MG Tab Take 1 Tab by mouth 2 times a day. 10 Tab 0   • therapeutic multivitamin-minerals (THERAGRAN-M) Tab Take 1 Tab by mouth every day.     • testosterone cypionate (DEPO-TESTOSTERONE) 200 MG/ML Solution injection 1 mL by Intramuscular route every 21 days. 1 mL 11     No current facility-administered medications on file prior to visit.         Social History:     Social History     Tobacco Use   • Smoking status: Former Smoker     Packs/day: 2.00     Years: 25.00     Pack years: 50.00     Types: Cigarettes     Quit date: 1987     Years since quittin.7   • Smokeless tobacco: Never Used   Substance Use Topics   • Alcohol use: Yes     Alcohol/week: 0.0 oz     Comment: a few times a year   • Drug use: No      Allergies   Allergen Reactions   • Naproxen Anaphylaxis   • Nsaids Anaphylaxis   • Codeine      Nausea    • Morphine Nausea   • Sulfa Drugs Nausea   • Rosuvastatin Calcium Myalgia        Review of Systems:   Review of  "Systems   Constitutional: Positive for malaise/fatigue. Negative for chills and fever.   HENT: Negative for nosebleeds, sore throat and tinnitus.    Eyes: Negative for blurred vision and double vision.   Respiratory: Negative for cough, hemoptysis, shortness of breath and wheezing.    Cardiovascular: Negative for chest pain, palpitations, orthopnea and leg swelling.   Gastrointestinal: Positive for nausea. Negative for abdominal pain, blood in stool, diarrhea, melena and vomiting.   Genitourinary: Negative for hematuria.   Musculoskeletal: Negative for joint pain and myalgias.   Skin: Negative for itching and rash.   Neurological: Positive for dizziness. Negative for tremors, focal weakness, seizures, weakness and headaches.   Endo/Heme/Allergies: Does not bruise/bleed easily.   Psychiatric/Behavioral: Negative for depression. The patient is not nervous/anxious and does not have insomnia.         Objective:     Vitals:    09/10/20 0959 09/10/20 1009   BP: 131/61 128/63   BP Location: Left arm Left arm   Patient Position: Sitting Sitting   BP Cuff Size: Adult Adult   Pulse: (!) 53 (!) 52   Weight: 95.3 kg (210 lb 1.6 oz)    Height: 1.689 m (5' 6.5\")      BP Readings from Last 5 Encounters:   09/10/20 128/63   07/08/20 130/66   03/05/20 127/59   02/28/20 124/62   01/20/20 132/63      Body mass index is 33.4 kg/m².    Physical Exam   Constitutional: He is oriented to person, place, and time. He appears well-developed and well-nourished. No distress.   Neck: No JVD present.   Cardiovascular: Normal rate, regular rhythm, normal heart sounds and intact distal pulses. Exam reveals no gallop and no friction rub.   No murmur heard.  Pulmonary/Chest: Effort normal and breath sounds normal. No respiratory distress. He has no wheezes. He has no rales.   Musculoskeletal:         General: No tenderness or edema.   Neurological: He is alert and oriented to person, place, and time. Coordination normal.   Skin: Skin is warm and dry. " No rash noted. He is not diaphoretic. No erythema. No pallor.   Psychiatric: He has a normal mood and affect. His behavior is normal.        Accessory Clinical Findings:   Echocardiogram:  Results for orders placed or performed during the hospital encounter of 09/26/17   ECHOCARDIOGRAM COMP W/O CONT   Result Value Ref Range    Eject.Frac. MOD BP 57.79     Eject.Frac. MOD 4C 56.25     Eject.Frac. MOD 2C 62.39       Lab Results   Component Value Date    CHOLSTRLTOT 78 (L) 08/10/2020    LDL 3 08/10/2020    HDL 33.0 (L) 08/10/2020    TRIGLYCERIDE 211 (H) 08/10/2020         Lab Results   Component Value Date    HBA1C 5.7 (H) 08/10/2020      Lab Results   Component Value Date    SODIUM 139 08/10/2020    POTASSIUM 4.1 08/10/2020    CHLORIDE 104 08/10/2020    CO2 25 08/10/2020    GLUCOSE 121 (H) 08/10/2020    BUN 12 08/10/2020    CREATININE 1.3 08/10/2020      Lab Results   Component Value Date    ALDOSTERONE 28 07/01/2019      Lab Results   Component Value Date    URCREAT 148 08/12/2020    MICROALBUR 7.1 08/12/2020    MALBCRT 5 08/12/2020        Ref. Range 7/1/2019 13:20 7/23/2019 07:25 8/26/2019 11:05   CPK Total Latest Ref Range: 39 - 308 U/L 453 (H) 170 240      Ref. Range 11/20/2019 07:15 2/25/2020 07:00   LDL Direct Latest Ref Range: <100 mg/dL 130 (H) 25       VASCULAR IMAGING:     Echo 2017  No prior study is available for comparison.   Left ventricular ejection fraction is visually estimated to be 60%.   Normal diastolic function. Poor endocardial definition difficult to   assess wall motion.  No evidence of valvular abnormality based on Doppler evaluation.   Estimated right ventricular systolic pressure  is 25 mmHg.    Echo 7/25/19  Normal left ventricular systolic function. Left ventricular ejection   fraction is visually estimated to be 55%.   Normal diastolic function.    Renal art duplex 7/31/19  Unremarkable renal sonogram with no evidence of renal artery stenosis as demonstrated above      Javad Childs,  M.D.

## 2020-09-13 DIAGNOSIS — E78.5 DYSLIPIDEMIA: ICD-10-CM

## 2020-09-15 RX ORDER — EZETIMIBE 10 MG/1
TABLET ORAL
Qty: 90 TAB | Refills: 0 | OUTPATIENT
Start: 2020-09-15

## 2020-10-04 DIAGNOSIS — I1A.0 RESISTANT HYPERTENSION: ICD-10-CM

## 2020-10-05 RX ORDER — METOPROLOL SUCCINATE 25 MG/1
TABLET, EXTENDED RELEASE ORAL
Qty: 30 TAB | Refills: 0 | Status: SHIPPED | OUTPATIENT
Start: 2020-10-05 | End: 2020-11-30

## 2020-11-29 DIAGNOSIS — I1A.0 RESISTANT HYPERTENSION: ICD-10-CM

## 2020-11-30 RX ORDER — METOPROLOL SUCCINATE 25 MG/1
TABLET, EXTENDED RELEASE ORAL
Qty: 30 TAB | Refills: 0 | Status: SHIPPED | OUTPATIENT
Start: 2020-11-30 | End: 2020-12-17

## 2020-12-17 ENCOUNTER — OFFICE VISIT (OUTPATIENT)
Dept: VASCULAR LAB | Facility: MEDICAL CENTER | Age: 75
End: 2020-12-17
Attending: FAMILY MEDICINE
Payer: COMMERCIAL

## 2020-12-17 VITALS
WEIGHT: 213 LBS | SYSTOLIC BLOOD PRESSURE: 123 MMHG | HEIGHT: 66 IN | HEART RATE: 63 BPM | DIASTOLIC BLOOD PRESSURE: 52 MMHG | BODY MASS INDEX: 34.23 KG/M2

## 2020-12-17 DIAGNOSIS — Z79.02 LONG TERM (CURRENT) USE OF ANTITHROMBOTICS/ANTIPLATELETS: ICD-10-CM

## 2020-12-17 DIAGNOSIS — E78.1 HYPERTRIGLYCERIDEMIA: ICD-10-CM

## 2020-12-17 DIAGNOSIS — E11.9 DIET-CONTROLLED TYPE 2 DIABETES MELLITUS (HCC): ICD-10-CM

## 2020-12-17 DIAGNOSIS — Z95.1 HX OF CABG: ICD-10-CM

## 2020-12-17 DIAGNOSIS — G47.33 OBSTRUCTIVE SLEEP APNEA SYNDROME: ICD-10-CM

## 2020-12-17 DIAGNOSIS — E78.5 DYSLIPIDEMIA: ICD-10-CM

## 2020-12-17 DIAGNOSIS — N52.9 ERECTILE DYSFUNCTION, UNSPECIFIED ERECTILE DYSFUNCTION TYPE: ICD-10-CM

## 2020-12-17 DIAGNOSIS — I1A.0 RESISTANT HYPERTENSION: ICD-10-CM

## 2020-12-17 DIAGNOSIS — I25.10 CORONARY ARTERY DISEASE INVOLVING NATIVE HEART WITHOUT ANGINA PECTORIS, UNSPECIFIED VESSEL OR LESION TYPE: ICD-10-CM

## 2020-12-17 PROCEDURE — 99214 OFFICE O/P EST MOD 30 MIN: CPT | Performed by: FAMILY MEDICINE

## 2020-12-17 RX ORDER — ICOSAPENT ETHYL 1000 MG/1
2 CAPSULE ORAL 2 TIMES DAILY
Qty: 360 CAP | Refills: 3 | Status: SHIPPED | OUTPATIENT
Start: 2020-12-17 | End: 2021-06-10

## 2020-12-17 ASSESSMENT — ENCOUNTER SYMPTOMS
FEVER: 0
TREMORS: 0
DOUBLE VISION: 0
ORTHOPNEA: 0
SHORTNESS OF BREATH: 0
NERVOUS/ANXIOUS: 0
VOMITING: 0
FOCAL WEAKNESS: 0
DIARRHEA: 0
DIZZINESS: 1
COUGH: 0
HEMOPTYSIS: 0
HEADACHES: 0
WEAKNESS: 0
SEIZURES: 0
MYALGIAS: 0
SORE THROAT: 0
DEPRESSION: 0
PALPITATIONS: 0
BLOOD IN STOOL: 0
NAUSEA: 0
ABDOMINAL PAIN: 0
WHEEZING: 0
INSOMNIA: 0
CHILLS: 0
BLURRED VISION: 0
BRUISES/BLEEDS EASILY: 0

## 2020-12-17 ASSESSMENT — FIBROSIS 4 INDEX: FIB4 SCORE: 1.62

## 2020-12-17 NOTE — PROGRESS NOTES
[facility] - Follow-up   12/17/20     Assessment / Plan:   1. Blood Pressure Control:  Qualifies for resistant HTN due to 4 meds w/o control   ACC/AHA (2017) goal <130/80, consider less stringent goals based upon pt preference and polypharmacy potential, though <140/80 at the most    Home BP at goal:  No, approaching goal    Office BP at goal:  Yes    24h ABPM (12/2019): Mean daytime: 146/75 consistent with poorly controlled out of office  systolic blood pressure. Nocturnal dip: Appears normal   Device candidate? No    Plan:   Monitoring:   - continue home BP monitoring, reviewed correct technique:  Yes   - order 24h ABPM:  Ordered today   - monitor lytes/gfr routinely     2. Work up of Secondary Causes of Resistant Hypertension:   Renovascular HTN: Excluded  Primary Aldosteronism: Excluded, high renin  Thyroid Function: Excluded  Obstructive Sleep Apnea: using BiPAP, reports good compliance, pending with pulm MD in 2-3 weeks, minimal good nights sleep   Pheochromocytoma: Not Yet Assessed  Other: none  Recommendations At This Visit:        3. Medication Use / Adherence:  Assessment: Complete  Recommendations: Instructed to Continue Taking All Medications As Prescribed         4. End Organ Damage:   Left Ventricular Hypertrophy: Absent on  Echocardiogram Date: 2017  Albuminuria: Microalbuminuria, level A2 on Date: 10/2018  Renal Function: Chronic Kidney Disease  Stage 3a   - avoid nephrotoxins  - continue HTN control with RAAS blockade   - monitor lytes/gfr routinely  - eval with nephrology if worsening over time    Established Cardiovascular Disease: Present: hx of CAD, s/p CABG - see below     5. Lifestyle Recommendations:  Smoking: continued complete avoidance of all tobacco products     Physical Activity: continue healthy activity to improve CV fitness, see care instructions for additional details     Weight Management and Nutrition: Dietary plan was discussed with patient at this visit including DASH,  low sodium and/or as outlined in care instructions     6. Standard HTN Pharmacotherapy:  ACEI/ARB: continue trandolapril 4mg QHS for true 24h coverage, consider dose reduction due to ? ED  Thiazide Type Diuretic: stop due to worsening ED  Calcium Channel Blocker (CCB): continue amlodipine 5mg BID, vasodilatory edema should be balanced by eplerenone    7. Additional Agents:  Aldosterone Antagonist: eplerenone 50mg once BID, consider reduction to 25mg BID in future   Loop Diuretic: none   Peripheral Alpha Blocker: continue doxazosin 2mg to 1/2 tab (1mg) at bedtime   Other CCB: none  Direct Vasodilator: none  Centrally Acting Alpha Agonist: prefer to avoid clonidine due to rebound HTN effects, will consider prazosin if BP spikes start occurring   Other:   BB: stop metoprolol due to positional dizz, lowHR      8. Other CV Risk Factors:     Lipid management:   Secondary prevention   Myalgias on rosuva and 1/2 dose pravastatin. Has failed simva, atorva, prava.    PSCK9i indicated per 2018 ACC/AHA guidelines in this patient with established ASCVD whose LDL-C remains above threshold of 70 mg/dl despite maximally tolerated statin therapy.  Praluent tolerated, PA approved for 2020. Excellent response to pcsk9i  Stopped zetia as LDL at goal with pcsk9i alone  Meets criteria for Vascepa based upon hx of CAD, s/p CABG, T2D, trigs >135 already on max-tolerated statin (unable to tolerate) - will further reduce secondary outcomes   NLA/ACC/AHA Risk Category:  Very high:  Evidence of ASCVD and T2D   Tx goal: non-HDL-C <100,  LDL-C <70  (optional: apoB <80)   At goal:  Yes, LDLc 25  Plan:  - reinforced ongoing TLC measures   - continue praluent 150mg daily  - start vascepa 2g BID   - update labs 3mo, if stable, then Q6mo - track direct LDLc due to very low calc LDLc   - continue vitamin D3 7619-1464 daily     DM: type 2, non-insulin  Lab Results   Component Value Date    HBA1C 6.2 (H) 12/04/2020     Goal A1c <7.0%   ACR: abnormal:  A2  invokana too expensive   Does not seem to be greg Metformin more that once daily  Plan:  - continue acarbose for now  - metformin 500 mg Qpm due to SE unable to start ER form.     - holding Invokana 100 mg for now due to expense, consider restart if A1c elevates switch to Jardiance or Farxiga if cost is better - pt to call us   - consider glp1RA weekly as 3rd agent   - recommmend for routine care with PCP (or endocrine) to include regular A1c monitoring, annual albumin/creatinine ratio (ACR), annual diabetic retinopathy screening, foot exams, annual flu vaccine, and updates to pneumonia vaccines as appropriate     Smoking: ended year 1987 or pack years of use 25    ANTITHROMBOTIC THERAPY:   Unable to afford xarelto 2.5mg BID, so will stop ASA + xarelto  Due to slight advantage of plavix over ASA, recommend plavix for monotherapy.  - continue plavix 75mg daily     9. Other Issues:    - Afib - hx of paroxysmal Afib post-op CABG.  Stable   No current, has been off BB and xarelto in 2013.   No recurrence, so low risk for CVA, no OAC recommended at this time.    - Continue monitoring per cardiology     - hypogonadism, erectile dysfunction, stable and improved off thiazide and spironolactone.  Pt aware of adverse effects of testosterone on lipids    - CHOCO - stable, continue f/u with sleep MD     - CAD - s/p CABG, 12/2013, still in high risk stage <10yrs from operation, stable    - continue aggressive med mgmt, qualifies for pcsk9i  - ongoing care with cardiology     Studies Ordered At This Visit:  None   Blood Work to Be Obtained Prior to Next Visit:  As noted   Disposition: 6mo, sooner prn     Diagnosis:  1. Resistant hypertension     2. Dyslipidemia  Icosapent Ethyl (VASCEPA) 1 g Cap    Comp Metabolic Panel    Lipid Profile    LDL, DIRECT    APOLIPOPROTEIN B   3. Coronary artery disease involving native heart without angina pectoris, unspecified vessel or lesion type  Icosapent Ethyl (VASCEPA) 1 g Cap   4. Type 2  diabetes mellitus without complications, without long-term current use of insulin  Icosapent Ethyl (VASCEPA) 1 g Cap   5. Hypertriglyceridemia  Icosapent Ethyl (VASCEPA) 1 g Cap    Comp Metabolic Panel    Lipid Profile    LDL, DIRECT    APOLIPOPROTEIN B   6. Long term (current) use of antithrombotics/antiplatelets     7. Obstructive sleep apnea syndrome     8. Hx of CABG  Icosapent Ethyl (VASCEPA) 1 g Cap    Comp Metabolic Panel    Lipid Profile    LDL, DIRECT    APOLIPOPROTEIN B   9. Erectile dysfunction, unspecified erectile dysfunction type          History of Present Illness:   Wang Forman Jr. is a male seen for evaluation of resistant HTN and management.   Wang Forman Jr. is referred by:cardiologist Scott Cameron MD    HTN:  Current HTN concerns:  Reports ongoing fatigue and dizz with positional changes.  No vertigo.  No other focal neuro s/s.    No major changes to dietary patterns or physical activity   Current ADRs: none   Home BP lo-140s/60-70s  HR low 60s   24h ABPM completed: not tested  Adherence to current HTN meds: compliant all of the time     Antiplatelet/anticoagulation:   Plavix, tolerating w/o bleeding   No hx of Afib since     Hyperlipidemia:    Interval hx/concerns:  Wondering about very low LDLc numbers   Current treatment: zetia, Praluent   Myalgias? no  Other adverse drug reactions? No  Lipid profile: as noted below       T2D:  Stable. No current symptoms reported.   Tolerating meds and no recent med changes.   Home blood sugars stable, reports no lows.   Last A1c   Lab Results   Component Value Date    HBA1C 6.2 (H) 2020     CHOCO:   Still daily somnolence and reduced energy. feels poorly rested daily, currently on Bipap, seening pulm MD.    Pertinent HTN hx (reviewed at initial visit):   Age at HTN dx: >10yrs  Past HTN medications: unsure of prior   HTN crises:  No prior hospitalization or crises   Lifestyle factors:   High salt: Yes, Details:  knows to keep Na low EtOH: No  Interfering substances:  None     Clinical evidence of ASCVD:     1) hx of MI/ACS:  Yes, Details: s/p CABG 2013 (Dr. Love)   2) coronary or other revascularization procedure: PCI with stenting    Current Outpatient Medications on File Prior to Visit   Medication Sig Dispense Refill   • acarbose (PRECOSE) 50 MG Tab Take 1 tablet by mouth twice daily 180 Tab 0   • pantoprazole (PROTONIX) 40 MG Tablet Delayed Response Take 1 tablet by mouth once daily 90 Tab 2   • trandolapril (MAVIK) 4 MG Tab Take 1 Tab by mouth every day. Take in evening 90 Tab 1   • clopidogrel (PLAVIX) 75 MG Tab Take 1 Tab by mouth every day for 360 days. 90 Tab 3   • doxazosin (CARDURA) 2 MG Tab Take 0.5 Tabs by mouth every bedtime for 360 days. Take before bed 45 Tab 3   • Eplerenone 50 MG Tab Take 1 Tab by mouth 2 Times a Day for 360 days. 180 Tab 3   • Metformin HCl 500 MG TABLET SR 24 HR Take 1 Tab by mouth every day for 360 days. 90 Tab 3   • amLODIPine (NORVASC) 10 MG Tab Take 1 Tab by mouth every day for 360 days. 90 Tab 3   • Alirocumab 150 MG/ML Solution Pen-injector Inject 1 Dose as instructed every 14 days for 360 days. Instructions: inject contents of one pen subcutaneously every 14 days as instructed. 6 PEN 3   • cholecalciferol (VITAMIN D3) 400 UNIT Tab Take 400 Units by mouth every day.     • doxycycline (VIBRAMYCIN) 100 MG Tab Take 1 Tab by mouth 2 times a day. 10 Tab 0   • therapeutic multivitamin-minerals (THERAGRAN-M) Tab Take 1 Tab by mouth every day.     • testosterone cypionate (DEPO-TESTOSTERONE) 200 MG/ML Solution injection 1 mL by Intramuscular route every 21 days. 1 mL 11     Current Facility-Administered Medications on File Prior to Visit   Medication Dose Route Frequency Provider Last Rate Last Admin   • testosterone cypionate (DEPO-TESTOSTERONE) injection 200 mg  200 mg Intramuscular Q30 DAYS Mohan Kurtz M.D.   200 mg at 12/04/20 0930        Social History:     Social History  "    Tobacco Use   • Smoking status: Former Smoker     Packs/day: 2.00     Years: 25.00     Pack years: 50.00     Types: Cigarettes     Quit date: 1987     Years since quittin.9   • Smokeless tobacco: Never Used   Substance Use Topics   • Alcohol use: Not Currently     Alcohol/week: 0.0 oz     Frequency: Monthly or less     Comment: a few times a year   • Drug use: No      Allergies   Allergen Reactions   • Naproxen Anaphylaxis   • Nsaids Anaphylaxis   • Codeine      Nausea    • Morphine Nausea   • Sulfa Drugs Nausea   • Rosuvastatin Calcium Myalgia        Review of Systems:   Review of Systems   Constitutional: Positive for malaise/fatigue. Negative for chills and fever.   HENT: Negative for nosebleeds, sore throat and tinnitus.    Eyes: Negative for blurred vision and double vision.   Respiratory: Negative for cough, hemoptysis, shortness of breath and wheezing.    Cardiovascular: Negative for chest pain, palpitations, orthopnea and leg swelling.   Gastrointestinal: Negative for abdominal pain, blood in stool, diarrhea, melena, nausea and vomiting.   Genitourinary: Negative for hematuria.   Musculoskeletal: Negative for joint pain and myalgias.   Skin: Negative for itching and rash.   Neurological: Positive for dizziness (with positional changes ). Negative for tremors, focal weakness, seizures, weakness and headaches.   Endo/Heme/Allergies: Does not bruise/bleed easily.   Psychiatric/Behavioral: Negative for depression. The patient is not nervous/anxious and does not have insomnia.         Objective:     Vitals:    20 1116   BP: 123/52   BP Location: Left arm   Patient Position: Sitting   BP Cuff Size: Adult   Pulse: 63   Weight: 96.6 kg (213 lb)   Height: 1.676 m (5' 6\")     BP Readings from Last 5 Encounters:   20 123/52   20 138/66   09/10/20 128/63   20 130/66   20 127/59      Body mass index is 34.38 kg/m².    Physical Exam   Constitutional: He is oriented to person, " place, and time. He appears well-developed and well-nourished. No distress.   Neck: No JVD present.   Cardiovascular: Normal rate, regular rhythm, normal heart sounds and intact distal pulses. Exam reveals no gallop and no friction rub.   No murmur heard.  Pulmonary/Chest: Effort normal and breath sounds normal. No respiratory distress. He has no wheezes. He has no rales.   Musculoskeletal:         General: No tenderness or edema.   Neurological: He is alert and oriented to person, place, and time. Coordination normal.   Skin: Skin is warm and dry. No rash noted. He is not diaphoretic. No erythema. No pallor.   Psychiatric: He has a normal mood and affect. His behavior is normal.        Accessory Clinical Findings:   Echocardiogram:  Results for orders placed or performed during the hospital encounter of 09/26/17   ECHOCARDIOGRAM COMP W/O CONT   Result Value Ref Range    Eject.Frac. MOD BP 57.79     Eject.Frac. MOD 4C 56.25     Eject.Frac. MOD 2C 62.39       Lab Results   Component Value Date    CHOLSTRLTOT 78 (L) 08/10/2020    LDL 3 08/10/2020    HDL 33.0 (L) 08/10/2020    TRIGLYCERIDE 211 (H) 08/10/2020            Ref. Range 12/4/2020 07:15   LDL Direct Latest Ref Range: <100 mg/dL 46       Lab Results   Component Value Date    HBA1C 6.2 (H) 12/04/2020      Lab Results   Component Value Date    SODIUM 137 12/04/2020    POTASSIUM 4.5 12/04/2020    CHLORIDE 103 12/04/2020    CO2 23 12/04/2020    GLUCOSE 131 (H) 12/04/2020    BUN 15 12/04/2020    CREATININE 1.3 12/04/2020      Lab Results   Component Value Date    ALDOSTERONE 28 07/01/2019      Lab Results   Component Value Date    URCREAT 148 08/12/2020    MICROALBUR 7.1 08/12/2020    MALBCRT 5 08/12/2020        Ref. Range 7/1/2019 13:20 7/23/2019 07:25 8/26/2019 11:05   CPK Total Latest Ref Range: 39 - 308 U/L 453 (H) 170 240       VASCULAR IMAGING:     Echo 2017  No prior study is available for comparison.   Left ventricular ejection fraction is visually estimated  to be 60%.   Normal diastolic function. Poor endocardial definition difficult to   assess wall motion.  No evidence of valvular abnormality based on Doppler evaluation.   Estimated right ventricular systolic pressure  is 25 mmHg.    Echo 7/25/19  Normal left ventricular systolic function. Left ventricular ejection   fraction is visually estimated to be 55%.   Normal diastolic function.    Renal art duplex 7/31/19  Unremarkable renal sonogram with no evidence of renal artery stenosis as demonstrated above      Javad Childs M.D.   [facility]   [facility]

## 2020-12-18 DIAGNOSIS — E78.5 DYSLIPIDEMIA: ICD-10-CM

## 2020-12-18 RX ORDER — ALIROCUMAB 150 MG/ML
INJECTION, SOLUTION SUBCUTANEOUS
Qty: 6 ML | Refills: 0 | Status: SHIPPED | OUTPATIENT
Start: 2020-12-18 | End: 2021-03-19

## 2020-12-24 DIAGNOSIS — I1A.0 RESISTANT HYPERTENSION: ICD-10-CM

## 2020-12-28 RX ORDER — TRANDOLAPRIL TABLETS 4 MG/1
TABLET ORAL
Qty: 90 TAB | Refills: 1 | Status: SHIPPED | OUTPATIENT
Start: 2020-12-28 | End: 2021-06-23

## 2021-01-04 DIAGNOSIS — I1A.0 RESISTANT HYPERTENSION: ICD-10-CM

## 2021-01-04 RX ORDER — EPLERENONE 50 MG/1
TABLET, FILM COATED ORAL
Qty: 180 TAB | Refills: 0 | Status: SHIPPED | OUTPATIENT
Start: 2021-01-04 | End: 2021-04-06

## 2021-01-13 DIAGNOSIS — Z23 NEED FOR VACCINATION: ICD-10-CM

## 2021-03-19 DIAGNOSIS — E78.5 DYSLIPIDEMIA: ICD-10-CM

## 2021-03-19 RX ORDER — ALIROCUMAB 150 MG/ML
INJECTION, SOLUTION SUBCUTANEOUS
Qty: 6 ML | Refills: 1 | Status: SHIPPED | OUTPATIENT
Start: 2021-03-19 | End: 2021-03-23

## 2021-03-23 DIAGNOSIS — E78.5 DYSLIPIDEMIA: ICD-10-CM

## 2021-03-23 RX ORDER — ALIROCUMAB 150 MG/ML
INJECTION, SOLUTION SUBCUTANEOUS
Qty: 6 ML | Refills: 1 | Status: SHIPPED | OUTPATIENT
Start: 2021-03-23 | End: 2021-03-26

## 2021-03-25 DIAGNOSIS — E78.5 DYSLIPIDEMIA: ICD-10-CM

## 2021-03-26 ENCOUNTER — TELEPHONE (OUTPATIENT)
Dept: VASCULAR LAB | Facility: MEDICAL CENTER | Age: 76
End: 2021-03-26

## 2021-03-26 RX ORDER — ALIROCUMAB 150 MG/ML
INJECTION, SOLUTION SUBCUTANEOUS
Qty: 6 ML | Refills: 0 | Status: SHIPPED
Start: 2021-03-26 | End: 2021-03-31

## 2021-03-26 NOTE — TELEPHONE ENCOUNTER
Releasing Praluent 150mg Auto Injector to [id number]. I have contacted the patient and he is aware.Releasing Praluent 150mg Auto Injector to [id number]. I have contacted the patient and he is aware.

## 2021-03-30 DIAGNOSIS — E78.5 DYSLIPIDEMIA: ICD-10-CM

## 2021-03-31 DIAGNOSIS — E78.5 DYSLIPIDEMIA: ICD-10-CM

## 2021-03-31 RX ORDER — ALIROCUMAB 150 MG/ML
INJECTION, SOLUTION SUBCUTANEOUS
Qty: 6 ML | Refills: 0 | Status: SHIPPED | OUTPATIENT
Start: 2021-03-31 | End: 2021-03-31 | Stop reason: SDUPTHER

## 2021-03-31 RX ORDER — ALIROCUMAB 150 MG/ML
INJECTION, SOLUTION SUBCUTANEOUS
Qty: 6 ML | Refills: 0 | Status: SHIPPED | OUTPATIENT
Start: 2021-03-31 | End: 2021-06-28

## 2021-04-05 DIAGNOSIS — I1A.0 RESISTANT HYPERTENSION: ICD-10-CM

## 2021-04-06 RX ORDER — EPLERENONE 50 MG/1
TABLET, FILM COATED ORAL
Qty: 180 TABLET | Refills: 1 | Status: SHIPPED | OUTPATIENT
Start: 2021-04-06 | End: 2021-10-11

## 2021-04-28 DIAGNOSIS — I10 ESSENTIAL HYPERTENSION: ICD-10-CM

## 2021-04-28 RX ORDER — DOXAZOSIN 2 MG/1
TABLET ORAL
Qty: 90 TABLET | Refills: 0 | Status: SHIPPED | OUTPATIENT
Start: 2021-04-28 | End: 2021-10-25

## 2021-05-05 DIAGNOSIS — E11.9 DIET-CONTROLLED TYPE 2 DIABETES MELLITUS (HCC): ICD-10-CM

## 2021-06-10 ENCOUNTER — OFFICE VISIT (OUTPATIENT)
Dept: VASCULAR LAB | Facility: MEDICAL CENTER | Age: 76
End: 2021-06-10
Attending: FAMILY MEDICINE
Payer: COMMERCIAL

## 2021-06-10 VITALS
HEART RATE: 77 BPM | BODY MASS INDEX: 34.53 KG/M2 | DIASTOLIC BLOOD PRESSURE: 70 MMHG | HEIGHT: 67 IN | SYSTOLIC BLOOD PRESSURE: 149 MMHG | WEIGHT: 220 LBS

## 2021-06-10 DIAGNOSIS — Z95.1 HX OF CABG: ICD-10-CM

## 2021-06-10 DIAGNOSIS — E78.5 DYSLIPIDEMIA: ICD-10-CM

## 2021-06-10 DIAGNOSIS — I48.0 PAROXYSMAL ATRIAL FIBRILLATION (HCC): ICD-10-CM

## 2021-06-10 DIAGNOSIS — I25.10 CORONARY ARTERY DISEASE INVOLVING NATIVE HEART WITHOUT ANGINA PECTORIS, UNSPECIFIED VESSEL OR LESION TYPE: ICD-10-CM

## 2021-06-10 DIAGNOSIS — E11.9 TYPE 2 DIABETES MELLITUS WITHOUT COMPLICATION, WITHOUT LONG-TERM CURRENT USE OF INSULIN (HCC): ICD-10-CM

## 2021-06-10 DIAGNOSIS — I1A.0 RESISTANT HYPERTENSION: ICD-10-CM

## 2021-06-10 DIAGNOSIS — Z79.01 CHRONIC ANTICOAGULATION: ICD-10-CM

## 2021-06-10 DIAGNOSIS — Z79.02 LONG TERM (CURRENT) USE OF ANTITHROMBOTICS/ANTIPLATELETS: ICD-10-CM

## 2021-06-10 DIAGNOSIS — G47.33 OBSTRUCTIVE SLEEP APNEA SYNDROME: ICD-10-CM

## 2021-06-10 DIAGNOSIS — E78.1 HYPERTRIGLYCERIDEMIA: ICD-10-CM

## 2021-06-10 DIAGNOSIS — N52.9 ERECTILE DYSFUNCTION, UNSPECIFIED ERECTILE DYSFUNCTION TYPE: ICD-10-CM

## 2021-06-10 PROCEDURE — 99214 OFFICE O/P EST MOD 30 MIN: CPT | Performed by: FAMILY MEDICINE

## 2021-06-10 PROCEDURE — 99212 OFFICE O/P EST SF 10 MIN: CPT

## 2021-06-10 RX ORDER — AMLODIPINE BESYLATE 5 MG/1
5 TABLET ORAL
Qty: 90 TABLET | Refills: 3 | Status: SHIPPED | OUTPATIENT
Start: 2021-06-10 | End: 2022-07-18

## 2021-06-10 RX ORDER — DILTIAZEM HYDROCHLORIDE 60 MG/1
60 TABLET, FILM COATED ORAL
COMMUNITY
Start: 2021-04-15 | End: 2021-06-10

## 2021-06-10 RX ORDER — DILTIAZEM HYDROCHLORIDE 180 MG/1
180 CAPSULE, EXTENDED RELEASE ORAL DAILY
Qty: 90 CAPSULE | Refills: 3 | Status: SHIPPED | OUTPATIENT
Start: 2021-06-10 | End: 2022-06-01

## 2021-06-10 ASSESSMENT — ENCOUNTER SYMPTOMS
NAUSEA: 0
DIARRHEA: 0
SHORTNESS OF BREATH: 0
BLOOD IN STOOL: 0
SEIZURES: 0
SORE THROAT: 0
HEMOPTYSIS: 0
HEADACHES: 0
ORTHOPNEA: 0
COUGH: 0
WEAKNESS: 0
DIZZINESS: 1
BRUISES/BLEEDS EASILY: 0
DOUBLE VISION: 0
VOMITING: 0
FOCAL WEAKNESS: 0
INSOMNIA: 0
PALPITATIONS: 0
FEVER: 0
MYALGIAS: 0
ABDOMINAL PAIN: 0
TREMORS: 0
WHEEZING: 0
CHILLS: 0
DEPRESSION: 0
NERVOUS/ANXIOUS: 0
BLURRED VISION: 0

## 2021-06-10 ASSESSMENT — FIBROSIS 4 INDEX: FIB4 SCORE: 1.91

## 2021-06-10 NOTE — PROGRESS NOTES
[facility] - Follow-up   06/10/21     Assessment / Plan:   1. Blood Pressure Control:  Qualifies for resistant HTN due to 4 meds w/o control   ACC/AHA (2017) goal <130/80, consider less stringent goals based upon pt preference and polypharmacy potential, though <140/80 at the most    Home BP at goal:  No, approaching goal    Office BP at goal: no, isolated syst HTN    24h ABPM (12/2019): Mean daytime: 146/75 consistent with poorly controlled out of office  systolic blood pressure. Nocturnal dip: Appears normal   Device candidate? No    Plan:   Monitoring:   - continue home BP monitoring, reviewed correct technique:  Yes   - order 24h ABPM:  Ordered today   - monitor lytes/gfr routinely     2. Work up of Secondary Causes of Resistant Hypertension:   Renovascular HTN: Excluded  Primary Aldosteronism: Excluded, high renin  Thyroid Function: Excluded  Obstructive Sleep Apnea: using BiPAP, reports good compliance, pending with pulm MD in 2-3 weeks, minimal good nights sleep   Pheochromocytoma: Not Yet Assessed  Other: none  Recommendations At This Visit:        3. Medication Use / Adherence:  Assessment: Complete  Recommendations: Instructed to Continue Taking All Medications As Prescribed         4. End Organ Damage:   Left Ventricular Hypertrophy: Absent on  Echocardiogram Date: 2017  Albuminuria: Microalbuminuria, level A2 on Date: 10/2018  Renal Function: Chronic Kidney Disease  Stage 3a   - avoid nephrotoxins  - continue HTN control with RAAS blockade   - monitor lytes/gfr routinely  - eval with nephrology if worsening over time    Established Cardiovascular Disease:    1) CAD - s/p CABG, 12/2013, stable  still in high risk stage <10yrs from operation   - continue aggressive med mgmt, qualifies for pcsk9i  - ongoing care with cardiology    5. Lifestyle Recommendations:  Smoking: continued complete avoidance of all tobacco products     Physical Activity: continue healthy activity to improve CV fitness, see  care instructions for additional details     Weight Management and Nutrition: Dietary plan was discussed with patient at this visit including DASH, low sodium and/or as outlined in care instructions     6. Standard HTN Pharmacotherapy:  ACEI/ARB: continue trandolapril 4mg QHS for true 24h coverage, consider dose reduction due to ? ED    Thiazide Type Diuretic: stop due to worsening ED  Calcium Channel Blocker (CCB): continue amlodipine 5mg daily    7. Additional Agents:  Aldosterone Antagonist: eplerenone 50mg BID, consider reduction to 25mg BID in future   Loop Diuretic: none   Peripheral Alpha Blocker: continue doxazosin 2mg to 1/2 tab (1mg) at bedtime   Other CCB: change dilt 60mg TID to diltiazem 180mg ER daily to reduce fatigue while providing rate-control for Afib   Direct Vasodilator: none  Centrally Acting Alpha Agonist: prefer to avoid clonidine due to rebound HTN effects, will consider prazosin if BP spikes start occurring   Other:   BB: stop metoprolol due to positional dizz, lowHR      8. Other CV Risk Factors:     Lipid management:   Secondary prevention   -Myalgias on rosuva and 1/2 dose pravastatin. Has failed simva, atorva, prava.    -PSCK9i indicated per 2018 ACC/AHA guidelines in this patient with established ASCVD whose LDL-C remains above threshold of 70 mg/dl despite maximally tolerated statin therapy.  -Praluent tolerated, PA approved for 2020. Excellent response to pcsk9i  -Stopped zetia as LDL at goal with pcsk9i alone  -Meets criteria for Vascepa based upon hx of CAD, s/p CABG, T2D, trigs >135 already on max-tolerated statin (unable to tolerate) - will further reduce secondary outcomes  -persistent hypertrig despite vascepa - may have induced Afib    Tx goal: non-HDL-C <100,  LDL-C <70  (optional: apoB <80)   At goal:  Yes, direct LDL = 33, 6/3/21   Plan:  - reinforced ongoing TLC measures   - monitor labs Q6mo - update labs Q6mo if stable - track direct LDLc due to very low calc LDLc    Meds:   - continue praluent 150mg daily  - stop vascepa due to intolerance and new onset Afib  - continue vitamin D3 3547-6761 daily     DM: type 2, non-insulin  Lab Results   Component Value Date    HBA1C 6.7 (H) 06/03/2021     Goal A1c <7.0%   ACR: abnormal: A2  invokana too expensive   Does not seem to be greg Metformin more that once daily  Plan:  - continue acarbose for now  - metformin 500 mg Qpm due to SE unable to start ER form.     - holding Invokana 100 mg for now due to expense, consider restart if A1c elevates switch to Jardiance or Farxiga if cost is better - pt to call us   - consider glp1RA weekly as 3rd agent   - recommmend for routine care with PCP (or endocrine) to include regular A1c monitoring, annual albumin/creatinine ratio (ACR), annual diabetic retinopathy screening, foot exams, annual flu vaccine, and updates to pneumonia vaccines as appropriate     Smoking: ended year 1987 or pack years of use 25    ANTITHROMBOTIC THERAPY:   - continue xarelto 20mg for now due to Afib  - if stable and no recurrent afib, could consider d/c and return to plavix     9. Other Issues:    - Afib - hx of paroxysmal Afib post-op CABG.    Recurrent - seen at ED and started on dilt TID and xarelto per cardilogy.  Appears to be contemporaneous to the vascepa initiation  No current, has been off BB and xarelto in 2013.   - continue diltiazem and xarelto   - stop vascepa   - Continue monitoring per cardiology - will review     - hypogonadism, erectile dysfunction, stable and improved off thiazide and spironolactone.  Pt aware of adverse effects of testosterone on lipids    - CHOCO - stable, continue f/u with sleep MD     Studies Ordered At This Visit:  None   Blood Work to Be Obtained Prior to Next Visit:  As noted   Disposition: 6mo, sooner prn     Diagnosis:  1. Resistant hypertension  amLODIPine (NORVASC) 5 MG Tab   2. Dyslipidemia  Comp Metabolic Panel    LDL, DIRECT    Lipid Profile   3. Coronary artery disease  involving native heart without angina pectoris, unspecified vessel or lesion type     4. Hypertriglyceridemia  Comp Metabolic Panel    LDL, DIRECT    Lipid Profile   5. Long term (current) use of antithrombotics/antiplatelets     6. Hx of CABG     7. Obstructive sleep apnea syndrome     8. Erectile dysfunction, unspecified erectile dysfunction type     9. Type 2 diabetes mellitus without complication, without long-term current use of insulin (HCC)     10. Paroxysmal atrial fibrillation (HCC)  diltiazem (TIAZAC) 180 MG SR capsule   11. Chronic anticoagulation          History of Present Illness:   Wang Forman Jr. is a male seen for evaluation of resistant HTN and management.   Wang Forman Jr. is referred by:cardiologist Scott Cameron MD    HTN:  Current HTN concerns:  Reports ongoing fatigue and dizz with positional changes.  No vertigo.  No other focal neuro s/s.    No major changes to dietary patterns or physical activity   Current ADRs: none   Home BP lo-140s/60-70s  HR low 60s   24h ABPM completed: not tested  Adherence to current HTN meds: compliant all of the time     Antiplatelet/anticoagulation:   Plavix, tolerating w/o bleeding   No hx of Afib since     Hyperlipidemia:    Interval hx/concerns:  Wondering about very low LDLc numbers   Current treatment: praluent, vascepa   Myalgias? no  Other adverse drug reactions? No  Lipid profile: as noted below   Clinical evidence of ASCVD:     1) hx of MI/ACS:  Yes, Details: s/p CABG  (Dr. Love)   2) coronary or other revascularization procedure: PCI with stenting      T2D:  Stable. No current symptoms reported.   Tolerating meds and no recent med changes.   Home blood sugars stable, reports no lows.   Last A1c   Lab Results   Component Value Date    HBA1C 6.7 (H) 2021     CHOCO:   Still daily somnolence and reduced energy. feels poorly rested daily, currently on Bipap, seening pulm MD.    Afib:   New onset of Afib  after starting vascepa.  Had not prior episodes since .  Notes 3 discrete episodes and started on dilt and xarelto per cardiology.      Current Outpatient Medications:   •  diltiazem, 180 mg, Oral, DAILY  •  amLODIPine, 5 mg, Oral, QHS  •  pantoprazole, Take 1 tablet by mouth once daily, Taking  •  metFORMIN, 500 mg, Oral, DAILY, Taking  •  doxazosin, TAKE 1/2 (ONE-HALF) TABLET BY MOUTH ONCE DAILY BEFORE  BEDTIME, Taking  •  Eplerenone, Take 1 tablet by mouth twice daily, Taking  •  Praluent, INJECT 1 SYRINGE SUBCUTANEOUSLY ONCE EVERY 14 DAYS AS  DIRECTED, Taking  •  acarbose, Take 1 tablet by mouth twice daily, Taking  •  rivaroxaban, 20 mg, Oral, PM MEAL, Taking  •  nitroglycerin, 1 tablet Sublingual As directed for angina, Taking  •  trandolapril, TAKE 1 TABLET BY MOUTH ONCE DAILY IN THE EVENING, Taking  •  cholecalciferol, 400 Units, Oral, DAILY, Taking  •  therapeutic multivitamin-minerals, 1 tablet, Oral, DAILY, Taking  •  testosterone cypionate, 200 mg, Intramuscular, Q21 DAYS, Taking    Current Facility-Administered Medications:   •  testosterone cypionate      Social History:     Social History     Tobacco Use   • Smoking status: Former Smoker     Packs/day: 2.00     Years: 25.00     Pack years: 50.00     Types: Cigarettes     Quit date: 1987     Years since quittin.4   • Smokeless tobacco: Never Used   Vaping Use   • Vaping Use: Never used   Substance Use Topics   • Alcohol use: Not Currently     Alcohol/week: 0.0 oz     Comment: a few times a year   • Drug use: No       Review of Systems:   Review of Systems   Constitutional: Positive for malaise/fatigue. Negative for chills and fever.   HENT: Negative for nosebleeds, sore throat and tinnitus.    Eyes: Negative for blurred vision and double vision.   Respiratory: Negative for cough, hemoptysis, shortness of breath and wheezing.    Cardiovascular: Negative for chest pain, palpitations, orthopnea and leg swelling.   Gastrointestinal: Negative  "for abdominal pain, blood in stool, diarrhea, melena, nausea and vomiting.   Genitourinary: Negative for hematuria.   Musculoskeletal: Negative for joint pain and myalgias.   Skin: Negative for itching and rash.   Neurological: Positive for dizziness (with positional changes ). Negative for tremors, focal weakness, seizures, weakness and headaches.   Endo/Heme/Allergies: Does not bruise/bleed easily.   Psychiatric/Behavioral: Negative for depression. The patient is not nervous/anxious and does not have insomnia.       Objective:     Vitals:    06/10/21 1358 06/10/21 1402   BP: 144/63 149/70   BP Location: Left arm Left arm   Patient Position: Sitting Sitting   BP Cuff Size: Adult Adult   Pulse: 76 77   Weight: 99.8 kg (220 lb)    Height: 1.689 m (5' 6.5\")      BP Readings from Last 5 Encounters:   06/10/21 149/70   03/05/21 136/76   12/17/20 123/52   11/13/20 138/66   09/10/20 128/63      Body mass index is 34.98 kg/m².    Physical Exam  Constitutional:       General: He is not in acute distress.     Appearance: He is well-developed. He is not diaphoretic.   Neck:      Vascular: No JVD.   Cardiovascular:      Rate and Rhythm: Normal rate and regular rhythm.      Heart sounds: Normal heart sounds. No murmur heard.   No friction rub. No gallop.    Pulmonary:      Effort: Pulmonary effort is normal. No respiratory distress.      Breath sounds: Normal breath sounds. No wheezing or rales.   Musculoskeletal:         General: No tenderness.   Skin:     General: Skin is warm and dry.      Coloration: Skin is not pale.      Findings: No erythema or rash.   Neurological:      Mental Status: He is alert and oriented to person, place, and time.      Coordination: Coordination normal.   Psychiatric:         Behavior: Behavior normal.          Accessory Clinical Findings:   Echocardiogram:  Results for orders placed or performed during the hospital encounter of 09/26/17   ECHOCARDIOGRAM COMP W/O CONT   Result Value Ref Range    " Eject.Frac. MOD BP 57.79     Eject.Frac. MOD 4C 56.25     Eject.Frac. MOD 2C 62.39       Lab Results   Component Value Date    CHOLSTRLTOT 103 (L) 06/03/2021    LDL 8 06/03/2021    HDL 33.0 (L) 06/03/2021    TRIGLYCERIDE 311 (H) 06/03/2021            Ref. Range 12/4/2020 07:15   LDL Direct Latest Ref Range: <100 mg/dL 46       Lab Results   Component Value Date    HBA1C 6.7 (H) 06/03/2021      Lab Results   Component Value Date    SODIUM 132 (L) 06/03/2021    POTASSIUM 4.4 06/03/2021    CHLORIDE 101 06/03/2021    CO2 24 06/03/2021    GLUCOSE 146 (H) 06/03/2021    BUN 18 06/03/2021    CREATININE 1.4 (H) 06/03/2021      Lab Results   Component Value Date    ALDOSTERONE 28 07/01/2019      Lab Results   Component Value Date    URCREAT 148 08/12/2020    MICROALBUR 7.1 08/12/2020    MALBCRT 5 08/12/2020        Ref. Range 7/1/2019 13:20 7/23/2019 07:25 8/26/2019 11:05   CPK Total Latest Ref Range: 39 - 308 U/L 453 (H) 170 240     VASCULAR IMAGING:     Echo 2017  No prior study is available for comparison.   Left ventricular ejection fraction is visually estimated to be 60%.   Normal diastolic function. Poor endocardial definition difficult to   assess wall motion.  No evidence of valvular abnormality based on Doppler evaluation.   Estimated right ventricular systolic pressure  is 25 mmHg.    Echo 7/25/19  Normal left ventricular systolic function. Left ventricular ejection   fraction is visually estimated to be 55%.   Normal diastolic function.    Renal art duplex 7/31/19  Unremarkable renal sonogram with no evidence of renal artery stenosis as demonstrated above    Javad Childs M.D.   [facility]   [facility]   [phone]

## 2021-06-11 PROBLEM — G47.33 OSA TREATED WITH BIPAP: Status: ACTIVE | Noted: 2021-06-11

## 2021-06-11 PROBLEM — I51.7 LVH (LEFT VENTRICULAR HYPERTROPHY): Status: ACTIVE | Noted: 2021-06-11

## 2021-06-11 PROBLEM — E87.1 HYPONATREMIA: Status: ACTIVE | Noted: 2021-06-11

## 2021-06-22 DIAGNOSIS — I1A.0 RESISTANT HYPERTENSION: ICD-10-CM

## 2021-06-23 RX ORDER — TRANDOLAPRIL TABLETS 4 MG/1
TABLET ORAL
Qty: 90 TABLET | Refills: 1 | Status: SHIPPED | OUTPATIENT
Start: 2021-06-23 | End: 2022-01-07

## 2021-06-27 DIAGNOSIS — E78.5 DYSLIPIDEMIA: ICD-10-CM

## 2021-06-28 ENCOUNTER — DOCUMENTATION (OUTPATIENT)
Dept: VASCULAR LAB | Facility: MEDICAL CENTER | Age: 76
End: 2021-06-28

## 2021-06-28 DIAGNOSIS — E78.5 DYSLIPIDEMIA: ICD-10-CM

## 2021-06-28 DIAGNOSIS — I48.0 PAROXYSMAL ATRIAL FIBRILLATION (HCC): ICD-10-CM

## 2021-06-28 RX ORDER — ALIROCUMAB 150 MG/ML
INJECTION, SOLUTION SUBCUTANEOUS
Qty: 6 ML | Refills: 3 | Status: SHIPPED | OUTPATIENT
Start: 2021-06-28 | End: 2021-06-30

## 2021-06-28 NOTE — PROGRESS NOTES
Patient calling concerned about the cost of both his Praluent and his Xarelto.  Will refer to pharmacotherapy service to discuss alternatives  MA to arrange and inform patient    Michael Bloch, MD  Vascular Medicine

## 2021-06-30 ENCOUNTER — DOCUMENTATION (OUTPATIENT)
Dept: VASCULAR LAB | Facility: MEDICAL CENTER | Age: 76
End: 2021-06-30

## 2021-06-30 DIAGNOSIS — E78.5 DYSLIPIDEMIA: ICD-10-CM

## 2021-06-30 RX ORDER — ALIROCUMAB 150 MG/ML
INJECTION, SOLUTION SUBCUTANEOUS
Qty: 6 ML | Refills: 0 | Status: SHIPPED | OUTPATIENT
Start: 2021-06-30 | End: 2021-08-27

## 2021-06-30 NOTE — PROGRESS NOTES
[facility] and [facility]    Received anticoag and lipid referral for Praluent and Xarelto due to affordability concerns from Dr. Bloch on 6/28/21    Called and spoke w/ pt. Recommended that he research PAP for both Praluent and Xarelto.     Notified pt that there are coupon cards for Xarelto and to reach out to his PCP about potential samples. Advised pt that if these options are not viable, then he would most likely need to transition to warfarin.     Pt states he will f/u w/ PCP.    Insurance: [facility] Medicare Advantage - Cannot be seen in clinic  PCP: [facility]  Locations to be seen: N/a    [facility] at [phone], fax [phone]    Lana CourtneyD

## 2021-07-02 ENCOUNTER — TELEPHONE (OUTPATIENT)
Dept: VASCULAR LAB | Facility: MEDICAL CENTER | Age: 76
End: 2021-07-02

## 2021-07-02 NOTE — TELEPHONE ENCOUNTER
LM for pt to call back to schedule   ----- Message from Javad Chang PharmD sent at 6/29/2021 11:23 AM PDT -----  Regarding: RE: await pharmacotherapy initial  Great question.  The visit type is initial pharmacotherapy.  The room will room 5.  Thanks!  ----- Message -----  From: Alecia Gamez Med Ass't  Sent: 6/28/2021   3:47 PM PDT  To: Javad Chang PharmD  Subject: FW: await pharmacotherapy initial                Do I just schedule this pt in rooms 4 or 5?? Under routine?  ----- Message -----  From: Michael J Bloch, M.D.  Sent: 6/28/2021   3:40 PM PDT  To: Alecia Gamez Med Ass't, #  Subject: await pharmacotherapy initial                    Arturo -please set him up with a pharmacotherapy appt to discuss. I placed referral    Mb  ----- Message -----  From: Alecia Gamez Med Ass't  Sent: 6/28/2021   3:28 PM PDT  To: Michael J Bloch, M.D.  Subject: FW: Patient Call - Xarelto & Praluent            Are there alternatives for this patient?  ----- Message -----  From: Leslye Ho  Sent: 6/28/2021   2:34 PM PDT  To: [facility]  Subject: Patient Call - Xarelto & Praluent                Pt LVM asking for an alternative medication for Xarelto and Praluent that's affordable. Can you please call pt?

## 2021-07-20 ENCOUNTER — NON-PROVIDER VISIT (OUTPATIENT)
Dept: VASCULAR LAB | Facility: MEDICAL CENTER | Age: 76
End: 2021-07-20
Attending: INTERNAL MEDICINE
Payer: COMMERCIAL

## 2021-07-20 VITALS — SYSTOLIC BLOOD PRESSURE: 135 MMHG | HEART RATE: 80 BPM | DIASTOLIC BLOOD PRESSURE: 76 MMHG

## 2021-07-20 PROCEDURE — 99212 OFFICE O/P EST SF 10 MIN: CPT

## 2021-07-20 PROCEDURE — 99213 OFFICE O/P EST LOW 20 MIN: CPT

## 2021-07-20 ASSESSMENT — CHA2DS2 SCORE
CHF OR LEFT VENTRICULAR DYSFUNCTION: NO
CHA2DS2 VASC SCORE: 5
PRIOR STROKE OR TIA OR THROMBOEMBOLISM: NO
AGE 75 OR GREATER: YES
VASCULAR DISEASE: YES
AGE 65 TO 74: NO
HYPERTENSION: YES
DIABETES: YES

## 2021-07-20 NOTE — PROGRESS NOTES
"            Target end date: Indefinite     Indication: AF     Drug: Xarelto 20mg QD     CHADsVASC = 5 (age, htn, DM2, CAD)    Health Status Since Last Assessment   Patient denies any new relevant medical problems, ED visits or hospitalizations   Patient denies any embolic events (stroke/tia/systemic embolism)    Adherence with DOAC Therapy   Pt has several missed any doses in the average week recently due to affordability issues.     Patient presents to clinic today to discuss possible alternatives or PAP for his expensive medications (Xarelto and Praluent). He is NOT interested in switching to warfarin at this time. He also states that he is sure he WILL NOT qualify for any PAP programs. He is currently in the donut hole for medicare and it is costing him approximately $100/mo for Xarelto and $400/ 3mo supply of Praluent.  Patient reports he can technically afford them, but he \"does not want to go bankrupt buying medicine\".   Patient reports that he has started to \"stretch\" out his medications by taking Xarelto every other day and Praluent every 3-4 weeks instead of every 14 days.   I supplied the patient with samples of Xarelto 20mg (Lot [id number] exp 02/23) to help patient and encouraged him to take daily. We did not have any samples of Praluent to dispense.       Bleeding Risk Assessment     Denies Epistaxis   Pt denies any excessive or unusual bleeding/hematomas.  Pt denies any GI bleeds or hematemesis.  Pt denies any concerning daily headache or sub dural hematoma symptoms.     Pt denies any hematuria    Latest Hemoglobin 16.4   ETOH overuse denies     Creatinine Clearance/Renal Function     Latest ClCr 49.5 ml/min  Results for MADELEINE MACHADO JR. (MRN [id number]) as of 7/20/2021 14:58   Ref. Range 6/3/2021 07:30   Creatinine Latest Ref Range: 0.8 - 1.3 mg/dL 1.4 (H)   GFR If  Latest Ref Range: >60 mL/min/1.73 m 2 59 (A)   GFR If Non  Latest Ref Range: >60 mL/min/1.73 m 2 49 " (A)       Hepatic function   Latest LFTs WNL   Pt denies any history of liver dysfunction      Drug Interactions   Platelets: 195   ASA/other antiplatelets none   NSAID none   Other drug interactions none   Verified no anticonvulsant or azole therapy, education provided for future use.     Examination   Blood Pressure 135/76   Symptomatic hypotension denies    Significant gait impairment/imbalance/high risk for falls? denies    Final Assessment and Recommendations:   Patient appears stable from the anticoagulation standpoint.     Benefits of continued DOAC therapy outweigh risks for this patient   Recommend pt continue with current DOAC therapy Xarelto 20mg QD (with dose adjustment)     Other Actions: cmp/ cbc hemogram ordered prior to next visit         [facility] - Initial Visit  Date of Service: 07/20/21    Wang Forman Jr. has been referred for evaluation and management of dyslipidemia    Referral Source: Dr. Bloch    [facility]  History of ASCVD: Yes, Details: CAD- s/p CABG also MI with PCI and stents  Other Established (non-atherosclerotic) Vascular Disease, if Present: None  Age at Initial Diagnosis of Dyslipidemia:     Current Prescription Lipid Lowering Medications - including dose:   Statin: None  Non-Statin: Praluent 150mg Q14 days  Current Lipid Lowering and Related Supplements:   Vit D 2637-1771 units daily  Any Current Side Effects Potentially Related to Lipid Lowering therapy?   No  Current Adherence to Lipid Lowering Therapies   Partial  Previously Attempted Interventions for Lipids - including outcome  Statin: Patient previously on Rosuvastatin, Pravastatin, Simvastatin, Atorvastatin    Outcome: Myalgias  Non-Statin: Zetia, Vascepa   Outcome: Patient d/c zetia as LDL at goal with PCSK9i alone and Vascepa d/c'd due to intolerance  Any Previous History of Statin Intolerance?   Yes, Details: patient report extreme myalgias  Baseline Lipids Prior to Treatment:   Shown here:  Results for  JEANNETTE MADELEINE SOL  (MRN [id number]) as of 7/22/2015   07:47   Ref. Range 7/22/2015 07:06   Cholesterol,Tot Latest Ref Range: 120 - 200 mg/dL 152   Triglycerides Latest Ref Range: 0 - 150 mg/dL 189 (H)   HDL Latest Ref Range: 40.0 - 60.0 mg/dL 26.0 (L)   Non HDL Cholesterol Latest Ref Range: 30 - 160  126   LDL Latest Ref Range: <100 mg/dL 88       PAST MEDICAL HISTORY:  has a past medical history of Adult acne, Allergic rhinitis, Anesthesia, Arthritis of knee, Atrial fibrillation (HCC) (2013), Breath shortness, Cataract, Colon polyps, Coronary artery disease, Degenerative disk disease, DISH (diffuse idiopathic skeletal hyperostosis), Fibromyalgia, H/O bone density study (03/30/12), Heart burn, History of kidney stones, Hyperlipidemia, Hypertension, Hypogonadism, male, Myalgia, Myocardial infarct (HCC), Osteoarthritis, Restless leg syndrome, Sleep apnea, Type 2 diabetes mellitus (HCC), and Vertigo.    PAST SURGICAL HISTORY:  has a past surgical history that includes uvulopharyngopalatoplasty; knee arthroscopy (2009); cataract extraction with iol; angioplasty (2004); angioplasty (2009); carpal tunnel endoscopic (2011); multiple coronary artery bypass (12/5/2013); other neurological surg (03/2017); lumbar laminectomy diskectomy (10/24/2017); lumbar laminectomy diskectomy; and lumbar laminectomy diskectomy (2/13/2018).    CURRENT MEDICATIONS:   Current Outpatient Medications:   •  Praluent, INJECT 1 SYRINGE SUBCUTANEOUSLY ONCE EVERY 14 DAYS AS DIRECTED  •  trandolapril, TAKE 1 TABLET BY MOUTH ONCE DAILY IN THE EVENING  •  diltiazem, 180 mg, Oral, DAILY  •  amLODIPine, 5 mg, Oral, QHS  •  pantoprazole, Take 1 tablet by mouth once daily  •  metFORMIN, 500 mg, Oral, DAILY  •  doxazosin, TAKE 1/2 (ONE-HALF) TABLET BY MOUTH ONCE DAILY BEFORE  BEDTIME  •  Eplerenone, Take 1 tablet by mouth twice daily  •  rivaroxaban, 20 mg, Oral, PM MEAL  •  nitroglycerin, 1 tablet Sublingual As directed for angina  •  cholecalciferol,  400 Units, Oral, DAILY  •  therapeutic multivitamin-minerals, 1 tablet, Oral, DAILY  •  testosterone cypionate, 200 mg, Intramuscular, Q21 DAYS  •  acarbose, Take 1 tablet by mouth twice daily    Current Facility-Administered Medications:   •  testosterone cypionate    ALLERGIES: Naproxen, Nsaids, Atenolol, Codeine, Hydrochlorothiazide, Morphine, Sulfa drugs, Vascepa [epa ethyl shona], Hmg-coa-r inhibitors, and Rosuvastatin calcium      SOCIAL HISTORY   Social History     Tobacco Use   Smoking Status Former Smoker   • Packs/day: 2.00   • Years: 25.00   • Pack years: 50.00   • Types: Cigarettes   • Quit date: 1987   • Years since quittin.5   Smokeless Tobacco Never Used     Change in weight: Stable  Exercise habits: no regular exercise program  Diet: common adult, low fat    Vitals:    21 1517   BP: 135/76   Pulse: 80      Physical Exam    DATA REVIEW:  Most Recent Lipid Panel:   Lab Results   Component Value Date    CHOLSTRLTOT 103 (L) 2021    TRIGLYCERIDE 311 (H) 2021    HDL 33.0 (L) 2021    LDL 8 2021       Other Pertinent Blood Work:   Lab Results   Component Value Date    SODIUM 132 (L) 2021    POTASSIUM 4.4 2021    CHLORIDE 101 2021    CO2 24 2021    ANION 11 2021    GLUCOSE 146 (H) 2021    BUN 18 2021    CREATININE 1.4 (H) 2021    CALCIUM 9.3 2021    ASTSGOT 31 2021    ALTSGPT 40 2021    ALKPHOSPHAT 66 2021    TBILIRUBIN 0.7 2021    ALBUMIN 4.1 2021    AGRATIO 1.3 2021    TSHULTRASEN 2.63 2021    CPKTOTAL 240 2019       Other: NA    Recent Imaging Studies:    None since last visit    ASSESSMENT AND PLAN  Patient Type, check all that apply:   Secondary Prevention  Established Atherosclerotic Cardiovascular Disease (ASCVD)  Yes, Details: CAD and s/p CABG and hx of MI and PCI with stenting  Other Established (non-atherosclerotic) Vascular Disease, if Present:  None  Evidence  "of 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):   Unknown (If yes, enter details of how diagnosis was made  ACC/AHA Indication for Statin Therapy, marcel all that apply:  Established ASCVD: Indication for High intensity statin   Calculated Risk for ASCVD, if applicable    N/A  Other Significant Risk Markers, if any, marcel all that apply   None  Goal LDL-C and nonHDL-C based on Clinic Protocol  LDL-C:   <70 mg/dL  Lifestyle Recommendations From Today’s Visit:    Eating Plan: Concentrate on  Low sat/Trans fat and Exercise: as much as tolerated with goal of 150min/wk  Statin Therapy Recommendations from Today’s Visit:   None- patient cannot tolerate  Non-Statin Medications Recommendations from Today’s Visit:   Praluent 150mg Q 14 days  Indication for PCSK9 Inhibitor, if applicable:  ASCVD with suboptimal control of LDL-C despite maximally tolerated statin  Supplements Recommended at this visit:   Continue with Vit D   Recommendations for Other Cardiovascular Risk Factors, marcel all that apply:   Hypertension- continue with current regimen and monitor at home and Diabetes/Impaired Fasting Glucose- Continue with current regimen and monitor at home   Other Issues:  Patient presented to clinic today to see how our clinic may be able to help him with the cost of his two expensive medications (Praluent and Xarelto).   Recommend that we begin process for PAP for these medications, but the patient reports that \"there is no way he will qualify for PAP. His meds are technically covered, and he can afford it, but he is in the donut hole right now and does not want to spend his group home money on medications\".   The patient reports that in order to \"stretch\" his medications out some and not have to spend so much on them, he had started taking his Xarelto every other day and his Praluent every 4 weeks instead of every 2 weeks.   I was able to supply the patient with some samples of Xarelto today, but sadly we did not have any " samples of Praluent available to provide to the patient.     His most recent lipid panel show patient is at goal as far as LDL levels, but TG has increased from last lipid panel at 311, which he reports are chronically elevated. Encouraged the patient to try OTC fish oil supplement since he was unable to tolerate Vascepa (new onset AF) and instructed to incorporate a more regular exercise routine consisting of daily movement. Patient also encouraged to watch his diet and limit his sugar, carb and alcohol intake.       I also recommended that the patient go back to taking the medications as prescribed and could not endorse his current usage.   Patient appreciative of samples provided.     Studies Ordered at Todays Visit: None  Blood Work Ordered At Today’s visit: None- Dr. Childs already has lipid panel, LDL directt and CMP ordered for next follow up visit.     Follow up:   Patient has follow up scheduled in 5 Months with Dr. Childs as he reports his appt today was mainly to help with PAP but since he will not qualify he will follow up with regular visits with MD.     Zach Gundersen  Pharm D Student  Hosea SchwartzD    CC:  Ryan Arora M.D.  Bloch, Michael J, M.D.    Addendum:    Sent this pt's information to our RX coordinators.  They will investigate if there are any additional ideas to help with the cost of medications.  Apparently pt is not willing to transition to warfarin.    Javad Chang, LanaD

## 2021-08-17 ENCOUNTER — TELEPHONE (OUTPATIENT)
Dept: VASCULAR LAB | Facility: MEDICAL CENTER | Age: 76
End: 2021-08-17

## 2021-08-17 NOTE — TELEPHONE ENCOUNTER
[facility]    Spoke with pt, he is fine with the cost of his Praluent at this time.  He will update us if this changes.       Currently his Xarelto is costing him too much and he is only taking it every other day.  We discussed this together and pt is willing to take it daily and when he gets low on samples he will call our clinic to do a trial of warfarin for 30 days.  If warfarin can become stable and manageable for the pt, he would prefer to be on the warfarin for cost purposes.      Pt declines PAP.    Javad Chang, PharmD

## 2021-08-31 DIAGNOSIS — E78.5 DYSLIPIDEMIA: ICD-10-CM

## 2021-08-31 RX ORDER — ALIROCUMAB 150 MG/ML
INJECTION, SOLUTION SUBCUTANEOUS
Qty: 6 ML | Refills: 0 | Status: SHIPPED | OUTPATIENT
Start: 2021-08-31 | End: 2022-02-01 | Stop reason: SDUPTHER

## 2021-09-08 ENCOUNTER — TELEPHONE (OUTPATIENT)
Dept: VASCULAR LAB | Facility: MEDICAL CENTER | Age: 76
End: 2021-09-08

## 2021-09-08 NOTE — TELEPHONE ENCOUNTER
[facility]    Pt reports his cardiologist from [facility] would like to perform INR tests and dose warfarin.  Pt will continue with DOAC until October and then switch over to [facility] cardiology for warfarin therapy.     Follow up in 1 month.    Javad Chang, PharmD

## 2021-10-05 ENCOUNTER — TELEPHONE (OUTPATIENT)
Dept: VASCULAR LAB | Facility: MEDICAL CENTER | Age: 76
End: 2021-10-05

## 2021-10-05 NOTE — TELEPHONE ENCOUNTER
[facility]    Left VM with pt to f/u with cost of PCSK9 and DOAC.  Will follow up again in 1 month for a final time.    Javad Chang, LanaD

## 2021-10-10 DIAGNOSIS — I1A.0 RESISTANT HYPERTENSION: ICD-10-CM

## 2021-10-11 RX ORDER — EPLERENONE 50 MG/1
TABLET, FILM COATED ORAL
Qty: 180 TABLET | Refills: 0 | Status: SHIPPED | OUTPATIENT
Start: 2021-10-11 | End: 2022-01-07

## 2021-10-23 DIAGNOSIS — I10 ESSENTIAL HYPERTENSION: ICD-10-CM

## 2021-10-25 RX ORDER — DOXAZOSIN 2 MG/1
TABLET ORAL
Qty: 90 TABLET | Refills: 0 | Status: SHIPPED | OUTPATIENT
Start: 2021-10-25 | End: 2021-12-13 | Stop reason: DRUGHIGH

## 2021-11-10 ENCOUNTER — TELEPHONE (OUTPATIENT)
Dept: VASCULAR LAB | Facility: MEDICAL CENTER | Age: 76
End: 2021-11-10

## 2021-11-10 NOTE — TELEPHONE ENCOUNTER
[facility]    Pt is still on Xarelto, not on warfarin.  He is on samples from his cardiologist in [place] and reports he will stay on this medication.  Reports he has enough until his visit with Dr Childs in December.     Reports he is on Praluent, no complaints and tolerating it well.  He is only taking it once a month instead of every 2 weeks, but pt would like to try this dosing schedule instead for cost.  He declines trial for pt assistance.     Javad Chang, PharmD

## 2021-12-10 ENCOUNTER — OFFICE VISIT (OUTPATIENT)
Dept: VASCULAR LAB | Facility: MEDICAL CENTER | Age: 76
End: 2021-12-10
Attending: INTERNAL MEDICINE
Payer: COMMERCIAL

## 2021-12-10 VITALS
SYSTOLIC BLOOD PRESSURE: 152 MMHG | BODY MASS INDEX: 33.27 KG/M2 | HEIGHT: 67 IN | HEART RATE: 80 BPM | DIASTOLIC BLOOD PRESSURE: 71 MMHG | WEIGHT: 212 LBS

## 2021-12-10 VITALS — SYSTOLIC BLOOD PRESSURE: 158 MMHG | HEART RATE: 76 BPM | DIASTOLIC BLOOD PRESSURE: 75 MMHG

## 2021-12-10 DIAGNOSIS — Z95.1 HX OF CABG: ICD-10-CM

## 2021-12-10 DIAGNOSIS — I48.0 PAROXYSMAL ATRIAL FIBRILLATION (HCC): ICD-10-CM

## 2021-12-10 DIAGNOSIS — Z79.02 LONG TERM (CURRENT) USE OF ANTITHROMBOTICS/ANTIPLATELETS: ICD-10-CM

## 2021-12-10 DIAGNOSIS — I1A.0 RESISTANT HYPERTENSION: ICD-10-CM

## 2021-12-10 DIAGNOSIS — G47.33 OBSTRUCTIVE SLEEP APNEA SYNDROME: ICD-10-CM

## 2021-12-10 DIAGNOSIS — E78.1 HYPERTRIGLYCERIDEMIA: ICD-10-CM

## 2021-12-10 DIAGNOSIS — I25.10 CORONARY ARTERY DISEASE INVOLVING NATIVE HEART WITHOUT ANGINA PECTORIS, UNSPECIFIED VESSEL OR LESION TYPE: ICD-10-CM

## 2021-12-10 DIAGNOSIS — E78.5 DYSLIPIDEMIA: ICD-10-CM

## 2021-12-10 PROCEDURE — 99212 OFFICE O/P EST SF 10 MIN: CPT

## 2021-12-10 PROCEDURE — 99215 OFFICE O/P EST HI 40 MIN: CPT | Performed by: FAMILY MEDICINE

## 2021-12-10 RX ORDER — ACETAMINOPHEN 500 MG
500-1000 TABLET ORAL EVERY 6 HOURS PRN
COMMUNITY

## 2021-12-10 ASSESSMENT — ENCOUNTER SYMPTOMS
SORE THROAT: 0
DIARRHEA: 0
FEVER: 0
CHILLS: 0
SEIZURES: 0
TREMORS: 0
WEAKNESS: 0
DEPRESSION: 0
HEADACHES: 0
BLURRED VISION: 0
ORTHOPNEA: 0
PALPITATIONS: 0
SHORTNESS OF BREATH: 0
WHEEZING: 0
FOCAL WEAKNESS: 0
NERVOUS/ANXIOUS: 0
DIZZINESS: 1
ABDOMINAL PAIN: 0
MYALGIAS: 0
BLOOD IN STOOL: 0
BRUISES/BLEEDS EASILY: 0
INSOMNIA: 0
NAUSEA: 0
DOUBLE VISION: 0
VOMITING: 0
HEMOPTYSIS: 0
COUGH: 0

## 2021-12-10 NOTE — PATIENT INSTRUCTIONS
"Increase doxazosin to 1 tablet at bedtime to lower blood pressure.     Causes of stomach uneasiness, nausea - metformin, doxycycline, acarbose     Dizziness - diltiazem, eplerenone    We can consider a \"drug holiday\" for any of these meds in the future where we stop a med for 1-2 weeks.      See PCP for evaluation for dizziness.      "

## 2021-12-10 NOTE — PROGRESS NOTES
[facility] - Follow-up   12/10/21      Assessment / Plan:   1. Blood Pressure Control:  Qualifies for resistant HTN due to 4 meds w/o control   ACC/AHA (2017) goal <130/80, consider less stringent goals based upon pt preference and polypharmacy potential, though <140/80 at the most    Home BP at goal:  No, approaching goal    Office BP at goal: no, isolated syst HTN    24h ABPM (12/2019): Mean daytime: 146/75 consistent with poorly controlled out of office  systolic blood pressure. Nocturnal dip: Appears normal   Device candidate? No    -Wider pulse pressure indicative of arteriosclerosis  Plan:   Monitoring:   - continue home BP monitoring, reviewed correct technique:  Yes   - order 24h ABPM:  Ordered today   - monitor lytes/gfr routinely     2. Work up of Secondary Causes of Resistant Hypertension:   Renovascular HTN: Excluded  Primary Aldosteronism: Excluded, high renin  Thyroid Function: Excluded  Obstructive Sleep Apnea: using BiPAP, reports good compliance, pending with pulm MD in 2-3 weeks, minimal good nights sleep   Pheochromocytoma: Not Yet Assessed  Other: none  Recommendations At This Visit:        3. Medication Use / Adherence:  Assessment: Complete  Recommendations: Instructed to Continue Taking All Medications As Prescribed         4. End Organ Damage:   Left Ventricular Hypertrophy: Absent on  Echocardiogram Date: 2017  Albuminuria: Microalbuminuria, level A2 on Date: 10/2018  Renal Function: Chronic Kidney Disease  Stage 3a   - avoid nephrotoxins  - continue HTN control with RAAS blockade   - monitor lytes/gfr routinely  - eval with nephrology if worsening over time    Established Cardiovascular Disease:    # CAD - s/p CABG, 12/2013, stable  still in high risk stage <10yrs from operation   - continue aggressive med mgmt, qualifies for pcsk9i  - ongoing care with cardiology    # Afib - hx of paroxysmal Afib post-op CABG, recurrent   Appears to be contemporaneous to the vascepa  initiation  No current, has been off BB and xarelto in 2013.   - continue diltiazem and OAC for CVA proph    - stopped vascepa   - Continue monitoring per cardiology    5. Lifestyle Recommendations:  Smoking: continued complete avoidance of all tobacco products     Physical Activity: continue healthy activity to improve CV fitness, see care instructions for additional details     Weight Management and Nutrition: Dietary plan was discussed with patient at this visit including DASH, low sodium and/or as outlined in care instructions     6. Standard HTN Pharmacotherapy:  ACEI/ARB: continue trandolapril 4mg QHS for true 24h coverage  Thiazide Type Diuretic: stop due to worsening ED  Calcium Channel Blocker (CCB): continue amlodipine 5mg daily - aware on nonDHP-CCB too     7. Additional Agents:  Aldosterone Antagonist: eplerenone 50mg BID, consider reduction to 25mg BID in future   Loop Diuretic: none   Peripheral Alpha Blocker: increase doxazosin to 2mg QHS 1 full tab, can then consider increase to 4mg QHS  Other CCB: continue diltiazem 180mg ER daily to reduce fatigue while providing rate-control for Afib   Direct Vasodilator: none  Centrally Acting Alpha Agonist: prefer to avoid clonidine due to rebound HTN effects, will consider prazosin if BP spikes start occurring   Other:   BB: stop metoprolol due to positional dizz, lowHR      8. Other CV Risk Factors:     Lipid management:   Secondary prevention   -Myalgias on rosuva and 1/2 dose pravastatin. Has failed simva, atorva, prava.    -PSCK9i indicated per 2018 ACC/AHA guidelines in this patient with established ASCVD whose LDL-C remains above threshold of 70 mg/dl despite maximally tolerated statin therapy.  -Praluent tolerated, PA approved for 2020. Excellent response to pcsk9i  -Stopped zetia as LDL at goal with pcsk9i alone  -Meets criteria for Vascepa based upon hx of CAD, s/p CABG, T2D, trigs >135 already on max-tolerated statin (unable to tolerate) - will  "further reduce secondary outcomes  -persistent hypertrig despite vascepa - may have induced Afib    Tx goal: non-HDL-C <100,  LDL-C <70  (optional: apoB <80)   At goal:  Yes, direct LDL = 33, 6/3/21   Plan:  - reinforced ongoing TLC measures   - monitor labs Q6mo - update labs Q6mo if stable - track direct LDLc due to very low calc LDLc   Meds:   - continue praluent 150mg daily  - stop vascepa due to intolerance and new onset Afib  - continue vitamin D3 [id number] daily     DM: type 2, non-insulin  Lab Results   Component Value Date    HBA1C 6.7 (H) 06/03/2021     Goal A1c <7.0%   ACR: abnormal: A2  invokana too expensive   Does not seem to be greg Metformin more that once daily  Plan:  - continue acarbose for now  - metformin 500 mg Qpm due to SE unable to start ER form.     - holding Invokana 100 mg for now due to expense, consider restart if A1c elevates switch to Jardiance or Farxiga if cost is better - pt to call us   - consider glp1RA weekly as 3rd agent   - recommmend for routine care with PCP (or endocrine) to include regular A1c monitoring, annual albumin/creatinine ratio (ACR), annual diabetic retinopathy screening, foot exams, annual flu vaccine, and updates to pneumonia vaccines as appropriate     Smoking: ended year 1987 or pack years of use 25    ANTITHROMBOTIC THERAPY:   - ok to transition from xarelto to VKA with INR mgmt per AC clinic - pharmacist to meet with pt today - main   is cost of DOAC and \"donut hole\"   - he is advised they are non-inferior to each other but there are increase INR testing and dietary restrictions   - if stable and no recurrent afib    9. Other Issues:    # hypogonadism, erectile dysfunction, stable and improved off thiazide and spironolactone.  Pt aware of adverse effects of testosterone on lipids  - defer mgmt to PCP and/or urology/endocrine     # dizziness and abdominal uneasiness   - as reviewed with pt this could be multifactorial or contributed to a specific " "condition vs medication   - current pattern does not necessarily fit a med ADRs nor HTN related that I am able to discern.    - most recent echo normal, Afib and HR in stable range,  BP excursions are limited and ranges would not traditionally be implicated in dizziness causation   - we reviewed that the following meds could be problematic:    Causes of stomach uneasiness, nausea - metformin, doxycycline, acarbose   Dizziness - diltiazem, eplerenone  -We can consider a \"drug holiday\" for any of these meds in the future where we stop a med for 1-2 weeks but I recommend eval with PCP 1st further further evaluation of his sx      Studies Ordered At This Visit:  None   Blood Work to Be Obtained Prior to Next Visit:  As noted   Disposition: 3mo    Total time: 40min - chart review/prep, review of other providers' records, imaging/lab review, face-to-face time for history/examination, ordering, prescribing,  review of results/meds/ treatment plan with patient/family/caregiver, documentation in EMR, care coordination (as needed)      Diagnosis:  1. Resistant hypertension     2. Dyslipidemia     3. Coronary artery disease involving native heart without angina pectoris, unspecified vessel or lesion type     4. Hx of CABG     5. Paroxysmal atrial fibrillation (HCC)     6. Hypertriglyceridemia     7. Obstructive sleep apnea syndrome     8. Long term (current) use of antithrombotics/antiplatelets          History of Present Illness:   Wang Forman Jr. is a male seen for evaluation of resistant HTN and management.   Wang Forman Jr. is referred by:cardiologist Scott Cameron MD    Subjective     HTN:  Tolerating meds, no changes since last visit   Home BP lo-150s/70-80s,  HR 70s   Adherence to current HTN meds: compliant all of the time     Afib:   On dilt, xarelto per cardiology  - had new onset Afib after vascepa initiation.  Last episode was      Antiplatelet/anticoagulation:  Off plavix, " on xarelto - no bleeding, some cost issues.  Getting samples from cardio   Would like to consider transition to VKA     Hyperlipidemia:    Tolerating praluent no issues - using Q1mo instead of Q2wks.  Had labs done   Clinical evidence of ASCVD:     1) hx of MI/ACS:  Yes, Details: s/p CABG 2013 (Dr. Love)   2) coronary or other revascularization procedure: PCI with stenting      T2D:  On acarbose and metformin, no concerns.     Last A1c   Lab Results   Component Value Date    HBA1C 6.7 (H) 06/03/2021     CHOCO:   Still daily somnolence and reduced energy. feels poorly rested daily, currently on Bipap, seening pulm MD.      Current Outpatient Medications:   •  metFORMIN, Take 1 tablet by mouth once daily, Taking  •  doxazosin, TAKE 1/2 (ONE-HALF) TABLET BY MOUTH ONCE DAILY AT BEDTIME, Taking  •  Eplerenone, Take 1 tablet by mouth twice daily, Taking  •  Praluent, INJECT 1 SYRINGE SUBCUTANEOUSLY ONCE EVERY 14 DAYS AS DIRECTED, Taking  •  Acetaminophen-Codeine, , Taking  •  doxycycline, Take 50 mg by mouth., Taking  •  HYDROcodone-acetaminophen, TAKE 1 TABLET BY MOUTH EVERY 6 HOURS AS NEEDED FOR MODERATE TO SEVERE POST PROCEDURE PAIN FOR 5 DAYS, Taking  •  traMADol, TAKE 1 TABLET BY MOUTH TWICE DAILY AS NEEDED FOR MODERATE TO SEVERE PAIN, Taking  •  azithromycin, 2 tabs stat then 1 tab daily (Z-Aguilar), Taking  •  tazorotene, Apply small amount to affected areas nightly, Taking  •  trandolapril, TAKE 1 TABLET BY MOUTH ONCE DAILY IN THE EVENING, Taking  •  diltiazem, 180 mg, Oral, DAILY, Taking  •  amLODIPine, 5 mg, Oral, QHS, Taking  •  pantoprazole, Take 1 tablet by mouth once daily, Taking  •  acarbose, Take 1 tablet by mouth twice daily, Taking  •  rivaroxaban, 20 mg, Oral, PM MEAL, Taking  •  nitroglycerin, 1 tablet Sublingual As directed for angina, Taking  •  vitamin D3, 400 Units, Oral, DAILY, Taking  •  therapeutic multivitamin-minerals, 1 Tablet, Oral, DAILY, Taking  •  testosterone cypionate, 200 mg,  "Intramuscular, Q21 DAYS, Taking      Social History:     Social History     Tobacco Use   • Smoking status: Former Smoker     Packs/day: 2.00     Years: 25.00     Pack years: 50.00     Types: Cigarettes     Quit date: 1987     Years since quittin.9   • Smokeless tobacco: Never Used   Vaping Use   • Vaping Use: Never used   Substance Use Topics   • Alcohol use: Not Currently     Alcohol/week: 0.0 oz     Comment: a few times a year   • Drug use: No       Review of Systems:   Review of Systems   Constitutional: Positive for malaise/fatigue. Negative for chills and fever.   HENT: Negative for nosebleeds, sore throat and tinnitus.    Eyes: Negative for blurred vision and double vision.   Respiratory: Negative for cough, hemoptysis, shortness of breath and wheezing.    Cardiovascular: Negative for chest pain, palpitations, orthopnea and leg swelling.   Gastrointestinal: Negative for abdominal pain, blood in stool, diarrhea, melena, nausea and vomiting.   Genitourinary: Negative for hematuria.   Musculoskeletal: Negative for joint pain and myalgias.   Skin: Negative for itching and rash.   Neurological: Positive for dizziness (with positional changes ). Negative for tremors, focal weakness, seizures, weakness and headaches.   Endo/Heme/Allergies: Does not bruise/bleed easily.   Psychiatric/Behavioral: Negative for depression. The patient is not nervous/anxious and does not have insomnia.         Objective        Objective:     Vitals:    12/10/21 1004 12/10/21 1010   BP: (!) 161/68 152/71   BP Location: Left arm Left arm   Patient Position: Sitting Sitting   BP Cuff Size: Adult Adult   Pulse: 78 80   Weight: 96.2 kg (212 lb)    Height: 1.689 m (5' 6.5\")      BP Readings from Last 5 Encounters:   12/10/21 152/71   21 132/64   21 135/76   21 120/60   06/10/21 149/70      Body mass index is 33.71 kg/m².    Physical Exam  Constitutional:       General: He is not in acute distress.     Appearance: He " is well-developed. He is not diaphoretic.   Neck:      Vascular: No JVD.   Cardiovascular:      Rate and Rhythm: Normal rate and regular rhythm.      Heart sounds: Normal heart sounds. No murmur heard.  No friction rub. No gallop.    Pulmonary:      Effort: Pulmonary effort is normal. No respiratory distress.      Breath sounds: Normal breath sounds. No wheezing or rales.   Musculoskeletal:         General: No tenderness.   Skin:     General: Skin is warm and dry.      Coloration: Skin is not pale.      Findings: No erythema or rash.   Neurological:      Mental Status: He is alert and oriented to person, place, and time.      Coordination: Coordination normal.   Psychiatric:         Behavior: Behavior normal.          Accessory Clinical Findings:   Echocardiogram:  Results for orders placed or performed during the hospital encounter of 09/26/17   ECHOCARDIOGRAM COMP W/O CONT   Result Value Ref Range    Eject.Frac. MOD BP 57.79     Eject.Frac. MOD 4C 56.25     Eject.Frac. MOD 2C 62.39       Lab Results   Component Value Date    CHOLSTRLTOT 111 (L) 12/03/2021    LDL 26 12/03/2021    HDL 31.0 (L) 12/03/2021    TRIGLYCERIDE 268 (H) 12/03/2021            Ref. Range 12/4/2020 07:15   LDL Direct Latest Ref Range: <100 mg/dL 46       Lab Results   Component Value Date    HBA1C 6.7 (H) 06/03/2021      Lab Results   Component Value Date    SODIUM 138 12/03/2021    POTASSIUM 4.4 12/03/2021    CHLORIDE 104 12/03/2021    CO2 23 12/03/2021    GLUCOSE 151 (H) 12/03/2021    BUN 14 12/03/2021    CREATININE 1.3 12/03/2021          Lab Results   Component Value Date    URCREAT 148 08/12/2020    MICROALBUR 7.1 08/12/2020    MALBCRT 5 08/12/2020        Ref. Range 7/1/2019 13:20 7/23/2019 07:25 8/26/2019 11:05   CPK Total Latest Ref Range: 39 - 308 U/L 453 (H) 170 240     VASCULAR IMAGING:     Echo 2017  No prior study is available for comparison.   Left ventricular ejection fraction is visually estimated to be 60%.   Normal diastolic  function. Poor endocardial definition difficult to   assess wall motion.  No evidence of valvular abnormality based on Doppler evaluation.   Estimated right ventricular systolic pressure  is 25 mmHg.    Echo 7/25/19  Normal left ventricular systolic function. Left ventricular ejection   fraction is visually estimated to be 55%.   Normal diastolic function.    Renal art duplex 7/31/19  Unremarkable renal sonogram with no evidence of renal artery stenosis as demonstrated above             Javad Childs M.D.   [facility]   [facility]   [phone]

## 2021-12-10 NOTE — PROGRESS NOTES
Target end date: Indefinite      Indication: Afib     Drug: Xarelto 20mg daily     CHADsVASC = 5 (age, HTN, DM2, CAD)     Health Status Since Last Assessment  Patient denies any new relevant medical problems, ED visits or hospitalizations  Patient denies any embolic events (stroke/tia/systemic embolism)    Adherence with DOAC Therapy  Pt has NOT missed any doses in the average week    Bleeding Risk Assessment    Epistaxis  Pt denies any excessive or unusual bleeding/hematomas.  Pt denies any GI bleeds or hematemesis.  Pt denies any concerning daily headache or sub dural hematoma symptoms.    Pt denies any hematuria    8/26/2019 11:05   Hemoglobin 16.4     ETOH overuse One drink once per year     Creatinine Clearance/Renal Function    Latest CrCl 52 ml/min   12/3/2021 07:22   Creatinine 1.3       Hepatic function   12/3/2021 07:22   AST(SGOT) 42 (H)   ALT(SGPT) 56     Pt denies any history of liver dysfunction      Drug Interactions   8/26/2019 11:05   Platelet Count 195     ASA/other antiplatelets None  NSAID None  Other drug interactions None  Verified no anticonvulsant or azole therapy, education provided for future use.     Examination  Blood Pressure 158/75  Symptomatic hypotension Yes, patient reports request dizzy spells and imbalance of bait. Patient states that has walkers and canes around the house, but does not like to use them.   Significant gait impairment/imbalance/high risk for falls? Yes, see notes above.    Final Assessment and Recommendations:  · Patient appears stable from the anticoagulation standpoint.    · Benefits of continued DOAC therapy outweigh risks for this patient  · Recommend pt continue with current DOAC therapy  · DOAC is NOT affordable - Patient states that in the donut hole right now. Provided pt w/ 3 weeks worth of samples - he will continue w/ Xarelto until cards f/u at the end of the month. Pt will no longer be in the donut hole at the beginning of the new year. Discussed  potential transition to warfarin at that time - pt opts to further discuss after cards f/u.  · Of note - pt's renal function is borderline for Xarelto dose-adjustment. Pending updated labs and if DOAC is continued, will decrease Xarelto to 15 mg once daily upon f/u.     Other Actions: cmp/ cbc hemogram ordered prior to next visit    Follow up:  Will follow up with patient 1 month     Brandy Torres, Pharmacy Intern  Jeff Rangel, PharmD, BCACP

## 2021-12-13 PROBLEM — R26.89 BALANCE DISORDER: Status: ACTIVE | Noted: 2021-12-13

## 2021-12-13 PROBLEM — N39.41 URGE INCONTINENCE: Status: ACTIVE | Noted: 2021-12-13

## 2022-01-04 ENCOUNTER — ANTICOAGULATION MONITORING (OUTPATIENT)
Dept: VASCULAR LAB | Facility: MEDICAL CENTER | Age: 77
End: 2022-01-04

## 2022-01-04 DIAGNOSIS — I48.0 PAROXYSMAL ATRIAL FIBRILLATION (HCC): ICD-10-CM

## 2022-01-19 PROBLEM — N32.81 OAB (OVERACTIVE BLADDER): Status: ACTIVE | Noted: 2022-01-19

## 2022-01-19 PROBLEM — Z79.01 CHRONIC ANTICOAGULATION: Status: ACTIVE | Noted: 2022-01-19

## 2022-02-01 DIAGNOSIS — E78.5 DYSLIPIDEMIA: ICD-10-CM

## 2022-02-01 RX ORDER — ALIROCUMAB 150 MG/ML
1 INJECTION, SOLUTION SUBCUTANEOUS
Qty: 6 ML | Refills: 1 | Status: SHIPPED | OUTPATIENT
Start: 2022-02-01 | End: 2022-04-29

## 2022-02-08 ENCOUNTER — DOCUMENTATION (OUTPATIENT)
Dept: VASCULAR LAB | Facility: MEDICAL CENTER | Age: 77
End: 2022-02-08

## 2022-02-08 ENCOUNTER — TELEPHONE (OUTPATIENT)
Dept: VASCULAR LAB | Facility: MEDICAL CENTER | Age: 77
End: 2022-02-08

## 2022-02-08 NOTE — TELEPHONE ENCOUNTER
I have contacted the patient at [phone] to confirm the patient's Medicare Part D insurance. I was able to leave a voicemail for the patient. Unable to process a PA until insurance is confirmed.

## 2022-02-08 NOTE — PROGRESS NOTES
Attempted to process a PA in [facility].  The patient's plan does not find an active patient.    Wang Forman Maxwell: [id number]

## 2022-02-09 ENCOUNTER — TELEPHONE (OUTPATIENT)
Dept: VASCULAR LAB | Facility: MEDICAL CENTER | Age: 77
End: 2022-02-09

## 2022-02-09 NOTE — TELEPHONE ENCOUNTER
I have spoken with the patient. He will contact his insurance carrier for the updated pharmacy processing information.

## 2022-02-10 ENCOUNTER — DOCUMENTATION (OUTPATIENT)
Dept: VASCULAR LAB | Facility: MEDICAL CENTER | Age: 77
End: 2022-02-10

## 2022-02-11 NOTE — PROGRESS NOTES
Received documentation of denial of PA for praluent due to formulary not receiving documentation of indication.    psck9i clearly indicated given h/o ascvd (s/p cabg in 2013) with poorly controlled ldl despite max tolerated statin therapy.     Will ask PA specialists to appeal.    Michael J Bloch, MD  Certified Clinical Lipid Specialist  Medial Director, [facility]    Cc:

## 2022-02-14 ENCOUNTER — DOCUMENTATION (OUTPATIENT)
Dept: VASCULAR LAB | Facility: MEDICAL CENTER | Age: 77
End: 2022-02-14

## 2022-02-14 NOTE — PROGRESS NOTES
[facility]    REFERENCE # [id number]  PATIENT NAME: Wang Forman Jr.  PATIENT : [id number]  PATIENT ID: [id number]  MEDICATION NAME: Praluent Inj 75/ML    Submitted Rx Appeal via fax to [facility]  [phone]

## 2022-03-03 ENCOUNTER — DOCUMENTATION (OUTPATIENT)
Dept: VASCULAR LAB | Facility: MEDICAL CENTER | Age: 77
End: 2022-03-03
Payer: COMMERCIAL

## 2022-03-04 NOTE — PROGRESS NOTES
Received documentation from [facility] that problem was approved on appeal  At follow-up visit next week we will ensure the patient was able to  with affordable out-of-pocket cost    Michael J Bloch, MD  Certified Clinical Lipid Specialist  Medial Director, [facility]

## 2022-03-11 ENCOUNTER — OFFICE VISIT (OUTPATIENT)
Dept: VASCULAR LAB | Facility: MEDICAL CENTER | Age: 77
End: 2022-03-11
Attending: FAMILY MEDICINE
Payer: COMMERCIAL

## 2022-03-11 VITALS
HEIGHT: 66 IN | HEART RATE: 63 BPM | SYSTOLIC BLOOD PRESSURE: 128 MMHG | WEIGHT: 213.8 LBS | BODY MASS INDEX: 34.36 KG/M2 | DIASTOLIC BLOOD PRESSURE: 70 MMHG

## 2022-03-11 DIAGNOSIS — I48.0 PAROXYSMAL ATRIAL FIBRILLATION (HCC): ICD-10-CM

## 2022-03-11 DIAGNOSIS — I1A.0 RESISTANT HYPERTENSION: ICD-10-CM

## 2022-03-11 DIAGNOSIS — I25.10 CORONARY ARTERY DISEASE INVOLVING NATIVE HEART WITHOUT ANGINA PECTORIS, UNSPECIFIED VESSEL OR LESION TYPE: ICD-10-CM

## 2022-03-11 DIAGNOSIS — E78.5 DYSLIPIDEMIA: ICD-10-CM

## 2022-03-11 DIAGNOSIS — Z79.01 CHRONIC ANTICOAGULATION: ICD-10-CM

## 2022-03-11 DIAGNOSIS — Z79.02 LONG TERM (CURRENT) USE OF ANTITHROMBOTICS/ANTIPLATELETS: ICD-10-CM

## 2022-03-11 DIAGNOSIS — E78.1 HYPERTRIGLYCERIDEMIA: ICD-10-CM

## 2022-03-11 DIAGNOSIS — E11.9 TYPE 2 DIABETES MELLITUS WITHOUT COMPLICATION, WITHOUT LONG-TERM CURRENT USE OF INSULIN (HCC): ICD-10-CM

## 2022-03-11 DIAGNOSIS — Z95.1 HX OF CABG: ICD-10-CM

## 2022-03-11 DIAGNOSIS — G47.33 OBSTRUCTIVE SLEEP APNEA SYNDROME: ICD-10-CM

## 2022-03-11 PROCEDURE — 99214 OFFICE O/P EST MOD 30 MIN: CPT | Performed by: FAMILY MEDICINE

## 2022-03-11 PROCEDURE — 99212 OFFICE O/P EST SF 10 MIN: CPT

## 2022-03-11 RX ORDER — OXYBUTYNIN CHLORIDE 5 MG/1
5 TABLET, EXTENDED RELEASE ORAL DAILY
COMMUNITY
Start: 2022-02-22 | End: 2023-12-08 | Stop reason: SDUPTHER

## 2022-03-11 RX ORDER — AMOXICILLIN 500 MG/1
CAPSULE ORAL
COMMUNITY
Start: 2022-01-26 | End: 2023-12-24

## 2022-03-11 RX ORDER — CHLORHEXIDINE GLUCONATE ORAL RINSE 1.2 MG/ML
SOLUTION DENTAL
COMMUNITY
Start: 2022-01-26

## 2022-03-11 ASSESSMENT — ENCOUNTER SYMPTOMS
CHILLS: 0
FEVER: 0
COUGH: 0
DIZZINESS: 1
LOSS OF CONSCIOUSNESS: 0
WEAKNESS: 0
SPEECH CHANGE: 0
MYALGIAS: 0
PALPITATIONS: 0
NAUSEA: 0
ORTHOPNEA: 0
BRUISES/BLEEDS EASILY: 0
CLAUDICATION: 0
SEIZURES: 0
FOCAL WEAKNESS: 0
HEADACHES: 0

## 2022-03-11 ASSESSMENT — FIBROSIS 4 INDEX: FIB4 SCORE: 1.96

## 2022-03-11 NOTE — PROGRESS NOTES
[facility] - Follow-up   03/11/22     Assessment / Plan:     Patient type:  Secondary prevention     1. Blood Pressure Control:  Qualifies for resistant HTN due to 4 meds w/o control   ACC/AHA (2017) goal <130/80, consider less stringent goals based upon pt preference and polypharmacy potential, though <140/80 at the most    Home BP at goal:  yes   Office BP at goal: yes   24h ABPM (12/2019): Mean daytime: 146/75 consistent with poorly controlled out of office systolic blood pressure. Nocturnal dip: Appears normal   Device candidate? No    -Wider pulse pressure indicative of arteriosclerosis  Plan:   Monitoring:   - continue home BP monitoring, reviewed correct technique:  Yes   - order 24h ABPM:  Ordered today   - monitor lytes/gfr routinely     2. Work up of Secondary Causes of Resistant Hypertension:   Renovascular HTN: Excluded  Primary Aldosteronism: Excluded, high renin  Thyroid Function: Excluded  Obstructive Sleep Apnea: using BiPAP, reports good compliance, pending with pulm MD in 2-3 weeks, minimal good nights sleep   Pheochromocytoma: Not Yet Assessed  Other: none  Recommendations At This Visit:        3. Medication Use / Adherence:  Assessment: Complete  Recommendations: Instructed to Continue Taking All Medications As Prescribed         4. End Organ Damage:   Left Ventricular Hypertrophy: Absent on  Echocardiogram Date: 2017  Albuminuria: Microalbuminuria, level A2 on Date: 10/2018  Renal Function: Chronic Kidney Disease  Stage 3a   - avoid nephrotoxins  - continue HTN control with RAAS blockade   - monitor lytes/gfr routinely  - eval with nephrology if worsening over time    Established Cardiovascular Disease:    # CAD - s/p CABG, 12/2013, stable  still in high risk stage <10yrs from operation   - continue secondary prevention med mgmt, psck9i needed   - ongoing care with cardiology    # Afib - hx of paroxysmal Afib post-op CABG, recurrent   Appears to be contemporaneous to the vascepa  initiation  No current, has been off BB and xarelto in 2013.   - continue diltiazem and OAC for CVA proph    - stopped vascepa   - Continue monitoring per cardiology    5. Lifestyle Recommendations:    Smoking: continued complete avoidance of all tobacco products     Physical Activity: continue healthy activity to improve CV fitness, see care instructions for additional details     Weight Management and Nutrition: Dietary plan was discussed with patient at this visit including DASH, low sodium and/or as outlined in care instructions     6. Standard HTN Pharmacotherapy:  ACEI/ARB: continue trandolapril 4mg QHS for true 24h coverage  Thiazide Type Diuretic: stop due to worsening ED  Calcium Channel Blocker (CCB): continue amlodipine 5mg daily - aware on nonDHP-CCB too     7. Additional Agents:  Aldosterone Antagonist: eplerenone 50mg BID, consider reduction to 25mg BID in future   Loop Diuretic: none   Peripheral Alpha Blocker: continue doxazosin to 2mg QHS, avoid higher dosing due to dizziness   Other CCB: continue diltiazem 180mg ER daily to reduce fatigue while providing rate-control for Afib - aware of on low dose amlodipine as well   Direct Vasodilator: none  Centrally Acting Alpha Agonist: prefer to avoid clonidine due to rebound HTN effects, will consider prazosin if BP spikes start occurring   Other:   BB: stop metoprolol due to positional dizz, lowHR      8. Other CV Risk Factors:     Lipid management:   -Myalgias on rosuva and 1/2 dose pravastatin. Has failed simva, atorva, prava.    -PSCK9i indicated per 2018 ACC/AHA guidelines in this patient with established ASCVD whose LDL-C remains above threshold of 70 mg/dl despite maximally tolerated statin therapy.  -Praluent tolerated, PA approved for 2020. Excellent response to pcsk9i  -Stopped zetia as LDL at goal with pcsk9i alone  -Meets criteria for Vascepa based upon hx of CAD, s/p CABG, T2D, trigs >135 already on max-tolerated statin (unable to tolerate) -  "will further reduce secondary outcomes  -persistent hypertrig despite vascepa - may have induced Afib, so stopped    Tx goal: non-HDL-C <100,  LDL-C <70  (optional: apoB <80)   At goal:  Yes, direct LDL = 33, 6/3/21   Plan:  - reinforced ongoing TLC measures   - monitor labs Q6mo - update labs Q6mo if stable - track direct LDLc due to very low calc LDLc   Meds:   - continue praluent 150mg daily  - continue vitamin D3 [id number] daily     DM: type 2, non-insulin  Goal a1c <7.0  Lab Results   Component Value Date    HBA1C 6.7 (H) 06/03/2021     Lab Results   Component Value Date/Time    MALBCRT 5 08/12/2020 11:50 AM    MICROALBUR 7.1 08/12/2020 11:50 AM      invokana too expensive   Does not seem to be greg Metformin more that once daily  Plan:  - continue acarbose for now  - metformin 500 mg Qpm due to SE unable to start ER form.     - holding Invokana 100 mg for now due to expense, consider restart if A1c elevates switch to Jardiance or Farxiga if cost is better - pt to call us   - consider glp1RA weekly as 3rd agent   - recommmend for routine care with PCP (or endocrine) to include regular A1c monitoring, annual albumin/creatinine ratio (ACR), annual diabetic retinopathy screening, foot exams, annual flu vaccine, and updates to pneumonia vaccines as appropriate     Smoking: ended year 1987 or pack years of use 25    ANTITHROMBOTIC THERAPY:   - ok to transition from xarelto to VKA with INR mgmt per [facility] clinic - pharmacist to meet with pt today - main   is cost of DOAC and \"donut hole\"   - he is advised they are non-inferior to each other but there are increase INR testing and dietary restrictions   - if stable and no recurrent afib    9. Other Issues:    # hypogonadism, erectile dysfunction, stable and improved off thiazide and spironolactone.  Pt aware of adverse effects of testosterone on lipids  - defer mgmt to PCP and/or urology/endocrine     # dizziness and abdominal uneasiness   - as reviewed with pt this " "could be multifactorial or contributed to a specific condition vs medication   - current pattern does not necessarily fit a med ADRs nor HTN related that I am able to discern.    - most recent echo normal, Afib and HR in stable range,  BP excursions are limited and ranges would not traditionally be implicated in dizziness causation   - we reviewed that the following meds could be problematic:    Dizziness - diltiazem, eplerenone, doxazosin - pt to consider a 1 week \"drug holiday\" off each med sequential and determine if any improvements in dizz episodes and then contact our office to consider alternatives   - could use low dose BB for dilt  - could use amiloride for eplerenone     Studies Ordered At This Visit:  None   Blood Work to Be Obtained Prior to Next Visit:  As noted   Disposition: 6mo, sooner prn     Diagnosis:  1. Dyslipidemia     2. Resistant hypertension     3. Paroxysmal atrial fibrillation (HCC)     4. Coronary artery disease involving native heart without angina pectoris, unspecified vessel or lesion type     5. Hx of CABG     6. Hypertriglyceridemia     7. Obstructive sleep apnea syndrome     8. Long term (current) use of antithrombotics/antiplatelets     9. Type 2 diabetes mellitus without complication, without long-term current use of insulin (HCC)     10. Chronic anticoagulation       Subjective        History of Present Illness:   Wang Forman Jr. is a male seen for evaluation of resistant HTN and management.   Wang Forman Jr. is referred by:cardiologist Scott Cameron MD    HTN:  Tolerating meds, still having intermittent dizziness spells that are not triggered by a specific event or med administration per pt report.  No associated sz, no LOC, no CP, SOB.  Denies edema.    Home BP log: stable at his baseline 140s/80s most days, occ 150s/80s   Adherence to current HTN meds: compliant all of the time     Afib, new episode 2021:   On dilt, xarelto per cardiology  - had " new onset Afib after vascepa initiation.  Last episode was 2013     Antiplatelet/anticoagulation:  Transitioned to VKA due to xarelto to costly- stable, no bleeding, getting ongoing care of INR mgmt at [facility] cardiology     Hyperlipidemia:    Tolerating praluent no issues - using Q1mo instead of Q2wks.  Had labs done       T2D:  On acarbose and metformin, no concerns.     Last A1c   Lab Results   Component Value Date    HBA1C 6.7 (H) 06/03/2021     CHOCO:   Still daily somnolence and reduced energy. feels poorly rested daily, currently on Bipap, seening pulm MD.    CAD, s/p CABG 2013 :   Stable, no sx, seeing cardiology ongoing       Current Outpatient Medications:   •  oxybutynin SR, 5 mg, Oral, DAILY, Taking  •  Praluent, 1 PEN, Subcutaneous, Q14 DAYS, Taking  •  warfarin, Take 1 tab 3 days a week, alternating with 7.5mg tabs. (Patient taking differently: Take 5mg 5 days per week, 7.5 mg 2 days per week), Taking  •  Eplerenone, Take 1 tablet by mouth twice daily, Taking  •  trandolapril, TAKE 1 TABLET BY MOUTH ONCE DAILY IN THE EVENING, Taking  •  doxazosin, 2 mg, Oral, DAILY, Taking  •  acetaminophen, 500-1,000 mg, Oral, Q6HRS PRN, PRN  •  metFORMIN, Take 1 tablet by mouth once daily, Taking  •  Acetaminophen-Codeine, , PRN  •  doxycycline, Take 50 mg by mouth., Taking  •  HYDROcodone-acetaminophen, TAKE 1 TABLET BY MOUTH EVERY 6 HOURS AS NEEDED FOR MODERATE TO SEVERE POST PROCEDURE PAIN FOR 5 DAYS, PRN  •  traMADol, TAKE 1 TABLET BY MOUTH TWICE DAILY AS NEEDED FOR MODERATE TO SEVERE PAIN, PRN  •  diltiazem, 180 mg, Oral, DAILY, Taking  •  amLODIPine, 5 mg, Oral, QHS, Taking  •  pantoprazole, Take 1 tablet by mouth once daily, Taking  •  acarbose, Take 1 tablet by mouth twice daily, Taking  •  vitamin D3, 400 Units, Oral, DAILY, Taking  •  therapeutic multivitamin-minerals, 1 Tablet, Oral, DAILY, Taking  •  testosterone cypionate, 200 mg, Intramuscular, Q21 DAYS, Taking  •  amoxicillin, TAKE 2 CAPSULES BY  "MOUTH NOW AND THEN 1 CAPSULE THREE TIMES DAILY UNTIL GONE (Patient not taking: Reported on 3/11/2022), Not Taking  •  chlorhexidine, SWISH AND SPIT WITH 15 ML BY MOUTH TWICE DAILY FOR 30 SECONDS (Patient not taking: Reported on 3/11/2022), Not Taking  •  nitroglycerin, 1 tablet Sublingual As directed for angina (Patient not taking: Reported on 3/11/2022), Not Taking      Social History:     Social History     Tobacco Use   • Smoking status: Former Smoker     Packs/day: 2.00     Years: 25.00     Pack years: 50.00     Types: Cigarettes     Quit date: 1987     Years since quittin.2   • Smokeless tobacco: Never Used   Vaping Use   • Vaping Use: Never used   Substance Use Topics   • Alcohol use: Not Currently     Alcohol/week: 0.0 oz     Comment: a few times a year   • Drug use: No       Review of Systems:   Review of Systems   Constitutional: Negative for chills and fever.   Respiratory: Negative for cough.    Cardiovascular: Negative for chest pain, palpitations, orthopnea, claudication and leg swelling.   Gastrointestinal: Negative for nausea.   Musculoskeletal: Negative for myalgias.   Neurological: Positive for dizziness (at baseline, mild better). Negative for speech change, focal weakness, seizures, loss of consciousness, weakness and headaches.   Endo/Heme/Allergies: Does not bruise/bleed easily.        Objective        Objective:     Vitals:    22 1020   BP: 128/70   BP Location: Left arm   Patient Position: Sitting   BP Cuff Size: Adult   Pulse: 63   Weight: 97 kg (213 lb 12.8 oz)   Height: 1.676 m (5' 6\")     BP Readings from Last 5 Encounters:   22 128/70   22 134/70   21 142/64   12/10/21 158/75   12/10/21 152/71      Body mass index is 34.51 kg/m².    Physical Exam  Constitutional:       General: He is not in acute distress.     Appearance: He is well-developed. He is not diaphoretic.   HENT:      Head: Normocephalic.   Eyes:      Conjunctiva/sclera: Conjunctivae normal. "   Cardiovascular:      Rate and Rhythm: Normal rate.      Pulses: Normal pulses.      Heart sounds: Normal heart sounds.   Pulmonary:      Effort: Pulmonary effort is normal. No respiratory distress.   Musculoskeletal:      Right lower leg: No edema.      Left lower leg: No edema.   Skin:     Coloration: Skin is not pale.      Findings: No rash.   Neurological:      Mental Status: He is alert and oriented to person, place, and time.      Cranial Nerves: No cranial nerve deficit.   Psychiatric:         Behavior: Behavior normal.          Accessory Clinical Findings:   Echocardiogram:  Results for orders placed or performed during the hospital encounter of 09/26/17   ECHOCARDIOGRAM COMP W/O CONT   Result Value Ref Range    Eject.Frac. MOD BP 57.79     Eject.Frac. MOD 4C 56.25     Eject.Frac. MOD 2C 62.39       Lab Results   Component Value Date    CHOLSTRLTOT 111 (L) 12/03/2021    LDL 26 12/03/2021    HDL 31.0 (L) 12/03/2021    TRIGLYCERIDE 268 (H) 12/03/2021         No results found for: LIPOPROTA   No results found for: APOB      Lab Results   Component Value Date    HBA1C 6.7 (H) 06/03/2021      Lab Results   Component Value Date    SODIUM 138 12/03/2021    POTASSIUM 4.4 12/03/2021    CHLORIDE 104 12/03/2021    CO2 23 12/03/2021    GLUCOSE 151 (H) 12/03/2021    BUN 14 12/03/2021    CREATININE 1.3 12/03/2021          Lab Results   Component Value Date    URCREAT 148 08/12/2020    MICROALBUR 7.1 08/12/2020    MALBCRT 5 08/12/2020        Ref. Range 7/1/2019 13:20 7/23/2019 07:25 8/26/2019 11:05   CPK Total Latest Ref Range: 39 - 308 U/L 453 (H) 170 240     VASCULAR IMAGING:     Echo 2017  No prior study is available for comparison.   Left ventricular ejection fraction is visually estimated to be 60%.   Normal diastolic function. Poor endocardial definition difficult to   assess wall motion.  No evidence of valvular abnormality based on Doppler evaluation.   Estimated right ventricular systolic pressure  is 25  mmHg.    Echo 7/25/19  Normal left ventricular systolic function. Left ventricular ejection   fraction is visually estimated to be 55%.   Normal diastolic function.    Renal art duplex 7/31/19  Unremarkable renal sonogram with no evidence of renal artery stenosis as demonstrated above             Javad Childs M.D.   [facility]   [facility]   [phone]

## 2022-03-14 ENCOUNTER — TELEPHONE (OUTPATIENT)
Dept: VASCULAR LAB | Facility: MEDICAL CENTER | Age: 77
End: 2022-03-14
Payer: COMMERCIAL

## 2022-03-14 NOTE — TELEPHONE ENCOUNTER
I have provided outreach to the patient. He isn't available at this time. I have left my contacted information with his spouse.     This is a new insurance for the patient. Many plans will required additional clinical labs/supporting documentation to be re-submitted within the 6 months to determine if the patient condition is improving on treatment. At that time, a year approval may be granted.

## 2022-03-15 ENCOUNTER — TELEPHONE (OUTPATIENT)
Dept: VASCULAR LAB | Facility: MEDICAL CENTER | Age: 77
End: 2022-03-15
Payer: COMMERCIAL

## 2022-03-15 NOTE — TELEPHONE ENCOUNTER
Rescheduled patient today per below instructions. Patient preferred later July as opposed to early August.     ----- Message -----  From: Javad Chang PharmD  Sent: 3/15/2022   8:00 AM PDT  To: [facility]    Can you please reschedule pt for July or August (preferably early august)  THey need to return so [employer] can provide information to extend how long insurance will provide the medication.       ----- Message -----  From: Carmen Beckwith  Sent: 3/14/2022   3:21 PM PDT  To: Nasreen Gomez,    Yes, please and thank you. It will allow for PA to be extended for more than 6 months.      Thank you,  Carmen  ----- Message -----  From: Javad Chang PharmD  Sent: 3/14/2022   3:12 PM PDT  To: Carmen Beckwith    Should we schedule sooner follow up than September to make sure we get the information you need for the approval beyond 6 months?    Javad   ----- Message -----  From: Carmen Beckwith  Sent: 3/14/2022   9:34 AM PDT  To: [facility], #    Hello All,    I have provided outreach to the patient. He isn't available at this time. I have left my contacted information with his spouse.     This is a new insurance for the patient. Many plans will required additional clinical labs/supporting documentation to be re-submitted within the 6 months to determine if the patient condition is improving on treatment. At that time, a year approval may be granted.      Thank you,  [employer]  ----- Message -----  From: Javad Childs M.D.  Sent: 3/11/2022  10:47 AM PDT  To: [facility], #    Kaysi   Patient voices concern about only approved on Praluent through 8/13/22.  Remains unclear why not approved for the calendar year?      Can we please update patient on what is found out.

## 2022-05-02 DIAGNOSIS — E78.5 DYSLIPIDEMIA: ICD-10-CM

## 2022-05-03 RX ORDER — ALIROCUMAB 150 MG/ML
INJECTION, SOLUTION SUBCUTANEOUS
Qty: 6 ML | Refills: 2 | Status: SHIPPED | OUTPATIENT
Start: 2022-05-03 | End: 2022-11-28

## 2022-06-01 DIAGNOSIS — I48.0 PAROXYSMAL ATRIAL FIBRILLATION (HCC): ICD-10-CM

## 2022-06-01 RX ORDER — DILTIAZEM HYDROCHLORIDE 180 MG/1
CAPSULE, EXTENDED RELEASE ORAL
Qty: 90 CAPSULE | Refills: 0 | Status: SHIPPED | OUTPATIENT
Start: 2022-06-01 | End: 2022-09-02

## 2022-07-19 DIAGNOSIS — I1A.0 RESISTANT HYPERTENSION: ICD-10-CM

## 2022-07-19 RX ORDER — AMLODIPINE BESYLATE 5 MG/1
5 TABLET ORAL
Qty: 90 TABLET | Refills: 3 | Status: SHIPPED | OUTPATIENT
Start: 2022-07-19 | End: 2022-11-11 | Stop reason: SDUPTHER

## 2022-07-28 ENCOUNTER — OFFICE VISIT (OUTPATIENT)
Dept: VASCULAR LAB | Facility: MEDICAL CENTER | Age: 77
End: 2022-07-28
Attending: FAMILY MEDICINE
Payer: COMMERCIAL

## 2022-07-28 VITALS
SYSTOLIC BLOOD PRESSURE: 130 MMHG | BODY MASS INDEX: 36.17 KG/M2 | WEIGHT: 217.06 LBS | HEIGHT: 65 IN | HEART RATE: 69 BPM | DIASTOLIC BLOOD PRESSURE: 73 MMHG

## 2022-07-28 DIAGNOSIS — Z79.01 CHRONIC ANTICOAGULATION: ICD-10-CM

## 2022-07-28 DIAGNOSIS — E88.810 METABOLIC SYNDROME: ICD-10-CM

## 2022-07-28 DIAGNOSIS — E78.5 DYSLIPIDEMIA: ICD-10-CM

## 2022-07-28 DIAGNOSIS — Z95.1 HX OF CABG: ICD-10-CM

## 2022-07-28 DIAGNOSIS — I48.0 PAROXYSMAL ATRIAL FIBRILLATION (HCC): ICD-10-CM

## 2022-07-28 DIAGNOSIS — E11.9 TYPE 2 DIABETES MELLITUS WITHOUT COMPLICATION, WITHOUT LONG-TERM CURRENT USE OF INSULIN (HCC): ICD-10-CM

## 2022-07-28 DIAGNOSIS — I1A.0 RESISTANT HYPERTENSION: ICD-10-CM

## 2022-07-28 DIAGNOSIS — E78.2 MIXED HYPERLIPIDEMIA: ICD-10-CM

## 2022-07-28 DIAGNOSIS — G47.33 OBSTRUCTIVE SLEEP APNEA SYNDROME: ICD-10-CM

## 2022-07-28 DIAGNOSIS — I25.10 CORONARY ARTERY DISEASE INVOLVING NATIVE HEART WITHOUT ANGINA PECTORIS, UNSPECIFIED VESSEL OR LESION TYPE: ICD-10-CM

## 2022-07-28 PROBLEM — E66.811 CLASS 1 OBESITY: Status: ACTIVE | Noted: 2022-07-28

## 2022-07-28 PROBLEM — E66.9 CLASS 1 OBESITY: Status: ACTIVE | Noted: 2022-07-28

## 2022-07-28 PROCEDURE — 99212 OFFICE O/P EST SF 10 MIN: CPT

## 2022-07-28 PROCEDURE — 99214 OFFICE O/P EST MOD 30 MIN: CPT | Performed by: FAMILY MEDICINE

## 2022-07-28 RX ORDER — MECLIZINE HYDROCHLORIDE 25 MG/1
TABLET ORAL
COMMUNITY
Start: 2022-07-12 | End: 2023-08-24

## 2022-07-28 ASSESSMENT — ENCOUNTER SYMPTOMS
FEVER: 0
WEAKNESS: 0
NAUSEA: 0
PALPITATIONS: 0
MYALGIAS: 0
CHILLS: 0
CLAUDICATION: 0
ORTHOPNEA: 0
BRUISES/BLEEDS EASILY: 0
COUGH: 0

## 2022-07-28 ASSESSMENT — FIBROSIS 4 INDEX: FIB4 SCORE: 1.24

## 2022-07-28 NOTE — PROGRESS NOTES
v   [facility] - Follow-up   12/10/21      Assessment / Plan:   1. Blood Pressure Control:  Qualifies for resistant HTN due to 4 meds w/o control   ACC/AHA (2017) goal <130/80, consider less stringent goals based upon pt preference and polypharmacy potential, though <140/80 at the most    Home BP at goal:  yes   Office BP at goal: yes   24h ABPM (12/2019): Mean daytime: 146/75 consistent with poorly controlled out of office  systolic blood pressure. Nocturnal dip: Appears normal   Device candidate? No    -Wider pulse pressure indicative of arteriosclerosis - improved   Plan:   Monitoring:   - continue home BP monitoring, reviewed correct technique:  Yes   - order 24h ABPM:  no  - monitor lytes/gfr routinely     2. Work up of Secondary Causes of Resistant Hypertension:   Renovascular HTN: Excluded  Primary Aldosteronism: Excluded, high renin  Thyroid Function: Excluded     CHOCO on CPAP  Strongly encouraged CPAP adherence to reduce RV strain, improve overall fatigue symptoms, and improve BP in both the short- and long-term as per HIPARCO (2013) and HIPARCO-2 (2021) studies.   - continue CPAP, defer mgmt to sleep MD    Pheochromocytoma: Not Yet Assessed  Other: none  Recommendations At This Visit:        3. Medication Use / Adherence:  Assessment: Complete  Recommendations: Instructed to Continue Taking All Medications As Prescribed         4. End Organ Damage:   Left Ventricular Hypertrophy: Absent on  Echocardiogram Date: 2017  Albuminuria: Microalbuminuria, level A2 on Date: 10/2018  Renal Function: Chronic Kidney Disease  Stage 3a   - avoid nephrotoxins  - continue HTN control with RAAS blockade   - monitor lytes/gfr routinely  - eval with nephrology if worsening over time    Established Cardiovascular Disease:    # CAD - s/p CABG, 12/2013, stable  still in high risk stage <10yrs from operation   - continue aggressive med mgmt, qualifies for pcsk9i  - ongoing care with cardiology    # Afib - hx of  paroxysmal Afib post-op CABG, recurrent   Appears to be contemporaneous to the vascepa initiation  No current, has been off BB and xarelto in 2013.   - continue diltiazem and OAC for CVA proph    - Continue monitoring per cardiology    5. Lifestyle Recommendations:    SMOKING:   reports that he quit smoking about 35 years ago. His smoking use included cigarettes. He has a 50.00 pack-year smoking history. He has never used smokeless tobacco. - continued complete avoidance of all tobacco products     PHYSICAL ACTIVITY: continue healthy activity to improve CV fitness.  In general, targeting >150min/week of moderate-level activity.      WEIGHT MANAGEMENT AND NUTRITION: Mediterranean style dietary approach       6. Standard HTN Pharmacotherapy:  -continue trandolapril 4mg QHS for true 24h coverage  Thiazide Type Diuretic: stop due to worsening ED  -continue amlodipine 5mg daily - aware on nonDHP-CCB too     7. Additional Agents:  - continue eplerenone 50mg BID, consider reduction to 25mg BID in future   Loop Diuretic: none   - continue doxazosin to 2mg QHS 1 full tab, can then consider increase to 4mg QHS  -continue diltiazem 180mg ER daily to reduce fatigue while providing rate-control for Afib   Direct Vasodilator: none  Centrally Acting Alpha Agonist: prefer to avoid clonidine due to rebound HTN effects, will consider prazosin if BP spikes start occurring   Other:   BB: stop metoprolol due to positional dizz, low HR      8. Other CV Risk Factors:     Lipid management:   Secondary prevention   -Myalgias on rosuva and 1/2 dose pravastatin. Has failed simva, atorva, prava.    -PSCK9i indicated per 2018 ACC/AHA guidelines in this patient with established ASCVD whose LDL-C remains above threshold of 70 mg/dl despite maximally tolerated statin therapy.  -Praluent tolerated, PA approved since 2020, excellent response to pcsk9i  -Stopped zetia as LDL at goal with pcsk9i alone  -persistent hypertrig despite vascepa - stopped  vascepa due to intolerance and new onset Afib   Tx goal: non-HDL-C <100,  LDL-C <70  (optional: apoB <80)   At goal:  Yes, 7/2022  Plan:  - reinforced ongoing TLC measures   - monitor labs Q6mo - update labs Q6mo if stable - track direct LDLc due to very low calc LDLc   Meds:   - continue praluent 150mg daily  - continue vitamin D3 1792-9353 daily     DM: type 2, non-insulin  Lab Results   Component Value Date    HBA1C 6.7 (H) 06/03/2021     Goal A1c <7.0%   ACR: abnormal: A2  invokana too expensive   Does not seem to be greg Metformin more that once daily  Plan:  - continue acarbose for now  - metformin 500 mg Qpm due to SE unable to start ER form.     - holding Invokana 100 mg for now due to expense, consider restart if A1c elevates switch to Jardiance or Farxiga if cost is better  - consider GLP1RA weekly as 3rd agent   - recommmend for routine care with PCP (or endocrine) to include regular A1c monitoring, annual albumin/creatinine ratio (ACR), annual diabetic retinopathy screening, foot exams, annual flu vaccine, and updates to pneumonia vaccines as appropriate     Smoking: ended year 1987 or pack years of use 25    ANTITHROMBOTIC THERAPY:   - continue VKA and INR mgmt per PCP's office   - if low TTR%, recommend to eat consistent, small amount of Vit K food daily to establish baseline Vit D to reduce fluctuations   - if stable and no recurrent afib    9. Other Issues:    # hypogonadism, erectile dysfunction, stable and improved off thiazide and spironolactone.  Pt aware of adverse effects of testosterone on lipids  - defer mgmt to PCP and/or urology/endocrine     # vertigo, episodic with hearing loss   - as reviewed with pt this could be multifactorial or contributed to a specific condition vs medication   - current pattern does not necessarily fit a med ADRs nor HTN related that I am able to discern.    - most recent echo normal, Afib and HR in stable range,  BP excursions are limited and ranges would not  "traditionally be implicated in dizziness causation   - we reviewed that the following meds could be problematic:    Causes of stomach uneasiness, nausea - metformin, doxycycline, acarbose   Dizziness - amlodipine, diltiazem, eplerenone  -We can consider a \"drug holiday\" for any of these meds in the future where we stop a med for 1-2 weeks but I recommend eval with PCP 1st further further evaluation of his sx      Studies Ordered At This Visit:  None   Blood Work to Be Obtained Prior to Next Visit:  As noted   Disposition: 6mo      Diagnosis:  1. Resistant hypertension     2. Dyslipidemia     3. Paroxysmal atrial fibrillation (HCC)     4. Coronary artery disease involving native heart without angina pectoris, unspecified vessel or lesion type     5. Hx of CABG     6. Obstructive sleep apnea syndrome     7. Type 2 diabetes mellitus without complication, without long-term current use of insulin (HCC)     8. Chronic anticoagulation     9. Metabolic syndrome           Subjective     Wang Forman Jr. is a male seen for evaluation of resistant HTN and management.   Wang Forman Jr. is referred by:cardiologist Scott Cameron MD    HTN:  Tolerating meds, no changes since last visit   Home BP lo-130s/70-80s,  130s/80s on avg   Adherence to current HTN meds: compliant all of the time     Afib:   On dilt and VKA  - no issues, no sx     Antiplatelet/anticoagulation:  - VKA only, no plavix , having INR monitoring through PCP office     Hyperlipidemia:    Tolerating praluent no issues - using Q1mo instead of Q2wks.  Had labs done   Clinical evidence of ASCVD:     1) hx of MI/ACS:  Yes, Details: s/p CABG  (Dr. Love)   2) coronary or other revascularization procedure: PCI with stenting      T2D:  On acarbose and metformin, no concerns.     Last A1c   Lab Results   Component Value Date    HBA1C 6.7 (H) 2021     CHOCO:   Still daily somnolence and reduced energy. feels poorly rested daily, " currently on Bipap, seening pulm MD.      Current Outpatient Medications:   •  meclizine, TAKE 1 TABLET BY MOUTH THREE TIMES DAILY AS NEEDED FOR DIZZINESS FOR 10 DAYS, Taking  •  amLODIPine, 5 mg, Oral, QHS, Taking  •  warfarin, Take 5 mg three times weekly, and 7.5 mg four times weekly, Taking  •  azithromycin, 2 tabs stat then 1 tab daily (Z-nichole), PRN  •  diltiazem, TAKE 1 CAPSULE BY MOUTH ONCE DAILY *STOP  THREE  TIMES  DAILY  FORMULATION*, Taking  •  warfarin, 1 tab(s), Taking  •  oxybutynin, 5 mL, Taking  •  pantoprazole, Take 1 tablet by mouth once daily, Taking  •  Praluent, INJECT 1 SYRINGE SUBCUTANEOUSLY AS DIRECTED EVERY 14 DAYS, Taking  •  amoxicillin, , Taking  •  chlorhexidine, , PRN  •  oxybutynin SR, 5 mg, Oral, DAILY, Taking  •  Eplerenone, Take 1 tablet by mouth twice daily, Taking  •  trandolapril, TAKE 1 TABLET BY MOUTH ONCE DAILY IN THE EVENING, Taking  •  doxazosin, 2 mg, Oral, DAILY, Taking  •  acetaminophen, 500-1,000 mg, Oral, Q6HRS PRN, PRN  •  metFORMIN, Take 1 tablet by mouth once daily, Taking  •  Acetaminophen-Codeine, , Taking  •  doxycycline, Take 50 mg by mouth., Taking  •  HYDROcodone-acetaminophen, TAKE 1 TABLET BY MOUTH EVERY 6 HOURS AS NEEDED FOR MODERATE TO SEVERE POST PROCEDURE PAIN FOR 5 DAYS, Taking  •  traMADol, TAKE 1 TABLET BY MOUTH TWICE DAILY AS NEEDED FOR MODERATE TO SEVERE PAIN, Taking  •  acarbose, Take 1 tablet by mouth twice daily, Taking  •  nitroglycerin, , Taking  •  vitamin D3, 400 Units, Oral, DAILY, Taking  •  therapeutic multivitamin-minerals, 1 Tablet, Oral, DAILY, Taking  •  testosterone cypionate, 200 mg, Intramuscular, Q21 DAYS, Taking       Social History     Tobacco Use   • Smoking status: Former Smoker     Packs/day: 2.00     Years: 25.00     Pack years: 50.00     Types: Cigarettes     Quit date: 1987     Years since quittin.5   • Smokeless tobacco: Never Used   Vaping Use   • Vaping Use: Never used   Substance Use Topics   • Alcohol use: Not  "Currently     Alcohol/week: 0.0 oz     Comment: a few times a year   • Drug use: No      Review of Systems   Constitutional: Negative for chills and fever.   Respiratory: Negative for cough.    Cardiovascular: Negative for chest pain, palpitations, orthopnea, claudication and leg swelling.   Gastrointestinal: Negative for nausea.   Musculoskeletal: Negative for myalgias.   Neurological: Negative for weakness.   Endo/Heme/Allergies: Does not bruise/bleed easily.        Objective     Vitals:    07/28/22 1020 07/28/22 1022   BP: (!) 142/73 130/73   BP Location: Left arm Left arm   Patient Position: Sitting Sitting   BP Cuff Size: Large adult Large adult   Pulse: 75 69   Weight: 98.5 kg (217 lb 1 oz)    Height: 1.651 m (5' 5\")      BP Readings from Last 5 Encounters:   07/28/22 130/73   05/27/22 (!) 144/80   03/11/22 128/70   02/14/22 134/70   12/13/21 142/64      Body mass index is 36.12 kg/m².    Physical Exam  Constitutional:       General: He is not in acute distress.     Appearance: He is well-developed. He is not diaphoretic.   Neck:      Vascular: No JVD.   Cardiovascular:      Rate and Rhythm: Normal rate and regular rhythm.      Heart sounds: Normal heart sounds. No murmur heard.    No friction rub. No gallop.   Pulmonary:      Effort: Pulmonary effort is normal. No respiratory distress.      Breath sounds: Normal breath sounds. No wheezing or rales.   Musculoskeletal:         General: No tenderness.   Skin:     General: Skin is warm and dry.      Coloration: Skin is not pale.      Findings: No erythema or rash.   Neurological:      Mental Status: He is alert and oriented to person, place, and time.      Coordination: Coordination normal.   Psychiatric:         Behavior: Behavior normal.          Accessory Clinical Findings:   Echocardiogram:  Results for orders placed or performed during the hospital encounter of 09/26/17   ECHOCARDIOGRAM COMP W/O CONT   Result Value Ref Range    Eject.Frac. MOD BP 57.79     " Eject.Frac. MOD 4C 56.25     Eject.Frac. MOD 2C 62.39       Lab Results   Component Value Date    CHOLSTRLTOT 113 (L) 07/15/2022    LDL 19 07/15/2022    HDL 38.0 (L) 07/15/2022    TRIGLYCERIDE 278 (H) 07/15/2022           apoB 66   Direct LDL 52   Lab Results   Component Value Date    HBA1C 6.7 (H) 06/03/2021      Lab Results   Component Value Date    SODIUM 139 07/15/2022    POTASSIUM 4.3 07/15/2022    CHLORIDE 103 07/15/2022    CO2 23 07/15/2022    GLUCOSE 132 (H) 07/15/2022    BUN 11 07/15/2022    CREATININE 1.4 (H) 07/15/2022          Lab Results   Component Value Date    URCREAT 148 08/12/2020    MICROALBUR 7.1 08/12/2020    MALBCRT 5 08/12/2020        Ref. Range 7/1/2019 13:20 7/23/2019 07:25 8/26/2019 11:05   CPK Total Latest Ref Range: 39 - 308 U/L 453 (H) 170 240     VASCULAR IMAGING:     Echo 2017  No prior study is available for comparison.   Left ventricular ejection fraction is visually estimated to be 60%.   Normal diastolic function. Poor endocardial definition difficult to   assess wall motion.  No evidence of valvular abnormality based on Doppler evaluation.   Estimated right ventricular systolic pressure  is 25 mmHg.    Echo 7/25/19  Normal left ventricular systolic function. Left ventricular ejection   fraction is visually estimated to be 55%.   Normal diastolic function.    Renal art duplex 7/31/19  Unremarkable renal sonogram with no evidence of renal artery stenosis as demonstrated above             Javad Childs M.D.   [facility]   [facility]   [phone]

## 2022-08-01 ENCOUNTER — DOCUMENTATION (OUTPATIENT)
Dept: VASCULAR LAB | Facility: MEDICAL CENTER | Age: 77
End: 2022-08-01
Payer: COMMERCIAL

## 2022-08-01 NOTE — PROGRESS NOTES
I have contacted [facility] ([phone]) and completed a verbal PA.     Drug: Praluent   [id number] Approved   Dates: 08/01/2022-12/31/2022

## 2022-10-07 DIAGNOSIS — I1A.0 RESISTANT HYPERTENSION: ICD-10-CM

## 2022-10-10 RX ORDER — TRANDOLAPRIL TABLETS 4 MG/1
TABLET ORAL
Qty: 90 TABLET | Refills: 3 | Status: SHIPPED | OUTPATIENT
Start: 2022-10-10 | End: 2023-09-14

## 2022-10-10 RX ORDER — EPLERENONE 50 MG/1
TABLET, FILM COATED ORAL
Qty: 180 TABLET | Refills: 3 | Status: SHIPPED | OUTPATIENT
Start: 2022-10-10 | End: 2023-07-18

## 2022-10-31 DIAGNOSIS — I48.0 PAROXYSMAL ATRIAL FIBRILLATION (HCC): ICD-10-CM

## 2022-10-31 RX ORDER — DILTIAZEM HYDROCHLORIDE 180 MG/1
CAPSULE, EXTENDED RELEASE ORAL
Qty: 90 CAPSULE | Refills: 3 | Status: SHIPPED | OUTPATIENT
Start: 2022-10-31 | End: 2022-12-07 | Stop reason: SDUPTHER

## 2022-11-01 NOTE — PROGRESS NOTES
No Patient discharged with all belongings and prescriptions. RN reviewed discharge summary with pt and discussed medications and hospital stay, all questions answered. Patient's PIV removed. Patient left with his wife, via personal vehical.

## 2022-12-06 ENCOUNTER — OFFICE VISIT (OUTPATIENT)
Dept: URGENT CARE | Facility: CLINIC | Age: 77
End: 2022-12-06
Payer: COMMERCIAL

## 2022-12-06 VITALS
HEART RATE: 60 BPM | BODY MASS INDEX: 33.75 KG/M2 | TEMPERATURE: 98.3 F | RESPIRATION RATE: 18 BRPM | DIASTOLIC BLOOD PRESSURE: 66 MMHG | HEIGHT: 66 IN | OXYGEN SATURATION: 95 % | SYSTOLIC BLOOD PRESSURE: 120 MMHG | WEIGHT: 210 LBS

## 2022-12-06 DIAGNOSIS — J06.9 UPPER RESPIRATORY INFECTION, ACUTE: Primary | ICD-10-CM

## 2022-12-06 DIAGNOSIS — I48.0 PAROXYSMAL ATRIAL FIBRILLATION (HCC): ICD-10-CM

## 2022-12-06 DIAGNOSIS — R05.1 ACUTE COUGH: ICD-10-CM

## 2022-12-06 DIAGNOSIS — J45.20 MILD INTERMITTENT REACTIVE AIRWAY DISEASE WITHOUT COMPLICATION: ICD-10-CM

## 2022-12-06 LAB
EXTERNAL QUALITY CONTROL: NORMAL
FLUAV+FLUBV AG SPEC QL IA: NORMAL
INT CON NEG: NORMAL
INT CON NEG: NORMAL
INT CON POS: NORMAL
INT CON POS: NORMAL
SARS-COV+SARS-COV-2 AG RESP QL IA.RAPID: NEGATIVE

## 2022-12-06 PROCEDURE — 99203 OFFICE O/P NEW LOW 30 MIN: CPT | Mod: CS | Performed by: NURSE PRACTITIONER

## 2022-12-06 PROCEDURE — 87804 INFLUENZA ASSAY W/OPTIC: CPT | Performed by: NURSE PRACTITIONER

## 2022-12-06 PROCEDURE — 87426 SARSCOV CORONAVIRUS AG IA: CPT | Performed by: NURSE PRACTITIONER

## 2022-12-06 RX ORDER — DILTIAZEM HYDROCHLORIDE 180 MG/1
CAPSULE, EXTENDED RELEASE ORAL
Qty: 90 CAPSULE | Refills: 3 | OUTPATIENT
Start: 2022-12-06

## 2022-12-06 RX ORDER — DEXTROMETHORPHAN HYDROBROMIDE AND PROMETHAZINE HYDROCHLORIDE 15; 6.25 MG/5ML; MG/5ML
5 SYRUP ORAL EVERY 4 HOURS PRN
Qty: 140 ML | Refills: 0 | Status: SHIPPED | OUTPATIENT
Start: 2022-12-06

## 2022-12-06 ASSESSMENT — FIBROSIS 4 INDEX: FIB4 SCORE: 1.91

## 2022-12-06 NOTE — PROGRESS NOTES
"Wang Forman Jr. is a 77 y.o. male who presents for Cough (Cough, runny nose, chest congestion x4 days)      HPI  This is a new problem. Wang Forman Jr. is a 77 y.o. patient who presents to urgent care with c/o:  cough, wheezing and congestion x 4 days. His wife is sick with similar symptoms.  Runny nose, body aches, fatigue. Not sleeping well secondary to coughing all night long.   Treatments tried: nothing   Denies fever , ear pain, sinus pain, sweats/ chills, NVD  No other aggravating or alleviating factors.       ROS See HPI    Allergies:       Allergies   Allergen Reactions    Naproxen Anaphylaxis    Nsaids Anaphylaxis    Sulfa Drugs Nausea and Unspecified     Other reaction(s): nausea  Other reaction(s): Unknown    Atenolol Unspecified    Codeine      Nausea     Hydrochlorothiazide Unspecified    Morphine Nausea    Vascepa [Epa Ethyl Sherice]      Atrial fib      Hmg-Coa-R Inhibitors Myalgia     Failed crestor, prava, simva, atorva - myalgias     Rosuvastatin Calcium Myalgia       PMSFS Hx:  Past Medical History:   Diagnosis Date    Adult acne     Allergic rhinitis     Anesthesia     \"hard time waking up\" and itching     Arthritis of knee     Atrial fibrillation (HCC) 2013    post-op CABG; no episodes since    Breath shortness     with exertion    Cataract     bilat IOL    Colon polyps     Coronary artery disease     cabg/Dipaulo    Degenerative disk disease     lumbar and cervical    DISH (diffuse idiopathic skeletal hyperostosis)     back    Fibromyalgia     H/O bone density study 03/30/12    [place],     Heart burn     History of kidney stones     Hyperlipidemia     with elevated triglycerides and low HDL    Hypertension     Hypogonadism, male     Myalgia     Myocardial infarct (HCC)     5 total    Osteoarthritis     Restless leg syndrome     Sleep apnea     on bipap Jose    Type 2 diabetes mellitus (HCC)     oral meds (pt becomes symptomatic when FSBS is in the 70s)     Vertigo  "     Past Surgical History:   Procedure Laterality Date    LUMBAR LAMINECTOMY DISKECTOMY  2018    Procedure: LUMBAR LAMINECTOMY DISKECTOMY- FOR REDO L3-5 LAMINOTOMIES W/SYNOVIAL CYST RESECTION;  Surgeon: Lenny Lew M.D.;  Location: SURGERY [facility];  Service: Neurosurgery    LUMBAR LAMINECTOMY DISKECTOMY  10/24/2017    Procedure: LUMBAR LAMINECTOMY DISKECTOMY L2-5;  Surgeon: Lenny Lew M.D.;  Location: SURGERY [facility];  Service: Neurosurgery    OTHER NEUROLOGICAL SURG  2017    hardware in neck    MULTIPLE CORONARY ARTERY BYPASS  2013    [facility]    CARPAL TUNNEL ENDOSCOPIC      RT    KNEE ARTHROSCOPY      RT    ANGIOPLASTY  2009    ADDITIONAL DRUG-ELUTING STENT IN CIRCUMFLEX    ANGIOPLASTY      MULTIVESSEL ANGIOPLASTY AND DRUG-ELUTING STENTS    CATARACT EXTRACTION WITH IOL      LUMBAR LAMINECTOMY DISKECTOMY      UVULOPHARYNGOPALATOPLASTY       Family History   Problem Relation Age of Onset    Heart Attack Father 62        SILENT    Heart Disease Father     Cancer Sister         colon    Hypertension Brother      Social History     Tobacco Use    Smoking status: Former     Packs/day: 2.00     Years: 25.00     Pack years: 50.00     Types: Cigarettes     Quit date: 1987     Years since quittin.9    Smokeless tobacco: Never   Substance Use Topics    Alcohol use: Not Currently     Alcohol/week: 0.0 oz     Comment: a few times a year       Problems:   Patient Active Problem List   Diagnosis    Type 2 diabetes mellitus without complication, without long-term current use of insulin (HCC)    Essential hypertension    ASTHMA    Sleep apnea    Environmental allergies    Dyslipidemia    Restless leg syndrome    Atherosclerosis of native coronary artery of native heart without angina pectoris    Osteoarthritis    DISH (diffuse idiopathic skeletal hyperostosis)    Hypogonadism male    Lumbar disc disease    Cervical disc disease    Insomnia    Paroxysmal atrial  fibrillation (HCC)    Diet-controlled type 2 diabetes mellitus (CMS-HCC)    Colon polyps    DDD (degenerative disc disease), lumbar    Degeneration of lumbar intervertebral disc    Low back pain    Erectile dysfunction, unspecified erectile dysfunction type    Encounter for long-term (current) use of insulin ([facility])    Hypertriglyceridemia    Hx of CABG    LVH (left ventricular hypertrophy)    Hyponatremia    CHOCO treated with BiPAP    Urge incontinence    Balance disorder    Chronic anticoagulation    OAB (overactive bladder)    Class 1 obesity       Medications:   Current Outpatient Medications on File Prior to Visit   Medication Sig Dispense Refill    azithromycin (ZITHROMAX) 250 MG Tab 2 tablets today followed by 1 tablet daily till completed 6 Tablet 0    PRALUENT 150 MG/ML Solution Auto-injector INJECT 1 SYRINGE  SUBCUTANEOUSLY EVERY 2  WEEKS 6 mL 3    amLODIPine (NORVASC) 5 MG Tab Take 1 Tablet by mouth at bedtime. 90 Tablet 3    diltiazem (TIAZAC) 180 MG SR capsule 1 tab po q d 90 Capsule 3    acarbose (PRECOSE) 50 MG Tab Take 1 Tablet by mouth 3 times a day with meals. 90 Tablet 1    trandolapril (MAVIK) 4 MG Tab TAKE 1 TABLET BY MOUTH ONCE DAILY IN THE EVENING 90 Tablet 3    Eplerenone 50 MG Tab TAKE 1 TABLET BY MOUTH  TWICE DAILY 180 Tablet 3    metFORMIN (GLUCOPHAGE) 500 MG Tab TAKE 1 TABLET BY MOUTH ONCE DAILY 90 Tablet 0    meclizine (ANTIVERT) 25 MG Tab TAKE 1 TABLET BY MOUTH THREE TIMES DAILY AS NEEDED FOR DIZZINESS FOR 10 DAYS      warfarin (COUMADIN) 5 MG Tab Take 5 mg three times weekly, and 7.5 mg four times weekly 120 Tablet 3    oxybutynin (DITROPAN) 5 MG/5ML Syrup 5 mL      pantoprazole (PROTONIX) 40 MG Tablet Delayed Response Take 1 tablet by mouth once daily 90 Tablet 3    amoxicillin (AMOXIL) 500 MG Cap       chlorhexidine (PERIDEX) 0.12 % Solution       oxybutynin SR (DITROPAN-XL) 5 MG TABLET SR 24 HR Take 5 mg by mouth every day.      doxazosin (CARDURA) 2 MG Tab Take 1 Tablet by mouth every  "day for 360 days. For blood pressure and prostate 90 Tablet 3    acetaminophen (TYLENOL) 500 MG Tab Take 500-1,000 mg by mouth every 6 hours as needed.      Acetaminophen-Codeine 300-30 MG Tab       doxycycline (MONODOX) 50 MG capsule Take 50 mg by mouth.      HYDROcodone-acetaminophen (NORCO) 5-325 MG Tab per tablet TAKE 1 TABLET BY MOUTH EVERY 6 HOURS AS NEEDED FOR MODERATE TO SEVERE POST PROCEDURE PAIN FOR 5 DAYS      traMADol (ULTRAM) 50 MG Tab TAKE 1 TABLET BY MOUTH TWICE DAILY AS NEEDED FOR MODERATE TO SEVERE PAIN      acarbose (PRECOSE) 50 MG Tab Take 1 tablet by mouth twice daily 180 tablet 3    nitroglycerin (NITROSTAT) 0.4 MG SL Tab       cholecalciferol (VITAMIN D3) 400 UNIT Tab Take 400 Units by mouth every day.      therapeutic multivitamin-minerals (THERAGRAN-M) Tab Take 1 Tab by mouth every day.      testosterone cypionate (DEPO-TESTOSTERONE) 200 MG/ML Solution injection 1 mL by Intramuscular route every 21 days. 1 mL 11     Current Facility-Administered Medications on File Prior to Visit   Medication Dose Route Frequency Provider Last Rate Last Admin    testosterone cypionate (DEPO-TESTOSTERONE) injection 200 mg  200 mg Intramuscular Q21 DAYS Natanael Sin M.D.   200 mg at 12/01/22 1010          Objective:     /66 (BP Location: Left arm, Patient Position: Sitting, BP Cuff Size: Adult)   Pulse 60   Temp 36.8 °C (98.3 °F)   Resp 18   Ht 1.676 m (5' 6\")   Wt 95.3 kg (210 lb)   SpO2 95%   BMI 33.89 kg/m²     Physical Exam  Nursing note reviewed.   Constitutional:       General: He is not in acute distress.     Appearance: Normal appearance. He is well-developed and well-groomed. He is not toxic-appearing.   HENT:      Head: Normocephalic and atraumatic.      Right Ear: Tympanic membrane, ear canal and external ear normal.      Left Ear: Tympanic membrane, ear canal and external ear normal.      Nose: Congestion and rhinorrhea present.      Mouth/Throat:      Mouth: Mucous membranes are " moist.      Pharynx: Posterior oropharyngeal erythema present. No oropharyngeal exudate.   Eyes:      General:         Right eye: No discharge.         Left eye: No discharge.      Conjunctiva/sclera: Conjunctivae normal.      Pupils: Pupils are equal, round, and reactive to light.   Cardiovascular:      Rate and Rhythm: Normal rate and regular rhythm.      Pulses: Normal pulses.      Heart sounds: Normal heart sounds.   Pulmonary:      Effort: Pulmonary effort is normal. No accessory muscle usage.      Breath sounds: Normal breath sounds and air entry.   Musculoskeletal:      Cervical back: Neck supple.   Lymphadenopathy:      Cervical: No cervical adenopathy.      Upper Body:      Right upper body: No supraclavicular adenopathy.      Left upper body: No supraclavicular adenopathy.   Skin:     General: Skin is warm and dry.      Capillary Refill: Capillary refill takes less than 2 seconds.   Neurological:      Mental Status: He is alert and oriented to person, place, and time.   Psychiatric:         Mood and Affect: Mood normal.         Behavior: Behavior normal.         Thought Content: Thought content normal.         Judgment: Judgment normal.         Assessment /Associated Orders:      1. Upper respiratory infection, acute        2. Mild intermittent reactive airway disease without complication        3. Acute cough  POCT SARS-COV Antigen CÉSAR (Symptomatic only)    POCT Influenza A/B    promethazine-dextromethorphan (PROMETHAZINE-DM) 6.25-15 MG/5ML syrup            Medical Decision Making:    Pt is clinically stable at today's acute urgent care visit.  No acute distress noted. Appropriate for outpatient care at this time.   Acute problem today .   Resume all prior  RX medications. Take as prescribed.   Educated in proper administration of  prescription medication(s) ordered today including safety, possible SE, risks, benefits, rationale and alternatives to therapy.   Keep well hydrated    Discussed that this  illness appears to be viral in nature. I did not see any evidence of a bacterial process.   OTC medications can be used for symptomatic relief of symptoms. Follow manufacturers guidelines for dosing and instructions.     Discussed Dx, management options (risks,benefits, and alternatives to planned treatment), natural progression and supportive care.  Expressed understanding and the treatment plan was agreed upon.   Questions were encouraged and answered   Return to urgent care prn if new or worsening sx or if there is no improvement in condition prn.    Educated in Red flags and indications to immediately call 911 or present to the Emergency Department.       Time I spent evaluating Wang Shermanriri Summers in urgent care today was 31  minutes. This time includes preparing for visit, reviewing any pertinent notes or test results, counseling/education, exam, obtaining HPI, interpretation of lab tests, medication management and documentation as indicated above.Time does not include separately billable procedures noted .       Please note that this dictation was created using voice recognition software. I have worked with consultants from the vendor as well as technical experts from [facility] to optimize the interface. I have made every reasonable attempt to correct obvious errors, but I expect that there are errors of grammar and possibly content that I did not discover before finalizing the note.  This note was electronically signed by provider

## 2023-02-09 ENCOUNTER — OFFICE VISIT (OUTPATIENT)
Dept: VASCULAR LAB | Facility: MEDICAL CENTER | Age: 78
End: 2023-02-09
Attending: FAMILY MEDICINE
Payer: COMMERCIAL

## 2023-02-09 VITALS
SYSTOLIC BLOOD PRESSURE: 135 MMHG | WEIGHT: 214 LBS | RESPIRATION RATE: 16 BRPM | HEART RATE: 73 BPM | DIASTOLIC BLOOD PRESSURE: 70 MMHG | HEIGHT: 66 IN | BODY MASS INDEX: 34.39 KG/M2

## 2023-02-09 DIAGNOSIS — E11.9 TYPE 2 DIABETES MELLITUS WITHOUT COMPLICATION, WITHOUT LONG-TERM CURRENT USE OF INSULIN (HCC): ICD-10-CM

## 2023-02-09 DIAGNOSIS — I48.0 PAROXYSMAL ATRIAL FIBRILLATION (HCC): ICD-10-CM

## 2023-02-09 DIAGNOSIS — D68.69 SECONDARY HYPERCOAGULABLE STATE (HCC): Chronic | ICD-10-CM

## 2023-02-09 DIAGNOSIS — Z95.1 HX OF CABG: ICD-10-CM

## 2023-02-09 DIAGNOSIS — R42 DIZZINESS: ICD-10-CM

## 2023-02-09 DIAGNOSIS — Z79.01 CHRONIC ANTICOAGULATION: Chronic | ICD-10-CM

## 2023-02-09 DIAGNOSIS — E78.2 MIXED HYPERLIPIDEMIA: Chronic | ICD-10-CM

## 2023-02-09 DIAGNOSIS — G47.33 OSA TREATED WITH BIPAP: Chronic | ICD-10-CM

## 2023-02-09 DIAGNOSIS — E88.810 METABOLIC SYNDROME: Chronic | ICD-10-CM

## 2023-02-09 DIAGNOSIS — I25.10 CORONARY ARTERY DISEASE INVOLVING NATIVE HEART WITHOUT ANGINA PECTORIS, UNSPECIFIED VESSEL OR LESION TYPE: ICD-10-CM

## 2023-02-09 DIAGNOSIS — I1A.0 RESISTANT HYPERTENSION: Chronic | ICD-10-CM

## 2023-02-09 PROBLEM — I48.92 ATRIAL FLUTTER, UNSPECIFIED TYPE (HCC): Status: ACTIVE | Noted: 2023-01-27

## 2023-02-09 PROCEDURE — 99214 OFFICE O/P EST MOD 30 MIN: CPT | Performed by: FAMILY MEDICINE

## 2023-02-09 PROCEDURE — 99212 OFFICE O/P EST SF 10 MIN: CPT

## 2023-02-09 RX ORDER — DILTIAZEM HYDROCHLORIDE 180 MG/1
CAPSULE, COATED, EXTENDED RELEASE ORAL
COMMUNITY
Start: 2023-02-08 | End: 2023-02-09

## 2023-02-09 ASSESSMENT — ENCOUNTER SYMPTOMS
FEVER: 0
COUGH: 0
CHILLS: 0
ORTHOPNEA: 0
BRUISES/BLEEDS EASILY: 0
MYALGIAS: 0
CLAUDICATION: 0
NAUSEA: 0
WEAKNESS: 0
PALPITATIONS: 0

## 2023-02-09 ASSESSMENT — FIBROSIS 4 INDEX: FIB4 SCORE: 1.91

## 2023-02-09 NOTE — PROGRESS NOTES
RESISTANT HTN CLINIC - Follow-up   23      Subjective     Wang Forman Jr. is a male seen for evaluation of resistant HTN and management.   Wang Forman Jr. is referred by:cardiologist Scott Cameron MD    Interval hx/concerns: pending [place] ablation with EP specialist at [place].   O/w feeling well, no new sx and tolerating meds.  No recent labs.       HTN:  Tolerating meds, no changes since last visit   Home BP los-low 140s/70s   Adherence to current HTN meds: compliant all of the time     Afib:   On dilt and VKA  Pending ablation   No prior CVA/TIA     Antiplatelet/anticoagulation:  - VKA only, no plavix , having INR monitoring through PCP office     Hyperlipidemia:    Tolerating praluent no issues - using Q1mo instead of Q2wks.  Had labs done   Clinical evidence of ASCVD:     1) hx of MI/ACS:  Yes, Details: s/p CABG  (Dr. Love)   2) coronary or other revascularization procedure: PCI with stenting      T2D:  No changes, tolerating meds.     Last A1c   Lab Results   Component Value Date    HBA1C 6.7 (H) 2022       Current Outpatient Medications:     doxazosin, TAKE 1 TABLET BY MOUTH ONCE DAILY FOR BLOOD PRESSURE  AND FOR PROSTATE, Taking    metFORMIN, TAKE 1 TABLET BY MOUTH ONCE DAILY, Taking    diltiazem, 1 tab po q d, Taking    promethazine-dextromethorphan, 5 mL, Oral, Q4HRS PRN, Taking    Praluent, INJECT 1 SYRINGE  SUBCUTANEOUSLY EVERY 2  WEEKS, Taking    amLODIPine, 5 mg, Oral, QHS, Taking    trandolapril, TAKE 1 TABLET BY MOUTH ONCE DAILY IN THE EVENING, Taking    Eplerenone, TAKE 1 TABLET BY MOUTH  TWICE DAILY, Taking    meclizine, TAKE 1 TABLET BY MOUTH THREE TIMES DAILY AS NEEDED FOR DIZZINESS FOR 10 DAYS, Taking    warfarin, Take 5 mg three times weekly, and 7.5 mg four times weekly (Patient taking differently: Take 5 mg five times weekly, and 7.5 mg two times weekly), Taking    pantoprazole, Take 1 tablet by mouth once daily, Taking     "amoxicillin, , PRN    chlorhexidine, , Taking    oxybutynin SR, 5 mg, Oral, DAILY, Taking    acetaminophen, 500-1,000 mg, Oral, Q6HRS PRN, PRN    Acetaminophen-Codeine, , Taking    doxycycline, Take 50 mg by mouth., Taking    HYDROcodone-acetaminophen, TAKE 1 TABLET BY MOUTH EVERY 6 HOURS AS NEEDED FOR MODERATE TO SEVERE POST PROCEDURE PAIN FOR 5 DAYS, Taking    traMADol, TAKE 1 TABLET BY MOUTH TWICE DAILY AS NEEDED FOR MODERATE TO SEVERE PAIN, PRN    acarbose, Take 1 tablet by mouth twice daily, Taking    nitroglycerin, , PRN    vitamin D3, 400 Units, Oral, DAILY, Taking    therapeutic multivitamin-minerals, 1 Tablet, Oral, DAILY, Taking    testosterone cypionate, 200 mg, Intramuscular, Q21 DAYS, Taking    Current Facility-Administered Medications:     testosterone cypionate       Social History     Tobacco Use    Smoking status: Former     Packs/day: 2.00     Years: 25.00     Pack years: 50.00     Types: Cigarettes     Quit date: 1987     Years since quittin.1    Smokeless tobacco: Never   Vaping Use    Vaping Use: Never used   Substance Use Topics    Alcohol use: Not Currently     Alcohol/week: 0.0 oz     Comment: a few times a year    Drug use: No      Review of Systems   Constitutional:  Negative for chills and fever.   Respiratory:  Negative for cough.    Cardiovascular:  Negative for chest pain, palpitations, orthopnea, claudication and leg swelling.   Gastrointestinal:  Negative for nausea.   Musculoskeletal:  Negative for myalgias.   Neurological:  Negative for weakness.   Endo/Heme/Allergies:  Does not bruise/bleed easily.      Objective     Vitals:    23 1009 23 1018   BP: 134/68 135/70   BP Location: Left arm Left arm   Patient Position: Sitting Sitting   BP Cuff Size: Adult Adult   Pulse: 72 73   Resp: 16    Weight: 97.1 kg (214 lb)    Height: 1.676 m (5' 6\")        BP Readings from Last 5 Encounters:   23 135/70   22 120/66   22 130/73   22 (!) 144/80 "   03/11/22 128/70      Body mass index is 34.54 kg/m².    Physical Exam  Constitutional:       General: He is not in acute distress.     Appearance: He is well-developed. He is not diaphoretic.   Neck:      Vascular: No JVD.   Cardiovascular:      Rate and Rhythm: Normal rate and regular rhythm.      Heart sounds: Normal heart sounds. No murmur heard.    No friction rub. No gallop.   Pulmonary:      Effort: Pulmonary effort is normal. No respiratory distress.      Breath sounds: Normal breath sounds. No wheezing or rales.   Musculoskeletal:         General: No tenderness.   Skin:     General: Skin is warm and dry.      Coloration: Skin is not pale.      Findings: No erythema or rash.   Neurological:      Mental Status: He is alert and oriented to person, place, and time.      Coordination: Coordination normal.   Psychiatric:         Behavior: Behavior normal.        Accessory Clinical Findings:   Echocardiogram:  Results for orders placed or performed during the hospital encounter of 09/26/17   ECHOCARDIOGRAM COMP W/O CONT   Result Value Ref Range    Eject.Frac. MOD BP 57.79     Eject.Frac. MOD 4C 56.25     Eject.Frac. MOD 2C 62.39       Lab Results   Component Value Date    CHOLSTRLTOT 111 (L) 11/18/2022    LDL 19 11/18/2022    HDL 34.0 (L) 11/18/2022    TRIGLYCERIDE 291 (H) 11/18/2022           No results found for: LIPOPROTA   No results found for: APOB      Lab Results   Component Value Date    HBA1C 6.7 (H) 11/18/2022      Lab Results   Component Value Date    SODIUM 138 11/18/2022    POTASSIUM 4.3 11/18/2022    CHLORIDE 103 11/18/2022    CO2 22 11/18/2022    GLUCOSE 151 (H) 11/18/2022    BUN 12 11/18/2022    CREATININE 1.2 11/18/2022    GLOMRATE 53 (L) 09/06/2022          Lab Results   Component Value Date    URCREAT 107 11/18/2022    MICROALBUR 20.0 11/18/2022    MALBCRT 19 11/18/2022        Ref. Range 7/1/2019 13:20 7/23/2019 07:25 8/26/2019 11:05   CPK Total Latest Ref Range: 39 - 308 U/L 453 (H) 170 240      VASCULAR IMAGING:     Echo 2017  No prior study is available for comparison.   Left ventricular ejection fraction is visually estimated to be 60%.   Normal diastolic function. Poor endocardial definition difficult to   assess wall motion.  No evidence of valvular abnormality based on Doppler evaluation.   Estimated right ventricular systolic pressure  is 25 mmHg.    Echo 7/25/19  Normal left ventricular systolic function. Left ventricular ejection   fraction is visually estimated to be 55%.   Normal diastolic function.    Renal art duplex 7/31/19  Unremarkable renal sonogram with no evidence of renal artery stenosis as demonstrated above               Assessment / Plan:  1. Resistant hypertension  Comp Metabolic Panel    Lipid Profile    MICROALBUMIN CREAT RATIO URINE    CBC WITHOUT DIFFERENTIAL    APOLIPOPROTEIN B      2. Coronary artery disease involving native heart without angina pectoris, unspecified vessel or lesion type        3. Hx of CABG        4. Paroxysmal atrial fibrillation (HCC)        5. Type 2 diabetes mellitus without complication, without long-term current use of insulin (HCC)  Comp Metabolic Panel    Lipid Profile    MICROALBUMIN CREAT RATIO URINE    CBC WITHOUT DIFFERENTIAL    APOLIPOPROTEIN B    HEMOGLOBIN A1C (Glycohemoglobin GHB Total/A1C with MBG Estimate)      6. Chronic anticoagulation        7. Metabolic syndrome        8. Mixed hyperlipidemia  Comp Metabolic Panel    Lipid Profile    MICROALBUMIN CREAT RATIO URINE    CBC WITHOUT DIFFERENTIAL    APOLIPOPROTEIN B      9. CHOCO treated with BiPAP        10. Secondary hypercoagulable state (HCC)        11. Dizziness             1. Blood Pressure Control:  Qualifies for resistant HTN due to 4 meds w/o control   ACC/AHA (2017) goal <130/80, consider less stringent goals based upon pt preference and polypharmacy potential, though <140/80 at the most    Home BP at goal:  yes   Office BP at goal: yes   24h ABPM (12/2019): Mean daytime: 146/75  consistent with poorly controlled out of office  systolic blood pressure. Nocturnal dip: Appears normal   Device candidate? No    -Wider pulse pressure indicative of arteriosclerosis - improved   Plan:   Monitoring:   - continue home BP monitoring, reviewed correct technique:  Yes   - order 24h ABPM:  no  - monitor lytes/gfr routinely     2. Work up of Secondary Causes of Resistant Hypertension:   Renovascular HTN: Excluded  Primary Aldosteronism: Excluded, high renin  Thyroid Function: Excluded     CHOCO on CPAP  Strongly encouraged CPAP adherence to reduce RV strain, improve overall fatigue symptoms, and improve BP in both the short- and long-term as per HIPARCO (2013) and HIPARCO-2 (2021) studies.   - continue CPAP, defer mgmt to sleep MD    Pheochromocytoma: Not Yet Assessed  Other: none  Recommendations At This Visit:        3. Medication Use / Adherence:  Assessment: Complete  Recommendations: Instructed to Continue Taking All Medications As Prescribed         4. End Organ Damage:   Left Ventricular Hypertrophy: Absent on  Echocardiogram Date: 2017  Albuminuria: Microalbuminuria, level A2 on Date: 10/2018  Renal Function: Chronic Kidney Disease  Stage 3a   - avoid nephrotoxins  - continue HTN control with RAAS blockade   - monitor lytes/gfr routinely  - eval with nephrology if worsening over time    Established Cardiovascular Disease:    # CAD - s/p CABG, 12/2013, stable  still in high risk stage <10yrs from operation   - continue aggressive med mgmt, qualifies for pcsk9i  - ongoing care with cardiology    # Afib - hx of paroxysmal Afib post-op CABG, recurrent   Appears to be contemporaneous to the vascepa initiation  No current, has been off BB and xarelto in 2013.   - continue diltiazem and OAC for CVA proph    - pending ablation   - Continue monitoring per cardiology    5. Lifestyle Recommendations:    SMOKING:    reports that he quit smoking about 36 years ago. His smoking use included cigarettes. He has a  50.00 pack-year smoking history. He has never used smokeless tobacco.   - continued complete avoidance of all tobacco products     PHYSICAL ACTIVITY: continue healthy activity to improve CV fitness.  In general, targeting >150min/week of moderate-level activity.      WEIGHT MANAGEMENT AND NUTRITION: Mediterranean style dietary approach       6. Standard HTN Pharmacotherapy:  - continue trandolapril 4mg QHS for true 24h coverage  Thiazide Type Diuretic: stop due to worsening ED  - continue amlodipine 5mg daily - aware on nonDHP-CCB too     7. Additional Agents:  - continue eplerenone 50mg BID, consider reduction to 25mg BID in future   - continue doxazosin to 2mg QHS 1 full tab, can then consider increase to 4mg QHS  - continue diltiazem 180mg ER daily to reduce fatigue while providing rate-control for Afib   Direct Vasodilator: none  Centrally Acting Alpha Agonist: prefer to avoid clonidine due to rebound HTN effects, will consider prazosin if BP spikes start occurring   BB: stop metoprolol due to positional dizz, low HR   Loop Diuretic: none      8. Other CV Risk Factors:     Lipid management:   Secondary prevention per 2018 ACC/AHA guidelines   -Myalgias on rosuva and 1/2 dose pravastatin. Has failed simva, atorva, prava.    -PSCK9i indicated per 2018 ACC/AHA guidelines in this patient with established ASCVD whose LDL-C remains above threshold of 70 mg/dl despite maximally tolerated statin therapy.  -Praluent tolerated, PA approved since 2020, excellent response to pcsk9i  -Stopped zetia as LDL at goal with pcsk9i alone  -persistent hypertrig despite vascepa - stopped vascepa due to intolerance and new onset Afib   Tx goal: non-HDL-C <100,  LDL-C <70  (optional: apoB <80)   At goal:  Yes, 7/2022  Plan:  - reinforced ongoing TLC measures   - monitor labs Q6mo - update labs Q6mo if stable - track direct LDLc due to very low calc LDLc   Meds:   - continue praluent 150mg daily  - continue vitamin D3 [id number] daily  "    DM: type 2, non-insulin   Goal A1c <7.0%  Lab Results   Component Value Date    HBA1C 6.7 (H) 11/18/2022      Lab Results   Component Value Date/Time    MALBCRT 19 11/18/2022 07:37 AM    MICROALBUR 20.0 11/18/2022 07:37 AM    invokana too expensive   Does not seem to be greg Metformin more that once daily  Plan:  - metformin 500 mg Qpm due to SE unable to start ER form.     - holding Invokana 100 mg for now due to expense, consider restart if A1c elevates switch to Jardiance or Farxiga if cost is better  - consider GLP1RA weekly as 3rd agent   - recommmend for routine care with PCP (or endocrine) to include regular A1c monitoring, annual albumin/creatinine ratio (ACR), annual diabetic retinopathy screening, foot exams, annual flu vaccine, and updates to pneumonia vaccines as appropriate     Smoking: ended year 1987 or pack years of use 25    ANTITHROMBOTIC THERAPY:   - continue VKA and INR mgmt per PCP's office   - if low TTR%, recommend to eat consistent, small amount of Vit K food daily to establish baseline Vit D to reduce fluctuations   - if stable and no recurrent afib    9. Other Issues:    # hypogonadism, erectile dysfunction, stable and improved off thiazide and spironolactone.  Pt aware of adverse effects of testosterone on lipids  - defer mgmt to PCP and/or urology/endocrine     # vertigo, episodic with hearing loss   - as reviewed with pt this could be multifactorial or contributed to a specific condition vs medication   - current pattern does not necessarily fit a med ADRs nor HTN related that I am able to discern.    - most recent echo normal, Afib and HR in stable range,  BP excursions are limited and ranges would not traditionally be implicated in dizziness causation   - we reviewed that the following meds could be problematic:    Causes of stomach uneasiness, nausea - metformin, doxycycline, acarbose   Dizziness - amlodipine, diltiazem, eplerenone  - at next visit -- We can consider a \"drug " "holiday\" for any of these meds in the future where we stop a med for 1-2 weeks but I recommend eval with PCP 1st further further evaluation of his sx      Studies Ordered At This Visit:  None   Blood Work to Be Obtained Prior to Next Visit:  As noted   Disposition: 6mo, sooner prn     Javad Childs M.D.   [facility]   [facility]   [phone]   "

## 2023-07-05 DIAGNOSIS — E78.5 DYSLIPIDEMIA: ICD-10-CM

## 2023-07-05 RX ORDER — ALIROCUMAB 150 MG/ML
INJECTION, SOLUTION SUBCUTANEOUS
Qty: 6 ML | Refills: 3 | Status: SHIPPED | OUTPATIENT
Start: 2023-07-05

## 2023-07-18 ENCOUNTER — TELEPHONE (OUTPATIENT)
Dept: VASCULAR LAB | Facility: MEDICAL CENTER | Age: 78
End: 2023-07-18

## 2023-07-18 DIAGNOSIS — I1A.0 RESISTANT HYPERTENSION: ICD-10-CM

## 2023-07-18 RX ORDER — SPIRONOLACTONE 25 MG/1
25 TABLET ORAL DAILY
Qty: 90 TABLET | Refills: 3 | Status: SHIPPED | OUTPATIENT
Start: 2023-07-18

## 2023-07-18 NOTE — PROGRESS NOTES
Spoke with pt over phone regarding eplerenone which is backordered for 3-4 weeks per pharmacy.  Will change pt to spironolactone 25mg once daily until his next appt in August.  Pt states understanding and has [facility] correct phone number for any questions.    Xochitl RANDOLPH  [facility]

## 2023-07-18 NOTE — TELEPHONE ENCOUNTER
[facility] staff:  Received message from primary care office that patient called there and requested a med refill.  Can you please contact to clarify and verify he has appropriate contact info for [facility] med office  thanks!

## 2023-07-25 DIAGNOSIS — I1A.0 RESISTANT HYPERTENSION: ICD-10-CM

## 2023-07-25 RX ORDER — AMLODIPINE BESYLATE 5 MG/1
5 TABLET ORAL
Qty: 90 TABLET | Refills: 0 | Status: SHIPPED | OUTPATIENT
Start: 2023-07-25 | End: 2023-10-23

## 2023-08-24 ENCOUNTER — OFFICE VISIT (OUTPATIENT)
Dept: VASCULAR LAB | Facility: MEDICAL CENTER | Age: 78
End: 2023-08-24
Attending: FAMILY MEDICINE
Payer: COMMERCIAL

## 2023-08-24 VITALS
SYSTOLIC BLOOD PRESSURE: 135 MMHG | BODY MASS INDEX: 33.75 KG/M2 | HEIGHT: 66 IN | WEIGHT: 210 LBS | HEART RATE: 69 BPM | DIASTOLIC BLOOD PRESSURE: 71 MMHG

## 2023-08-24 DIAGNOSIS — I51.7 MILD CONCENTRIC LEFT VENTRICULAR HYPERTROPHY (LVH): ICD-10-CM

## 2023-08-24 DIAGNOSIS — I25.10 CORONARY ARTERY DISEASE INVOLVING NATIVE HEART WITHOUT ANGINA PECTORIS, UNSPECIFIED VESSEL OR LESION TYPE: ICD-10-CM

## 2023-08-24 DIAGNOSIS — E78.2 MIXED HYPERLIPIDEMIA: ICD-10-CM

## 2023-08-24 DIAGNOSIS — I1A.0 RESISTANT HYPERTENSION: ICD-10-CM

## 2023-08-24 DIAGNOSIS — G47.33 OSA TREATED WITH BIPAP: ICD-10-CM

## 2023-08-24 DIAGNOSIS — Z79.02 LONG TERM (CURRENT) USE OF ANTITHROMBOTICS/ANTIPLATELETS: ICD-10-CM

## 2023-08-24 DIAGNOSIS — D68.69 SECONDARY HYPERCOAGULABLE STATE (HCC): ICD-10-CM

## 2023-08-24 DIAGNOSIS — E11.9 TYPE 2 DIABETES MELLITUS WITHOUT COMPLICATION, WITHOUT LONG-TERM CURRENT USE OF INSULIN (HCC): ICD-10-CM

## 2023-08-24 DIAGNOSIS — I48.0 PAROXYSMAL ATRIAL FIBRILLATION (HCC): ICD-10-CM

## 2023-08-24 DIAGNOSIS — Z95.1 HX OF CABG: ICD-10-CM

## 2023-08-24 PROCEDURE — 99214 OFFICE O/P EST MOD 30 MIN: CPT | Performed by: FAMILY MEDICINE

## 2023-08-24 PROCEDURE — 99212 OFFICE O/P EST SF 10 MIN: CPT

## 2023-08-24 PROCEDURE — 3078F DIAST BP <80 MM HG: CPT | Performed by: FAMILY MEDICINE

## 2023-08-24 PROCEDURE — 3075F SYST BP GE 130 - 139MM HG: CPT | Performed by: FAMILY MEDICINE

## 2023-08-24 ASSESSMENT — ENCOUNTER SYMPTOMS
FEVER: 0
COUGH: 0
NAUSEA: 0
CHILLS: 0
PALPITATIONS: 0
MYALGIAS: 0
ORTHOPNEA: 0
BRUISES/BLEEDS EASILY: 0
WEAKNESS: 0
CLAUDICATION: 0

## 2023-08-24 ASSESSMENT — FIBROSIS 4 INDEX: FIB4 SCORE: 1.62

## 2023-08-24 NOTE — PROGRESS NOTES
RESISTANT HTN CLINIC - Follow-up   23      Subjective     Wang Forman Jr. is a male seen for evaluation of resistant HTN and management.   Wang Forman Jr. is referred by:cardiologist Scott Cameron MD, est 2019    Interval hx/concerns: last seen 6mo ago, stable overall.  Intermittent dizziness but non-progressive.  No other new concerns.    Had changed praluent from Q4wk to Q2wk - had labs done      HTN:  Home BP los-low 140s/70s   Adherence to current HTN meds: compliant all of the time     Afib, s/p ablation: s/p ablation and no recurrence.  Now off diltiazem and VKA.  No sx     Antiplatelet/anticoagulation: stopped VKA, no current meds     Hyperlipidemia:  stable, using praluent 150mg [id number]       T2D:  No changes, tolerating meds.     Last A1c   Lab Results   Component Value Date    HBA1C 6.7 (H) 2023       Current Outpatient Medications:     amLODIPine, 5 mg, Oral, QHS, Taking    spironolactone, 25 mg, Oral, DAILY, Taking    Praluent, INJECT 1 SYRINGE  SUBCUTANEOUSLY EVERY 2  WEEKS, Taking    acarbose, Take 1 tablet by mouth twice daily, Taking    acarbose, 50 mg, Oral, TID WITH MEALS, Taking    pantoprazole, TAKE 1 TABLET BY MOUTH ONCE DAILY, Taking    doxazosin, TAKE 1 TABLET BY MOUTH ONCE DAILY FOR BLOOD PRESSURE  AND FOR PROSTATE, Taking    metFORMIN, TAKE 1 TABLET BY MOUTH ONCE DAILY, Taking    promethazine-dextromethorphan, 5 mL, Oral, Q4HRS PRN, Taking    trandolapril, TAKE 1 TABLET BY MOUTH ONCE DAILY IN THE EVENING, Taking    amoxicillin, , Taking    chlorhexidine, , Taking    oxybutynin SR, 5 mg, Oral, DAILY, Taking    acetaminophen, 500-1,000 mg, Oral, Q6HRS PRN, PRN    acetaminophen-codeine #3, , Taking    doxycycline, Take 50 mg by mouth., Taking    HYDROcodone-acetaminophen, TAKE 1 TABLET BY MOUTH EVERY 6 HOURS AS NEEDED FOR MODERATE TO SEVERE POST PROCEDURE PAIN FOR 5 DAYS, Taking    traMADol, TAKE 1 TABLET BY MOUTH TWICE DAILY AS NEEDED FOR  "MODERATE TO SEVERE PAIN, Taking    nitroglycerin, , PRN    vitamin D3, 400 Units, Oral, DAILY, Taking    therapeutic multivitamin-minerals, 1 Tablet, Oral, DAILY, Taking    testosterone cypionate, 200 mg, Intramuscular, Q21 DAYS, Taking    Current Facility-Administered Medications:     testosterone cypionate       Social History     Tobacco Use    Smoking status: Former     Current packs/day: 0.00     Average packs/day: 2.0 packs/day for 25.0 years (50.0 ttl pk-yrs)     Types: Cigarettes     Start date: 1962     Quit date: 1987     Years since quittin.6    Smokeless tobacco: Never   Vaping Use    Vaping Use: Never used   Substance Use Topics    Alcohol use: Not Currently     Alcohol/week: 0.0 oz     Comment: a few times a year    Drug use: No      Review of Systems   Constitutional:  Negative for chills and fever.   Respiratory:  Negative for cough.    Cardiovascular:  Negative for chest pain, palpitations, orthopnea, claudication and leg swelling.   Gastrointestinal:  Negative for nausea.   Musculoskeletal:  Negative for myalgias.   Neurological:  Negative for weakness.   Endo/Heme/Allergies:  Does not bruise/bleed easily.        Objective     Vitals:    23 1358 23 1402   BP: (!) 145/70 135/71   BP Location: Left arm Left arm   Patient Position: Sitting Sitting   BP Cuff Size: Adult Adult   Pulse: 72 69   Weight: 95.3 kg (210 lb)    Height: 1.676 m (5' 6\")          BP Readings from Last 5 Encounters:   23 135/71   23 135/70   22 120/66   22 130/73   22 (!) 144/80      Body mass index is 33.89 kg/m².    Physical Exam  Constitutional:       General: He is not in acute distress.     Appearance: He is well-developed. He is not diaphoretic.   Neck:      Vascular: No JVD.   Cardiovascular:      Rate and Rhythm: Normal rate and regular rhythm.      Heart sounds: Normal heart sounds. No murmur heard.     No friction rub. No gallop.   Pulmonary:      Effort: Pulmonary " "effort is normal. No respiratory distress.      Breath sounds: Normal breath sounds. No wheezing or rales.   Musculoskeletal:         General: No tenderness.   Skin:     General: Skin is warm and dry.      Coloration: Skin is not pale.      Findings: No erythema or rash.   Neurological:      Mental Status: He is alert and oriented to person, place, and time.      Coordination: Coordination normal.   Psychiatric:         Behavior: Behavior normal.          Accessory Clinical Findings:      Lab Results   Component Value Date    CHOLSTRLTOT 101 (L) 08/22/2023    LDL 1 08/22/2023    HDL 35.0 (L) 08/22/2023    TRIGLYCERIDE 323 (H) 08/22/2023           No results found for: \"LIPOPROTA\"   No results found for: \"APOB\"      Lab Results   Component Value Date    HBA1C 6.7 (H) 08/22/2023      Lab Results   Component Value Date    SODIUM 139 08/22/2023    POTASSIUM 4.3 08/22/2023    CHLORIDE 104 08/22/2023    CO2 25 08/22/2023    GLUCOSE 157 (H) 08/22/2023    BUN 15 08/22/2023    CREATININE 1.3 08/22/2023    GLOMRATE 55 (L) 02/28/2023          Lab Results   Component Value Date    URCREAT 133 08/22/2023    MICROALBUR 15.7 08/22/2023    MALBCRT 12 08/22/2023     VASCULAR IMAGING:     Echo 2017  No prior study is available for comparison.   Left ventricular ejection fraction is visually estimated to be 60%.   Normal diastolic function. Poor endocardial definition difficult to   assess wall motion.  No evidence of valvular abnormality based on Doppler evaluation.   Estimated right ventricular systolic pressure  is 25 mmHg.    Echo 7/25/19  Normal left ventricular systolic function. Left ventricular ejection   fraction is visually estimated to be 55%.   Normal diastolic function.    Renal art duplex 7/31/19  Unremarkable renal sonogram with no evidence of renal artery stenosis as demonstrated above    Echo 10/2022  There is mild concentric left ventricular hypertrophy. Left ventricular systolic function is normal.   The Ejection " Fraction estimate is 55-60%   There is moderate mitral regurgitation   There is mild to moderate aortic stenosis   Right ventricular systolic pressure is elevated at 35-40 mmHg         ASSESSMENT / PLAN:  1. Resistant hypertension  Referral to Vascular Medicine    Comp Metabolic Panel    Lipid Profile    CBC WITHOUT DIFFERENTIAL      2. Coronary artery disease involving native heart without angina pectoris, unspecified vessel or lesion type  Comp Metabolic Panel    Lipid Profile    CBC WITHOUT DIFFERENTIAL      3. Hx of CABG        4. Mixed hyperlipidemia  APOLIPOPROTEIN B    LDL, DIRECT      5. Type 2 diabetes mellitus without complication, without long-term current use of insulin (HCC)  Comp Metabolic Panel    Lipid Profile    CBC WITHOUT DIFFERENTIAL    HEMOGLOBIN A1C      6. Paroxysmal atrial fibrillation (HCC)        7. Secondary hypercoagulable state (HCC)        8. CHOCO treated with BiPAP        9. Mild concentric left ventricular hypertrophy (LVH)        10. Long term (current) use of antithrombotics/antiplatelets           1. Blood Pressure Control:  Qualifies for resistant HTN due to 4 meds w/o control   ACC/AHA (2017) goal <130/80, consider less stringent goals based upon pt preference and polypharmacy potential, though <140/80 at the most    Home BP at goal:  yes   Office BP at goal: yes, white coat effect    24h ABPM (12/2019): Mean daytime: 146/75 consistent with poorly controlled out of office  systolic blood pressure. Nocturnal dip: Appears normal   Device candidate? No    - PRIOR wider pulse pressure indicative of arteriosclerosis - improved   Plan:   Monitoring:   - continue home BP monitoring, reviewed correct technique:  Yes   - order 24h ABPM:  no  - monitor lytes/gfr routinely     2. Work up of Secondary Causes of Resistant Hypertension:   Renovascular HTN: Excluded  Primary Aldosteronism: Excluded, high renin  Thyroid Function: Excluded     CHOCO on CPAP  Strongly encouraged CPAP adherence to  reduce RV strain, improve overall fatigue symptoms, and improve BP in both the short- and long-term as per HIPARCO (2013) and HIPARCO-2 (2021) studies.   - continue CPAP, defer mgmt to sleep MD    Pheochromocytoma: Not Yet Assessed  Other: none  Recommendations At This Visit:        3. Medication Use / Adherence:  Assessment: Complete  Recommendations: Instructed to Continue Taking All Medications As Prescribed         4. End Organ Damage:   Left Ventricular Hypertrophy: yes on  Echocardiogram Date: 2022  Albuminuria: Microalbuminuria, level A2 on Date: 2023  Renal Function: Chronic Kidney Disease  Stage 3a - stable   - avoid nephrotoxins  - continue HTN control with RAAS blockade   - monitor lytes/gfr routinely  - eval with nephrology if worsening over time    Established Cardiovascular Disease:    # CAD - s/p CABG, 12/2013, stable  still in high risk stage <10yrs from operation   - continue aggressive med mgmt, qualifies for pcsk9i  - ongoing care with cardiology    # Afib - hx of paroxysmal Afib post-op CABG, recurrent   Appears to be contemporaneous to the vascepa initiation  No current, has been off BB and xarelto in 2013.   - continue diltiazem and OAC for CVA proph    - pending ablation   - Continue monitoring per cardiology    5. Lifestyle Recommendations:    SMOKING:    reports that he quit smoking about 36 years ago. His smoking use included cigarettes. He started smoking about 61 years ago. He has a 50.0 pack-year smoking history. He has never used smokeless tobacco.   - continued complete avoidance of all tobacco products     PHYSICAL ACTIVITY: continue healthy activity to improve CV fitness.  In general, targeting >150min/week of moderate-level activity.      WEIGHT MANAGEMENT AND NUTRITION: Mediterranean style dietary approach       6. Standard HTN Pharmacotherapy:  - continue trandolapril 4mg QHS for true 24h coverage  Thiazide Type Diuretic: stop due to worsening ED  - continue amlodipine 5mg daily -  aware on nonDHP-CCB too     7. Additional Agents:  - continue eplerenone 50mg BID, consider reduction to 25mg BID in future   - continue doxazosin to 2mg QHS 1 full tab, can then consider increase to 4mg QHS  - stopped diltiazem 180mg ER daily   - failed BB in past due to dizz, allyson     8. Other CV Risk Factors:     Lipid management:   Secondary prevention per 2018 ACC/AHA guidelines   -Myalgias on rosuva and 1/2 dose pravastatin. Has failed simva, atorva, prava.    -PSCK9i indicated per 2018 ACC/AHA guidelines in this patient with established ASCVD whose LDL-C remains above threshold of 70 mg/dl despite maximally tolerated statin therapy.  -Praluent tolerated, PA approved since 2020, excellent response to pcsk9i  -Stopped zetia as LDL at goal with pcsk9i alone  -persistent hypertrig despite vascepa - stopped vascepa due to intolerance and new onset Afib  - considering LDL-C = 1, we have opted to reduce praluent to 150mg monthly, alternative option is 75mg Q2wks - pt prefers the former   Tx goal: LDL-C <55 (apoB <70-80)   At goal:  yes, 8/2023  Plan:  - reinforced ongoing TLC measures   - labs Q6mo - monitor direct LDL-C due to low calc LDL-C and apoB due to TG >200   Meds:   - continue praluent 150mg monthly   - continue vitamin D3 0141-6263 daily     DM: type 2, non-insulin   Goal A1c <7.0%  Lab Results   Component Value Date    HBA1C 6.7 (H) 08/22/2023      Lab Results   Component Value Date/Time    MALBCRT 12 08/22/2023 07:48 AM    MICROALBUR 15.7 08/22/2023 07:48 AM    invokana too expensive   Does not seem to be greg Metformin more that once daily  Plan:  - metformin 500 mg Qpm due to SE unable to start ER form.     - holding Invokana 100 mg for now due to expense, consider restart if A1c elevates switch to Jardiance or Farxiga if cost is better  - consider GLP1RA weekly as 3rd agent   - recommmend for routine care with PCP (or endocrine) to include regular A1c monitoring, annual albumin/creatinine ratio  "(ACR), annual diabetic retinopathy screening, foot exams, annual flu vaccine, and updates to pneumonia vaccines as appropriate     Smoking: ended year 1987 or pack years of use 25    ANTITHROMBOTIC THERAPY: continue ASA 81mg daily   Completed VKA after ablation - no further Aflut/fib     9. Other Issues:    # hypogonadism, erectile dysfunction, stable and improved off thiazide and spironolactone.  Pt aware of adverse effects of testosterone on lipids  - defer mgmt to PCP and/or urology/endocrine     # vertigo, episodic with hearing loss   - as reviewed with pt this could be multifactorial or contributed to a specific condition vs medication   - current pattern does not necessarily fit a med ADRs nor HTN related that I am able to discern.    - most recent echo normal, Afib and HR in stable range,  BP excursions are limited and ranges would not traditionally be implicated in dizziness causation   - we reviewed that the following meds could be problematic:    Causes of stomach uneasiness, nausea - metformin, doxycycline, acarbose   Dizziness - amlodipine, diltiazem, eplerenone  - at next visit -- We can consider a \"drug holiday\" for any of these meds in the future where we stop a med for 1-2 weeks but I recommend eval with PCP 1st further further evaluation of his sx      Studies Ordered At This Visit:  None   Blood Work to Be Obtained Prior to Next Visit:  As noted   Disposition: 6mo, sooner mariia Childs M.D.   [facility]   [facility]   [phone]   "

## 2023-08-30 ENCOUNTER — DOCUMENTATION (OUTPATIENT)
Dept: PHARMACY | Facility: MEDICAL CENTER | Age: 78
End: 2023-08-30

## 2023-08-30 PROCEDURE — RXMED WILLOW AMBULATORY MEDICATION CHARGE

## 2023-08-31 ENCOUNTER — PHARMACY VISIT (OUTPATIENT)
Dept: PHARMACY | Facility: MEDICAL CENTER | Age: 78
End: 2023-08-31
Payer: COMMERCIAL

## 2023-08-31 NOTE — PROGRESS NOTES
"  (IM1) PHARMACIST NEW START - Initial Chart Review  Dx: Dyslipidemia (E78.5)     Txt prescribed:  Praluent 150mg/ml 1 pen SC every 2 weeks      Patient declined initial assessment.     [facility] to review and/or confirm with patient at next call:    - Administration   - Duration of therapy   - Common DI & Dietary avoidance   - Adherence & Missed dose management   - Proper Handling/Disposal   - Storage/Excursion   - Common & Serious SE, Precautions and SE Mitigation/Management   - Wellness/lifestyle counseling   - Clinically relevant, abnormal labs (if needed)   - Med Rec   - Current Drug Interaction Monitoring (if any exist)      Goals of Therapy:   - Achieve goal LDL levels (<  mg/dL) to reduce risk of cardiovascular events  - Implement lifestyle modifications: diet, exercise, weight loss, and smoking cessation       Goals of therapy to be discussed with patient during Specialty Pharmacist call.      Additional pertinent info/MD notes: Pt said he has been on the medication for sometime now after failing multiple statins (prava, simva, atorva due to myalgias) and would like ot opt out clinical service and continue to receive refill calls. Patient LDL is well controlled at this time.   Lipid Panel (23): TC: 101L; TH; LDL: 1; HDL: 35.0L    \"Patient opted out of clinical service as of 2023\"     - Nasreen shaikh  "

## 2023-09-14 DIAGNOSIS — I1A.0 RESISTANT HYPERTENSION: ICD-10-CM

## 2023-09-14 RX ORDER — TRANDOLAPRIL TABLETS 4 MG/1
TABLET ORAL
Qty: 90 TABLET | Refills: 3 | Status: SHIPPED | OUTPATIENT
Start: 2023-09-14

## 2023-11-13 ENCOUNTER — TELEPHONE (OUTPATIENT)
Dept: PHARMACY | Facility: MEDICAL CENTER | Age: 78
End: 2023-11-13

## 2023-11-13 NOTE — TELEPHONE ENCOUNTER
PJ Piña. He stated the Dr has him  temporarily taking differently right now and not taking every 2 weeks (going further apart for now so has some extra on hand) until he can get some blood work back to determine otherwise and see where to go from there. He has enough on hand at this time to last until end of year. Since in [facility], he will wait for f/u call until first week of January. Postponed next call back date appropriately

## 2023-12-14 ENCOUNTER — TELEPHONE (OUTPATIENT)
Dept: PHARMACY | Facility: MEDICAL CENTER | Age: 78
End: 2023-12-14

## 2023-12-14 NOTE — TELEPHONE ENCOUNTER
Alirocumab (PRALUENT) 150 MG/ML Solution Auto-injector      PA renewal      Initiated pa in m Key: [id number]    Prior Authorization for Praluent has been approved for a quantity of 2 , day supply 28    Prior Authorization reference number: [id number]  Insurance: [employer]   Effective dates: approved through 12/31/2024  Copay: $131.50     Is patient eligible to fill with [facility]? Yes    Next Steps: The patient's copay exceeds $5.00. Proceed with contacting patient to offer financial assistance.

## 2023-12-24 ENCOUNTER — OFFICE VISIT (OUTPATIENT)
Dept: URGENT CARE | Facility: CLINIC | Age: 78
End: 2023-12-24
Payer: COMMERCIAL

## 2023-12-24 VITALS
HEART RATE: 66 BPM | TEMPERATURE: 97.8 F | SYSTOLIC BLOOD PRESSURE: 112 MMHG | WEIGHT: 199 LBS | DIASTOLIC BLOOD PRESSURE: 56 MMHG | HEIGHT: 66 IN | RESPIRATION RATE: 16 BRPM | OXYGEN SATURATION: 96 % | BODY MASS INDEX: 31.98 KG/M2

## 2023-12-24 DIAGNOSIS — H66.002 NON-RECURRENT ACUTE SUPPURATIVE OTITIS MEDIA OF LEFT EAR WITHOUT SPONTANEOUS RUPTURE OF TYMPANIC MEMBRANE: ICD-10-CM

## 2023-12-24 PROCEDURE — 3074F SYST BP LT 130 MM HG: CPT

## 2023-12-24 PROCEDURE — 3078F DIAST BP <80 MM HG: CPT

## 2023-12-24 PROCEDURE — 99213 OFFICE O/P EST LOW 20 MIN: CPT

## 2023-12-24 RX ORDER — AMOXICILLIN AND CLAVULANATE POTASSIUM 875; 125 MG/1; MG/1
1 TABLET, FILM COATED ORAL 2 TIMES DAILY
Qty: 14 TABLET | Refills: 0 | Status: SHIPPED | OUTPATIENT
Start: 2023-12-24 | End: 2023-12-31

## 2023-12-24 ASSESSMENT — FIBROSIS 4 INDEX: FIB4 SCORE: 1.46

## 2023-12-24 NOTE — PROGRESS NOTES
Subjective:   Wang Forman Jr. is a 78 y.o. male who presents for Otalgia (L ear pain x 1.5 days )      HPI:    Patient presents to urgent care with concerns of left ear pain   Pain was first noticed almost 2 days ago and is worsening  Reports history of allergies causing runny nose and congestion  Denies fever or chills  Denies otorrhea.  Pain radiates to his jaw.   Denies headache, neck pain, photophobia  Reports recent URI symptoms  Denies wheezing, SOB, chest tightness  Denies increased use of   Tolerating solids and fluids  Endorses normal urinary output  Denies rash      ROS As above in HPI    Medications:    Current Outpatient Medications on File Prior to Visit   Medication Sig Dispense Refill    chlorproMAZINE (THORAZINE) 25 MG Tab Take 1 Tablet by mouth 2 times a day as needed (hiccups). 60 Tablet 1    zolpidem (AMBIEN) 5 MG Tab Take 1 Tablet by mouth at bedtime as needed for Sleep for up to 30 doses. 30 Tablet 0    oxybutynin SR (DITROPAN-XL) 5 MG TABLET SR 24 HR Take 1 Tablet by mouth every day. 90 Tablet 1    amLODIPine (NORVASC) 5 MG Tab TAKE 1 TABLET BY MOUTH AT BEDTIME 90 Tablet 3    metFORMIN (GLUCOPHAGE) 500 MG Tab TAKE 1 TABLET BY MOUTH ONCE  DAILY 90 Tablet 3    trandolapril (MAVIK) 4 MG Tab TAKE 1 TABLET BY MOUTH ONCE DAILY IN THE EVENING 90 Tablet 3    spironolactone (ALDACTONE) 25 MG Tab Take 1 Tablet by mouth every day. 90 Tablet 3    Alirocumab (PRALUENT) 150 MG/ML Solution Auto-injector INJECT 1 SYRINGE  SUBCUTANEOUSLY EVERY 2  WEEKS 6 mL 3    acarbose (PRECOSE) 50 MG Tab Take 1 tablet by mouth twice daily 180 Tablet 0    pantoprazole (PROTONIX) 40 MG Tablet Delayed Response TAKE 1 TABLET BY MOUTH ONCE DAILY 90 Tablet 3    doxazosin (CARDURA) 2 MG Tab TAKE 1 TABLET BY MOUTH ONCE DAILY FOR BLOOD PRESSURE  AND FOR PROSTATE 90 Tablet 3    chlorhexidine (PERIDEX) 0.12 % Solution       acetaminophen (TYLENOL) 500 MG Tab Take 500-1,000 mg by mouth every 6 hours as needed.      traMADol  (ULTRAM) 50 MG Tab TAKE 1 TABLET BY MOUTH TWICE DAILY AS NEEDED FOR MODERATE TO SEVERE PAIN      nitroglycerin (NITROSTAT) 0.4 MG SL Tab       cholecalciferol (VITAMIN D3) 400 UNIT Tab Take 400 Units by mouth every day.      therapeutic multivitamin-minerals (THERAGRAN-M) Tab Take 1 Tab by mouth every day.      testosterone cypionate (DEPO-TESTOSTERONE) 200 MG/ML Solution injection 1 mL by Intramuscular route every 21 days. 1 mL 11    promethazine-dextromethorphan (PROMETHAZINE-DM) 6.25-15 MG/5ML syrup Take 5 mL by mouth every four hours as needed for Cough. (Patient not taking: Reported on 12/24/2023) 140 mL 0    Acetaminophen-Codeine 300-30 MG Tab  (Patient not taking: Reported on 12/24/2023)      doxycycline (MONODOX) 50 MG capsule Take 50 mg by mouth. (Patient not taking: Reported on 12/24/2023)      HYDROcodone-acetaminophen (NORCO) 5-325 MG Tab per tablet TAKE 1 TABLET BY MOUTH EVERY 6 HOURS AS NEEDED FOR MODERATE TO SEVERE POST PROCEDURE PAIN FOR 5 DAYS (Patient not taking: Reported on 12/24/2023)       Current Facility-Administered Medications on File Prior to Visit   Medication Dose Route Frequency Provider Last Rate Last Admin    testosterone cypionate (DEPO-TESTOSTERONE) injection 200 mg  200 mg Intramuscular Q21 DAYS Natanael Sin M.D.   200 mg at 12/14/23 1020        Allergies:   Naproxen, Nsaids, Sulfa drugs, Atenolol, Codeine, Hydrochlorothiazide, Morphine, Vascepa [epa ethyl shona], Hmg-coa-r inhibitors, and Rosuvastatin calcium    Problem List:   Patient Active Problem List   Diagnosis    Type 2 diabetes mellitus without complication, without long-term current use of insulin (HCC)    Resistant hypertension    ASTHMA    Sleep apnea    Environmental allergies    Dyslipidemia    Restless leg syndrome    Atherosclerosis of native coronary artery of native heart without angina pectoris    Osteoarthritis    DISH (diffuse idiopathic skeletal hyperostosis)    Hypogonadism male    Lumbar disc disease     Cervical disc disease    Insomnia    Paroxysmal atrial fibrillation (HCC)    Diet-controlled type 2 diabetes mellitus (CMS-HCC)    Colon polyps    DDD (degenerative disc disease), lumbar    Degeneration of lumbar intervertebral disc    Low back pain    Erectile dysfunction, unspecified erectile dysfunction type    Encounter for long-term (current) use of insulin (HCC)    Hypertriglyceridemia    Hx of CABG    LVH (left ventricular hypertrophy)    Hyponatremia    CHOCO treated with BiPAP    Urge incontinence    Balance disorder    Chronic anticoagulation    OAB (overactive bladder)    Class 1 obesity    Atrial flutter, unspecified type (HCC)    Mixed hyperlipidemia    Metabolic syndrome    Secondary hypercoagulable state (HCC)    Mild concentric left ventricular hypertrophy (LVH)    Long term (current) use of antithrombotics/antiplatelets        Surgical History:  Past Surgical History:   Procedure Laterality Date    LUMBAR LAMINECTOMY DISKECTOMY  2/13/2018    Procedure: LUMBAR LAMINECTOMY DISKECTOMY- FOR REDO L3-5 LAMINOTOMIES W/SYNOVIAL CYST RESECTION;  Surgeon: Lenny Lew M.D.;  Location: SURGERY [facility];  Service: Neurosurgery    LUMBAR LAMINECTOMY DISKECTOMY  10/24/2017    Procedure: LUMBAR LAMINECTOMY DISKECTOMY L2-5;  Surgeon: Lenny Lew M.D.;  Location: SURGERY [facility];  Service: Neurosurgery    OTHER NEUROLOGICAL SURG  03/2017    hardware in neck    MULTIPLE CORONARY ARTERY BYPASS  12/5/2013    [facility]    CARPAL TUNNEL ENDOSCOPIC  2011    RT    KNEE ARTHROSCOPY  2009    RT    ANGIOPLASTY  2009    ADDITIONAL DRUG-ELUTING STENT IN CIRCUMFLEX    ANGIOPLASTY  2004    MULTIVESSEL ANGIOPLASTY AND DRUG-ELUTING STENTS    CATARACT EXTRACTION WITH IOL      LUMBAR LAMINECTOMY DISKECTOMY      UVULOPHARYNGOPALATOPLASTY         Past Social Hx:   Social History     Tobacco Use    Smoking status: Former     Current packs/day: 0.00     Average packs/day: 2.0 packs/day for 25.0 years (50.0 ttl  "pk-yrs)     Types: Cigarettes     Start date: 1962     Quit date: 1987     Years since quittin.0    Smokeless tobacco: Never   Vaping Use    Vaping Use: Never used   Substance Use Topics    Alcohol use: Not Currently     Alcohol/week: 0.0 oz     Comment: a few times a year    Drug use: No          Problem list, medications, and allergies reviewed by myself today in Epic.     Objective:     /56 (BP Location: Left arm, Patient Position: Sitting, BP Cuff Size: Adult)   Pulse 66   Temp 36.6 °C (97.8 °F) (Temporal)   Resp 16   Ht 1.676 m (5' 6\")   Wt 90.3 kg (199 lb)   SpO2 96%   BMI 32.12 kg/m²     Physical Exam  Vitals and nursing note reviewed.   Constitutional:       General: He is not in acute distress.     Appearance: Normal appearance. He is not ill-appearing or diaphoretic.   HENT:      Head: Normocephalic.      Right Ear: Tympanic membrane and ear canal normal.      Left Ear: Ear canal normal. A middle ear effusion is present. No mastoid tenderness. Tympanic membrane is erythematous and bulging.      Nose: Congestion and rhinorrhea present. Rhinorrhea is clear.      Right Sinus: No maxillary sinus tenderness or frontal sinus tenderness.      Left Sinus: No maxillary sinus tenderness or frontal sinus tenderness.      Mouth/Throat:      Mouth: Mucous membranes are moist.      Pharynx: Oropharynx is clear. Uvula midline. Posterior oropharyngeal erythema present. No pharyngeal swelling, oropharyngeal exudate or uvula swelling.      Tonsils: 0 on the right. 0 on the left.   Cardiovascular:      Rate and Rhythm: Normal rate and regular rhythm.      Heart sounds: Normal heart sounds. No murmur heard.     No friction rub. No gallop.   Pulmonary:      Effort: Pulmonary effort is normal. No respiratory distress.      Breath sounds: Normal breath sounds. No stridor. No wheezing, rhonchi or rales.   Chest:      Chest wall: No tenderness.   Abdominal:      General: Bowel sounds are normal.      " Palpations: Abdomen is soft.   Lymphadenopathy:      Cervical: Cervical adenopathy present.   Skin:     General: Skin is warm and dry.      Capillary Refill: Capillary refill takes less than 2 seconds.      Findings: No rash.   Neurological:      Mental Status: He is alert and oriented to person, place, and time.         Assessment/Plan:       Diagnosis and associated orders:   1. Non-recurrent acute suppurative otitis media of left ear without spontaneous rupture of tympanic membrane  - amoxicillin-clavulanate (AUGMENTIN) 875-125 MG Tab; Take 1 Tablet by mouth 2 times a day for 7 days.  Dispense: 14 Tablet; Refill: 0        Comments/MDM:     Tylenol as needed for pain per package instructions  Continue otc antihistamines, Flonase  Return if no improvement in 48 to 72 hours  Follow-up with primary care advised        Please note that this dictation was created using voice recognition software. I have made a reasonable attempt to correct obvious errors, but I expect that there are errors of grammar and possibly content that I did not discover before finalizing the note.    This note was electronically signed by TOMASA Angeles

## 2023-12-30 ENCOUNTER — APPOINTMENT (OUTPATIENT)
Dept: URGENT CARE | Facility: CLINIC | Age: 78
End: 2023-12-30

## 2024-01-04 ENCOUNTER — TELEPHONE (OUTPATIENT)
Dept: PHARMACY | Facility: MEDICAL CENTER | Age: 79
End: 2024-01-04

## 2024-01-04 NOTE — TELEPHONE ENCOUNTER
Contact:             Phone number: [phone]     Name of person spoken with and relationship to patient: MADELEINE MACHADO  Medication:   Patient’s Adherence:             How patient is doing on medication: well     How many missed doses and reason: 0     Any new medications: no     Any new conditions: no     Any new allergies: no     Any new side effects: no      Any new diagnoses: no      How many doses remainin     Did patient want to speak with pharmacist: no   Delivery:             Delivery date and method:      Needs by Date:      Signature required: no     Any additional details for : CHRISTIN   Teach Appointment Date: CHRISTIN   Shipping Address:    Medication(name, strength and dose): PRALUENT . PT DECLINED RF.    Copay: $   Payment Method:    Supplies:    Additional Information:   MD LOWERED DOSE TO 150MG 1X/MONTH. HE HAS 5 DOSES ON HAND. DOES NOT NEED AT THIS TIME. HE HAS MD APPT IN FEBRUARY.

## 2024-02-12 ENCOUNTER — TELEPHONE (OUTPATIENT)
Dept: VASCULAR LAB | Facility: MEDICAL CENTER | Age: 79
End: 2024-02-12

## 2024-02-13 NOTE — TELEPHONE ENCOUNTER
Established patient  Chart prep for upcoming appointment with Dr. Childs    Any pending/incomplete orders from last visit? Yes Labs(LDL Direct, Apoliproprotein B, Hemoglobin A1C)  patient reminded to try to complete prior to appointment  Were any records requested?  No  Correct appointment type (New/Established/virtual)? Yes  Referral up to date? Yes  Referral attached to appointment (renewals and New patients only)? N/A (established with up-to-date referral)

## 2024-02-20 ENCOUNTER — OFFICE VISIT (OUTPATIENT)
Dept: VASCULAR LAB | Facility: MEDICAL CENTER | Age: 79
End: 2024-02-20
Attending: INTERNAL MEDICINE
Payer: COMMERCIAL

## 2024-02-20 VITALS
HEART RATE: 62 BPM | DIASTOLIC BLOOD PRESSURE: 68 MMHG | SYSTOLIC BLOOD PRESSURE: 137 MMHG | WEIGHT: 212 LBS | HEIGHT: 67 IN | BODY MASS INDEX: 33.27 KG/M2

## 2024-02-20 DIAGNOSIS — E11.9 TYPE 2 DIABETES MELLITUS WITHOUT COMPLICATION, WITHOUT LONG-TERM CURRENT USE OF INSULIN (HCC): ICD-10-CM

## 2024-02-20 DIAGNOSIS — I48.0 PAROXYSMAL ATRIAL FIBRILLATION (HCC): ICD-10-CM

## 2024-02-20 DIAGNOSIS — E78.2 MIXED HYPERLIPIDEMIA: ICD-10-CM

## 2024-02-20 DIAGNOSIS — I1A.0 RESISTANT HYPERTENSION: ICD-10-CM

## 2024-02-20 DIAGNOSIS — I25.10 CORONARY ARTERY DISEASE INVOLVING NATIVE HEART WITHOUT ANGINA PECTORIS, UNSPECIFIED VESSEL OR LESION TYPE: ICD-10-CM

## 2024-02-20 DIAGNOSIS — D68.69 SECONDARY HYPERCOAGULABLE STATE (HCC): ICD-10-CM

## 2024-02-20 PROCEDURE — 3078F DIAST BP <80 MM HG: CPT | Performed by: FAMILY MEDICINE

## 2024-02-20 PROCEDURE — 3075F SYST BP GE 130 - 139MM HG: CPT | Performed by: FAMILY MEDICINE

## 2024-02-20 PROCEDURE — 99212 OFFICE O/P EST SF 10 MIN: CPT

## 2024-02-20 PROCEDURE — 99214 OFFICE O/P EST MOD 30 MIN: CPT | Performed by: FAMILY MEDICINE

## 2024-02-20 RX ORDER — ASPIRIN 81 MG/1
TABLET, CHEWABLE ORAL DAILY
COMMUNITY
End: 2024-02-20

## 2024-02-20 ASSESSMENT — ENCOUNTER SYMPTOMS
FEVER: 0
NAUSEA: 0
MYALGIAS: 0
ORTHOPNEA: 0
BRUISES/BLEEDS EASILY: 0
COUGH: 0
CLAUDICATION: 0
WEAKNESS: 0
CHILLS: 0
PALPITATIONS: 0

## 2024-02-20 ASSESSMENT — FIBROSIS 4 INDEX: FIB4 SCORE: 1.46

## 2024-02-20 NOTE — PROGRESS NOTES
RESISTANT HTN CLINIC - Follow-up   24      Subjective     Wang Forman Jr. is a male seen for evaluation of resistant HTN and management.   Wang Forman Jr. is referred by:cardiologist Scott Cameron MD, est 2019    Interval hx/concerns: last seen 2023, stable overall.  Feeling well. No med or health status changes.       HTN:  Home BP lo-130s/70-80s - improving to lower numbers over time   Adherence to current HTN meds: compliant all of the time     Afib, s/p ablation: s/p ablation and no recurrence.  Now off diltiazem and VKA.  No sx     Antiplatelet/anticoagulation: stopped VKA, continued ASA 81mg daily     Hyperlipidemia:  stable, using praluent 150mg [id number]       T2D: No changes, tolerating meds.     Last A1c   Lab Results   Component Value Date    HBA1C 6.7 (H) 2023       Current Outpatient Medications:     pantoprazole, TAKE 1 TABLET BY MOUTH ONCE  DAILY, Taking    chlorproMAZINE, 25 mg, Oral, BID PRN, Taking    zolpidem, 5 mg, Oral, HS PRN, Taking    oxybutynin SR, 5 mg, Oral, DAILY, Taking    amLODIPine, 5 mg, Oral, QHS, Taking    metFORMIN, TAKE 1 TABLET BY MOUTH ONCE  DAILY, Taking    trandolapril, TAKE 1 TABLET BY MOUTH ONCE DAILY IN THE EVENING, Taking    spironolactone, 25 mg, Oral, DAILY, Taking    Praluent, INJECT 1 SYRINGE  SUBCUTANEOUSLY EVERY 2  WEEKS, Taking    acarbose, Take 1 tablet by mouth twice daily, Taking    doxazosin, TAKE 1 TABLET BY MOUTH ONCE DAILY FOR BLOOD PRESSURE  AND FOR PROSTATE, Taking    chlorhexidine, , Taking    acetaminophen, 500-1,000 mg, Oral, Q6HRS PRN, Taking    traMADol, TAKE 1 TABLET BY MOUTH TWICE DAILY AS NEEDED FOR MODERATE TO SEVERE PAIN, Taking    nitroglycerin, , Taking    vitamin D3, 400 Units, Oral, DAILY, Taking    therapeutic multivitamin-minerals, 1 Tablet, Oral, DAILY, Taking    testosterone cypionate, 200 mg, Intramuscular, Q21 DAYS, Taking    promethazine-dextromethorphan, 5 mL, Oral, Q4HRS PRN  "(Patient not taking: Reported on 2023), Not Taking    acetaminophen-codeine #3,  (Patient not taking: Reported on 2023), Not Taking    doxycycline, Take 50 mg by mouth. (Patient not taking: Reported on 2023), Not Taking    HYDROcodone-acetaminophen, TAKE 1 TABLET BY MOUTH EVERY 6 HOURS AS NEEDED FOR MODERATE TO SEVERE POST PROCEDURE PAIN FOR 5 DAYS (Patient not taking: Reported on 2023), Not Taking    Current Facility-Administered Medications:     testosterone cypionate     Social History     Tobacco Use    Smoking status: Former     Current packs/day: 0.00     Average packs/day: 2.0 packs/day for 25.0 years (50.0 ttl pk-yrs)     Types: Cigarettes     Start date: 1962     Quit date: 1987     Years since quittin.1    Smokeless tobacco: Never   Vaping Use    Vaping Use: Never used   Substance Use Topics    Alcohol use: Not Currently     Alcohol/week: 0.0 oz     Comment: a few times a year    Drug use: No      Review of Systems   Constitutional:  Negative for chills and fever.   Respiratory:  Negative for cough.    Cardiovascular:  Negative for chest pain, palpitations, orthopnea, claudication and leg swelling.   Gastrointestinal:  Negative for nausea.   Musculoskeletal:  Negative for myalgias.   Neurological:  Negative for weakness.   Endo/Heme/Allergies:  Does not bruise/bleed easily.      Objective   Vitals:    24 1332 24 1342   BP: (!) 148/75 137/68   BP Location: Left arm Left arm   Patient Position: Sitting Sitting   BP Cuff Size: Adult Adult   Pulse: 78 62   Weight: 96.2 kg (212 lb)    Height: 1.689 m (5' 6.5\")      BP Readings from Last 5 Encounters:   24 137/68   23 112/56   23 120/65   23 (!) 145/68   10/25/23 (!) 153/72      Body mass index is 33.71 kg/m².  Physical Exam  Constitutional:       General: He is not in acute distress.     Appearance: He is well-developed. He is not diaphoretic.   Neck:      Vascular: No JVD. " "  Cardiovascular:      Rate and Rhythm: Normal rate and regular rhythm.      Heart sounds: Normal heart sounds. No murmur heard.     No friction rub. No gallop.   Pulmonary:      Effort: Pulmonary effort is normal. No respiratory distress.      Breath sounds: Normal breath sounds. No wheezing or rales.   Musculoskeletal:         General: No tenderness.   Skin:     General: Skin is warm and dry.      Coloration: Skin is not pale.      Findings: No erythema or rash.   Neurological:      Mental Status: He is alert and oriented to person, place, and time.      Coordination: Coordination normal.   Psychiatric:         Behavior: Behavior normal.        Accessory Clinical Findings:      Lab Results   Component Value Date    CHOLSTRLTOT 119 (L) 11/10/2023    LDL 44 11/10/2023    HDL 33.0 (L) 11/10/2023    TRIGLYCERIDE 210 (H) 11/10/2023           No results found for: \"LIPOPROTA\"   No results found for: \"APOB\"      Lab Results   Component Value Date    HBA1C 6.7 (H) 08/22/2023      Lab Results   Component Value Date    SODIUM 136 11/10/2023    POTASSIUM 4.3 11/10/2023    CHLORIDE 101 11/10/2023    CO2 26 11/10/2023    GLUCOSE 140 (H) 11/10/2023    BUN 12 11/10/2023    CREATININE 1.2 11/10/2023    GLOMRATE 61 10/18/2023        Lab Results   Component Value Date    URCREAT 122 11/10/2023    MICROALBUR 18.8 11/10/2023    MALBCRT 15 11/10/2023     VASCULAR IMAGING:     Echo 2017  No prior study is available for comparison.   Left ventricular ejection fraction is visually estimated to be 60%.   Normal diastolic function. Poor endocardial definition difficult to   assess wall motion.  No evidence of valvular abnormality based on Doppler evaluation.   Estimated right ventricular systolic pressure  is 25 mmHg.    Echo 7/25/19  Normal left ventricular systolic function. Left ventricular ejection   fraction is visually estimated to be 55%.   Normal diastolic function.    Renal art duplex 7/31/19  Unremarkable renal sonogram with no " evidence of renal artery stenosis as demonstrated above    Echo 10/2022  There is mild concentric left ventricular hypertrophy. Left ventricular systolic function is normal.   The Ejection Fraction estimate is 55-60%   There is moderate mitral regurgitation   There is mild to moderate aortic stenosis   Right ventricular systolic pressure is elevated at 35-40 mmHg       ASSESSMENT / PLAN:  1. Resistant hypertension  APOLIPOPROTEIN B    CRP HIGH SENSITIVE (CARDIAC)    Lipid Profile    HEMOGLOBIN A1C    MICROALBUMIN CREAT RATIO URINE      2. Mixed hyperlipidemia  APOLIPOPROTEIN B    CRP HIGH SENSITIVE (CARDIAC)    Lipid Profile    HEMOGLOBIN A1C    MICROALBUMIN CREAT RATIO URINE      3. Type 2 diabetes mellitus without complication, without long-term current use of insulin (HCC)  APOLIPOPROTEIN B    CRP HIGH SENSITIVE (CARDIAC)    Lipid Profile    HEMOGLOBIN A1C    MICROALBUMIN CREAT RATIO URINE      4. Paroxysmal atrial fibrillation (HCC)  APOLIPOPROTEIN B    CRP HIGH SENSITIVE (CARDIAC)    Lipid Profile    HEMOGLOBIN A1C    MICROALBUMIN CREAT RATIO URINE      5. Secondary hypercoagulable state (HCC)  APOLIPOPROTEIN B    CRP HIGH SENSITIVE (CARDIAC)    Lipid Profile    HEMOGLOBIN A1C    MICROALBUMIN CREAT RATIO URINE      6. Coronary artery disease involving native heart without angina pectoris, unspecified vessel or lesion type  APOLIPOPROTEIN B    CRP HIGH SENSITIVE (CARDIAC)    Lipid Profile    HEMOGLOBIN A1C    MICROALBUMIN CREAT RATIO URINE           1. Blood Pressure Control:  Qualifies for resistant HTN due to 4 meds w/o control   ACC/AHA (2017) goal <130/80, will consider <140 reasonable    Home BP at goal:  yes   Office BP at goal: yes, white coat effect noted    24h ABPM (12/2019): Mean daytime: 146/75 consistent with poorly controlled out of office  systolic blood pressure. Nocturnal dip: Appears normal  Device candidate? Yes - if uncontrolled >140  - PRIOR wider pulse pressure indicative of  arteriosclerosis - resolved  Plan:   Monitoring:   - continue home BP monitoring, reviewed correct technique:  Yes   - order 24h ABPM:  no  - monitor lytes/gfr routinely     2. Work up of Secondary Causes of Resistant Hypertension:   Renovascular HTN: Excluded  Primary Aldosteronism: Excluded, high renin  Thyroid Function: Excluded     CHOCO on CPAP  Strongly encouraged CPAP adherence to reduce RV strain, improve overall fatigue symptoms, and improve BP in both the short- and long-term as per HIPARCO (2013) and HIPARCO-2 (2021) studies.   - continue CPAP, defer mgmt to sleep MD    Pheochromocytoma: Not Yet Assessed  Other: none  Recommendations At This Visit:        3. Medication Use / Adherence:  Assessment: Complete  Recommendations: Instructed to Continue Taking All Medications As Prescribed         4. End Organ Damage:   Left Ventricular Hypertrophy: yes on  Echocardiogram Date: 2022  Albuminuria: Microalbuminuria, level A2 on Date: 2023  Renal Function: Chronic Kidney Disease  Stage 3a - stable   - avoid nephrotoxins  - continue HTN control with RAAS blockade   - monitor lytes/gfr routinely  - eval with nephrology if worsening over time    Established Cardiovascular Disease:    # CAD - s/p CABG, 12/2013, stable  still in high risk stage <10yrs from operation   - continue aggressive med mgmt, qualifies for pcsk9i  - ongoing care with cardiology    # Afib - hx of paroxysmal Afib post-op CABG, recurrent   Appears to be contemporaneous to the vascepa initiation  No current, has been off BB and xarelto in 2013.   - continue meds per cardiology  - off OAC, on antiplat only   - Continue monitoring per cardiology    5. Lifestyle Recommendations:    SMOKING:    reports that he quit smoking about 37 years ago. His smoking use included cigarettes. He started smoking about 62 years ago. He has a 50 pack-year smoking history. He has never used smokeless tobacco.   - continued complete avoidance of all tobacco products      PHYSICAL ACTIVITY: continue healthy activity to improve CV fitness.  In general, targeting >150min/week of moderate-level activity.      WEIGHT MANAGEMENT AND NUTRITION: Mediterranean style dietary approach       6. Standard HTN Pharmacotherapy:  - continue trandolapril 4mg QHS for true 24h coverage  Thiazide Type Diuretic: stop due to worsening ED  - continue amlodipine 5mg daily - aware on nonDHP-CCB too     7. Additional Agents:  - continue eplerenone 50mg BID, consider reduction to 25mg BID in future   - continue doxazosin to 2mg QHS 1 full tab, can then consider increase to 4mg QHS  - stopped diltiazem 180mg ER daily   - failed BB in past due to dizz, allyson     Lipid management:   Secondary prevention per 2018 ACC/AHA guidelines   -Myalgias on rosuva and 1/2 dose pravastatin. Has failed simva, atorva, prava.    -PSCK9i indicated per 2018 ACC/AHA guidelines in this patient with established ASCVD whose LDL-C remains above threshold of 70 mg/dl despite maximally tolerated statin therapy.  -Praluent tolerated, PA approved since 2020, excellent response to pcsk9i  -Stopped zetia as LDL at goal with pcsk9i alone  -persistent hypertrig despite vascepa - stopped vascepa due to intolerance and new onset Afib  - considering LDL-C = 1, we have opted to reduce praluent to 150mg monthly, alternative option is 75mg Q2wks - pt prefers the former   Tx goal: LDL-C <55 (apoB <70-80)   At goal:  yes, 11/2023  Plan:  - reinforced ongoing TLC measures   - labs Q6mo - monitor direct LDL-C due to low calc LDL-C and apoB due to TG >200   Meds:   - continue praluent 150mg monthly   - continue vitamin D3 0635-6038 daily     DM: type 2, non-insulin   Goal A1c <7.0%  Lab Results   Component Value Date    HBA1C 6.7 (H) 08/22/2023      Lab Results   Component Value Date/Time    MALBCRT 15 11/10/2023 10:00 AM    MICROALBUR 18.8 11/10/2023 10:00 AM    invokana too expensive   Does not seem to be greg Metformin more that once  "daily  Plan:  - metformin 500 mg Qpm due to SE unable to start ER form.     - continue acarbose   - holding Invokana 100 mg for now due to expense, consider restart if A1c elevates switch to Jardiance or Farxiga if cost is better  - consider GLP1RA weekly as 3rd agent   - recommmend for routine care with PCP (or endocrine) to include regular A1c monitoring, annual albumin/creatinine ratio (ACR), annual diabetic retinopathy screening, foot exams, annual flu vaccine, and updates to pneumonia vaccines as appropriate     ANTITHROMBOTIC THERAPY: continue ASA 81mg daily   Completed VKA after ablation - no further Aflut/fib     Other    # hypogonadism, erectile dysfunction, stable and improved off thiazide and spironolactone.  Pt aware of adverse effects of testosterone on lipids  - defer mgmt to PCP and/or urology/endocrine     # vertigo, episodic with hearing loss   - as reviewed with pt this could be multifactorial or contributed to a specific condition vs medication   - current pattern does not necessarily fit a med ADRs nor HTN related that I am able to discern.    - most recent echo normal, Afib and HR in stable range,  BP excursions are limited and ranges would not traditionally be implicated in dizziness causation   - we reviewed that the following meds could be problematic:    Causes of stomach uneasiness, nausea - metformin, doxycycline, acarbose   Dizziness - amlodipine, diltiazem, eplerenone  - at future visits could consider \"drug holiday\" for any of these meds in the future where we stop a med for 1-2 weeks but I recommend eval with PCP 1st further further evaluation of his sx      Studies Ordered At This Visit:  None   Blood Work to Be Obtained Prior to Next Visit:  as noted in assessment    Disposition: 6mo, sooner prn     Javad Childs M.D.   [facility]   [facility]   [phone]   "

## 2024-05-16 ENCOUNTER — TELEPHONE (OUTPATIENT)
Dept: PHARMACY | Facility: MEDICAL CENTER | Age: 79
End: 2024-05-16

## 2024-05-16 PROCEDURE — RXMED WILLOW AMBULATORY MEDICATION CHARGE

## 2024-05-16 NOTE — TELEPHONE ENCOUNTER
Contact:  Phone number:[phone] (home)    Name of person spoken with and relationship to patient: lesli Piña   Patient’s Adherence:  How patient is doing on medication: Well    How many missed doses and reason: 0 N/A    Any new medications: No    Any new conditions: No    Any new allergies: No    Any new side effects: No    Any new diagnoses: No    How many doses remainin    Did patient want to speak with pharmacist: No   Delivery:  Delivery date and method:  via [employer] (Ship out )    Needs by Date:     Signature required: No     Any additional details for : N/A   Teach Appointment Date:  N/A   Shipping Address:  [address] [place] [address]   [facility] [address]   Medication(name,strength and dose):  Praluent 150mg/mL SOAJ   Copay:  $0   Payment Method:  n/a $0 copay   Supplies:  ALL   Additional Information:  N/A     Thank you,  Alice Bowman  Pharmacy Liaison  [facility] and Vascular [facility]  [phone]

## 2024-05-16 NOTE — TELEPHONE ENCOUNTER
Medication: Praluent 150mg/mL SOAJ  Type of Insurance: Government funded (Medicare/Medicare Advantage)  Type of Financial assistance requested Foundation  Source: [facility]  Source Phone #: [phone]  Outcome: APPROVED!  Effective dates: 4/16/24 until 4/15/25  Details/Billing Information:   BIN:[id number]  PCN: [id number]  GRP: [id number]  ID: [id number]  Max Award Amount: $2,500  Final Copay: $0    Thank you,  Alice Bowman  Pharmacy Liaison  [facility] and Vascular [facility]  [phone]

## 2024-05-16 NOTE — TELEPHONE ENCOUNTER
Patient Phone Number: [phone]  Medication: Praluent 150mg/mL SOAJ (Quantity 6mL, Day Supply 84)  Copay: $250  Household size:1  Annual Household Adjusted Gross Income: $26,000  Additional information/consent to FA: Called and spoke with Wang at [phone]. Notified patient of their copay. Patient gave verbal consent to obtain FA through [employer]. Patient has Government sponsored insurance.    Thank you,  Alice Bowman  Pharmacy Liaison  [facility] and [facility]  [phone]

## 2024-05-16 NOTE — TELEPHONE ENCOUNTER
Received Refill request via  TRx   for Praluent 150mg/mL SOAJ. (Quantity:6mL, Day Supply:84)     Insurance: [employer]  Member ID:  [id number]  BIN: [id number]  PCN: [id number]  Group: [id number]     Ran Test claim via [place] & medication Pays for a $250 copay. Will outreach to patient to offer specialty pharmacy services and or release to preferred pharmacy    Thank you,  Alice Bowman  Pharmacy Liaison  [facility] and Vascular [facility]  [phone]

## 2024-05-20 ENCOUNTER — PHARMACY VISIT (OUTPATIENT)
Dept: PHARMACY | Facility: MEDICAL CENTER | Age: 79
End: 2024-05-20
Payer: COMMERCIAL

## 2024-07-10 DIAGNOSIS — I1A.0 RESISTANT HYPERTENSION: ICD-10-CM

## 2024-07-10 RX ORDER — SPIRONOLACTONE 25 MG/1
25 TABLET ORAL DAILY
Qty: 100 TABLET | Refills: 3 | Status: SHIPPED | OUTPATIENT
Start: 2024-07-10

## 2024-08-05 DIAGNOSIS — E78.5 DYSLIPIDEMIA: ICD-10-CM

## 2024-08-05 RX ORDER — ALIROCUMAB 150 MG/ML
INJECTION, SOLUTION SUBCUTANEOUS
Qty: 6 ML | Refills: 3 | Status: SHIPPED | OUTPATIENT
Start: 2024-08-05

## 2024-08-09 PROCEDURE — RXMED WILLOW AMBULATORY MEDICATION CHARGE: Performed by: NURSE PRACTITIONER

## 2024-08-12 ENCOUNTER — PHARMACY VISIT (OUTPATIENT)
Dept: PHARMACY | Facility: MEDICAL CENTER | Age: 79
End: 2024-08-12
Payer: COMMERCIAL

## 2024-08-21 ENCOUNTER — TELEPHONE (OUTPATIENT)
Dept: VASCULAR LAB | Facility: MEDICAL CENTER | Age: 79
End: 2024-08-21
Payer: COMMERCIAL

## 2024-08-21 NOTE — TELEPHONE ENCOUNTER
Established patient  Chart prep for upcoming appointment.    Any pending/incomplete orders from last visit? No, all orders completed.  Was patient called and reminded to complete pending orders? N/A orders complete  Were any records requested?  No    Referral up to date? Yes  Referral attached to appointment (renewals and New patients only)? N/A (established with up-to-date referral)  Virtual appointment? No

## 2024-08-22 ENCOUNTER — OFFICE VISIT (OUTPATIENT)
Dept: VASCULAR LAB | Facility: MEDICAL CENTER | Age: 79
End: 2024-08-22
Attending: FAMILY MEDICINE
Payer: COMMERCIAL

## 2024-08-22 VITALS
DIASTOLIC BLOOD PRESSURE: 72 MMHG | BODY MASS INDEX: 33.43 KG/M2 | WEIGHT: 213 LBS | HEIGHT: 67 IN | SYSTOLIC BLOOD PRESSURE: 132 MMHG | HEART RATE: 64 BPM

## 2024-08-22 DIAGNOSIS — I25.10 CORONARY ARTERY DISEASE INVOLVING NATIVE HEART WITHOUT ANGINA PECTORIS, UNSPECIFIED VESSEL OR LESION TYPE: ICD-10-CM

## 2024-08-22 DIAGNOSIS — E88.810 METABOLIC SYNDROME: ICD-10-CM

## 2024-08-22 DIAGNOSIS — I51.7 MILD CONCENTRIC LEFT VENTRICULAR HYPERTROPHY (LVH): ICD-10-CM

## 2024-08-22 DIAGNOSIS — E78.2 MIXED HYPERLIPIDEMIA: Chronic | ICD-10-CM

## 2024-08-22 DIAGNOSIS — G47.33 OSA TREATED WITH BIPAP: ICD-10-CM

## 2024-08-22 DIAGNOSIS — Z79.02 LONG TERM (CURRENT) USE OF ANTITHROMBOTICS/ANTIPLATELETS: ICD-10-CM

## 2024-08-22 DIAGNOSIS — Z95.1 HX OF CABG: ICD-10-CM

## 2024-08-22 DIAGNOSIS — D68.69 SECONDARY HYPERCOAGULABLE STATE (HCC): ICD-10-CM

## 2024-08-22 DIAGNOSIS — E11.59 TYPE 2 DIABETES MELLITUS WITH OTHER CIRCULATORY COMPLICATION, WITHOUT LONG-TERM CURRENT USE OF INSULIN (HCC): Chronic | ICD-10-CM

## 2024-08-22 DIAGNOSIS — Z78.9 STATIN INTOLERANCE: Chronic | ICD-10-CM

## 2024-08-22 DIAGNOSIS — I48.0 PAROXYSMAL ATRIAL FIBRILLATION (HCC): ICD-10-CM

## 2024-08-22 DIAGNOSIS — I1A.0 RESISTANT HYPERTENSION: Chronic | ICD-10-CM

## 2024-08-22 PROCEDURE — 99214 OFFICE O/P EST MOD 30 MIN: CPT | Performed by: FAMILY MEDICINE

## 2024-08-22 PROCEDURE — 99212 OFFICE O/P EST SF 10 MIN: CPT

## 2024-08-22 PROCEDURE — 3075F SYST BP GE 130 - 139MM HG: CPT | Performed by: FAMILY MEDICINE

## 2024-08-22 PROCEDURE — 3078F DIAST BP <80 MM HG: CPT | Performed by: FAMILY MEDICINE

## 2024-08-22 ASSESSMENT — ENCOUNTER SYMPTOMS
FEVER: 0
BRUISES/BLEEDS EASILY: 0
CHILLS: 0
WEAKNESS: 0
COUGH: 0
ORTHOPNEA: 0
PALPITATIONS: 0
MYALGIAS: 0
CLAUDICATION: 0
NAUSEA: 0

## 2024-08-22 ASSESSMENT — FIBROSIS 4 INDEX: FIB4 SCORE: 1.54

## 2024-08-22 NOTE — PROGRESS NOTES
RESISTANT HTN CLINIC - Follow-up   24      Wang Forman Jr. is a male seen for evaluation of resistant HTN and management.   Wang Forman Jr. is referred by:cardiologist Scott Cameron MD, est 2019    Subjective   Interval hx/concerns: last seen 2024, had labs.  Feeling well. No med or health status changes.  Labs reported LDL-C = (-) 4 mg/dl and apoB 50.       HTN: Home BP lo-130s/70-80s, tolerating all meds with good adherence     AF, s/p ablation: stable, no recurrence.  Now off diltiazem and VKA.  No sx     Antiplatelet/anticoagulation: stopped VKA, continued ASA 81mg daily   HLD:  stable, using praluent 150mg [id number]    T2D: No changes, tolerating meds.       Current Outpatient Medications on File Prior to Visit   Medication Sig Dispense Refill    Alirocumab (PRALUENT) 150 MG/ML Solution Auto-injector INJECT 1 SYRINGE  SUBCUTANEOUSLY EVERY 2  WEEKS 6 mL 3    spironolactone (ALDACTONE) 25 MG Tab Take 1 tablet by mouth once daily 100 Tablet 3    oxybutynin SR (DITROPAN-XL) 5 MG TABLET SR 24 HR Take 1 tablet by mouth once daily 90 Tablet 0    aspirin 81 MG EC tablet Take 81 mg by mouth every day.      Probiotic Product (PROBIOTIC BLEND PO) Take  by mouth. Sleep gummies (Relaxium)      multivitamin Tab Take 1 Tablet by mouth every day. Ceballos Mature      nitroglycerin (NITROSTAT) 0.4 MG SL Tab Sublingual onset of symptoms 25 Tablet 1    pantoprazole (PROTONIX) 40 MG Tablet Delayed Response TAKE 1 TABLET BY MOUTH ONCE  DAILY 90 Tablet 3    chlorproMAZINE (THORAZINE) 25 MG Tab Take 1 Tablet by mouth 2 times a day as needed (hiccups). 60 Tablet 1    amLODIPine (NORVASC) 5 MG Tab TAKE 1 TABLET BY MOUTH AT BEDTIME 90 Tablet 3    metFORMIN (GLUCOPHAGE) 500 MG Tab TAKE 1 TABLET BY MOUTH ONCE  DAILY 90 Tablet 3    trandolapril (MAVIK) 4 MG Tab TAKE 1 TABLET BY MOUTH ONCE DAILY IN THE EVENING 90 Tablet 3    acarbose (PRECOSE) 50 MG Tab Take 1 tablet by mouth twice daily 180  Tablet 0    doxazosin (CARDURA) 2 MG Tab TAKE 1 TABLET BY MOUTH ONCE DAILY FOR BLOOD PRESSURE  AND FOR PROSTATE 90 Tablet 3    promethazine-dextromethorphan (PROMETHAZINE-DM) 6.25-15 MG/5ML syrup Take 5 mL by mouth every four hours as needed for Cough. 140 mL 0    chlorhexidine (PERIDEX) 0.12 % Solution       acetaminophen (TYLENOL) 500 MG Tab Take 500-1,000 mg by mouth every 6 hours as needed.      Acetaminophen-Codeine 300-30 MG Tab       doxycycline (MONODOX) 50 MG capsule Take 50 mg by mouth.      cholecalciferol (VITAMIN D3) 400 UNIT Tab Take 2,000 Units by mouth every day.       Current Facility-Administered Medications on File Prior to Visit   Medication Dose Route Frequency Provider Last Rate Last Admin    testosterone cypionate (DEPO-TESTOSTERONE) injection 200 mg  200 mg Intramuscular Q21 DAYS Natanael Sin M.D.   200 mg at 24 0938      Social History     Tobacco Use    Smoking status: Former     Current packs/day: 0.00     Average packs/day: 2.0 packs/day for 25.0 years (50.0 ttl pk-yrs)     Types: Cigarettes     Start date: 1962     Quit date: 1987     Years since quittin.6    Smokeless tobacco: Never   Vaping Use    Vaping status: Never Used   Substance Use Topics    Alcohol use: Not Currently     Alcohol/week: 0.0 oz     Comment: a few times a year    Drug use: No      Review of Systems   Constitutional:  Negative for chills and fever.   Respiratory:  Negative for cough.    Cardiovascular:  Negative for chest pain, palpitations, orthopnea, claudication and leg swelling.   Gastrointestinal:  Negative for nausea.   Musculoskeletal:  Negative for myalgias.   Neurological:  Negative for weakness.   Endo/Heme/Allergies:  Does not bruise/bleed easily.      Objective   Vitals:    24 1320 24 1324   BP: (!) 145/68 132/72   BP Location: Left arm Left arm   Patient Position: Sitting Sitting   BP Cuff Size: Large adult Large adult   Pulse: 61 64   Weight: 96.6 kg (213 lb)    Height:  "1.689 m (5' 6.5\")      BP Readings from Last 5 Encounters:   08/22/24 132/72   03/29/24 118/60   03/11/24 (!) 140/72   02/20/24 137/68   12/24/23 112/56      Body mass index is 33.86 kg/m².  Physical Exam  Constitutional:       General: He is not in acute distress.     Appearance: He is well-developed. He is not diaphoretic.   Neck:      Vascular: No JVD.   Cardiovascular:      Rate and Rhythm: Normal rate and regular rhythm.      Heart sounds: Normal heart sounds. No murmur heard.     No friction rub. No gallop.   Pulmonary:      Effort: Pulmonary effort is normal. No respiratory distress.      Breath sounds: Normal breath sounds. No wheezing or rales.   Musculoskeletal:         General: No tenderness.   Skin:     General: Skin is warm and dry.      Coloration: Skin is not pale.      Findings: No erythema or rash.   Neurological:      Mental Status: He is alert and oriented to person, place, and time.      Coordination: Coordination normal.   Psychiatric:         Behavior: Behavior normal.        Accessory Clinical Findings:      Lab Results   Component Value Date    CHOLSTRLTOT 95 (L) 08/13/2024    LDL -4 (L) 08/13/2024    HDL 32.0 (L) 08/13/2024    TRIGLYCERIDE 335 (H) 08/13/2024           No results found for: \"LIPOPROTA\"   Lab Results   Component Value Date/Time    APOB 50 08/13/2024 07:59 AM      Lab Results   Component Value Date/Time    CRPHIGHSEN 1.4 08/13/2024 07:59 AM       Lab Results   Component Value Date    HBA1C 7.1 (H) 08/13/2024      Lab Results   Component Value Date    SODIUM 137 07/03/2024    POTASSIUM 4.4 07/03/2024    CHLORIDE 102 07/03/2024    CO2 25 07/03/2024    GLUCOSE 173 (H) 07/03/2024    BUN 14 07/03/2024    CREATININE 1.2 07/03/2024    GLOMRATE 61 10/18/2023        Lab Results   Component Value Date    URCREAT 132 08/13/2024    MICROALBUR 33.6 (H) 08/13/2024    MALBCRT 25 08/13/2024     VASCULAR IMAGING    Echo 2017  No prior study is available for comparison.   Left ventricular " ejection fraction is visually estimated to be 60%.   Normal diastolic function. Poor endocardial definition difficult to   assess wall motion.  No evidence of valvular abnormality based on Doppler evaluation.   Estimated right ventricular systolic pressure  is 25 mmHg.    Echo 7/25/19  Normal left ventricular systolic function. Left ventricular ejection   fraction is visually estimated to be 55%.   Normal diastolic function.    Renal art duplex 7/31/19  Unremarkable renal sonogram with no evidence of renal artery stenosis as demonstrated above    Echo 10/2022  There is mild concentric left ventricular hypertrophy. Left ventricular systolic function is normal.   The Ejection Fraction estimate is 55-60%   There is moderate mitral regurgitation   There is mild to moderate aortic stenosis   Right ventricular systolic pressure is elevated at 35-40 mmHg       ASSESSMENT / PLAN:  1. Resistant hypertension        2. Mixed hyperlipidemia        3. Type 2 diabetes mellitus with other circulatory complication, without long-term current use of insulin (HCC)        4. Paroxysmal atrial fibrillation (HCC)        5. Secondary hypercoagulable state (HCC)        6. Coronary artery disease involving native heart without angina pectoris, unspecified vessel or lesion type        7. Hx of CABG        8. Mild concentric left ventricular hypertrophy (LVH)        9. CHOCO treated with BiPAP        10. Long term (current) use of antithrombotics/antiplatelets        11. Metabolic syndrome        12. Statin intolerance           Blood Pressure Control:  Qualifies for resistant HTN due to 4 meds w/o control   ACC/AHA (2017) goal <130/80, will consider <140 reasonable   Home BP at goal:  yes  Office BP at goal: yes, white coat effect noted   24h ABPM (12/2019): Mean daytime: 146/75 consistent with poorly controlled out of office  systolic blood pressure. Nocturnal dip: Appears normal  Device candidate? Yes - if uncontrolled >140  - PRIOR wider  pulse pressure indicative of arteriosclerosis - resolved  Plan:   - continue home BP monitoring, reviewed correct technique:  Yes   - monitor lytes/gfr routinely     Adherence:  Assessment: Complete  Recommendations: Instructed to Continue Taking All Medications As Prescribed    Work up of Secondary Causes of Resistant Hypertension:   Renovascular HTN: Excluded  Primary Aldosteronism: Excluded, high renin  Thyroid Function: Excluded     CHOCO on CPAP  Strongly encouraged CPAP adherence to reduce RV strain, improve overall fatigue symptoms, and improve BP in both the short- and long-term as per HIPARCO (2013) and HIPARCO-2 (2021) studies.   - continue CPAP, defer mgmt to sleep MD    Pheochromocytoma: Not Yet Assessed  Other: none         End Organ Damage:   Left Ventricular Hypertrophy: yes on  Echocardiogram Date: 2022  Albuminuria: Microalbuminuria, level A2 on Date: 2023  Renal Function: Chronic Kidney Disease  Stage 3a - stable     - avoid nephrotoxins  - continue HTN control with RAAS blockade   - monitor lytes/gfr routinely  - eval with nephrology if worsening over time    Established Cardiovascular Disease:    # CAD - s/p CABG, 12/2013, stable  still in high risk stage <10yrs from operation   Plan:  - continue aggressive med mgmt, qualifies for pcsk9i  - ongoing care with cardiology    # AF/AFL - hx of paroxysmal Afib post-op CABG, recurrent   Appears to be contemporaneous to the vascepa initiation  No current, has been off BB and xarelto in 2013.   Plan:  - continue meds per cardiology  - off OAC, on antiplat only   - Continue monitoring per cardiology    LIFESTYLE INTERVENTIONS  TOBACCO: Stages of Change: Maintenance (sustained change >6mo)    reports that he quit smoking about 37 years ago. His smoking use included cigarettes. He started smoking about 62 years ago. He has a 50 pack-year smoking history. He has never used smokeless tobacco.   - continued complete avoidance of all tobacco products   PHYSICAL  ACTIVITY: >150min/week of mod-intensity activity or as much as tolerated  NUTRITION: DASH  and reduce Na to 1500mg/day   ETOH: limit to 1 or less standard drinks/day  WT MGMT: maintain healthy weight      Standard HTN Pharmacotherapy:  - continue trandolapril 4mg QHS for true 24h coverage  Thiazide Type Diuretic: stop due to worsening ED  - continue amlodipine 5mg daily - aware on nonDHP-CCB too     Additional Agents:  - continue eplerenone 50mg BID, consider reduction to 25mg BID in future   - continue doxazosin to 2mg QHS 1 full tab, can then consider increase to 4mg QHS  - stopped diltiazem 180mg ER daily   - failed BB in past due to dizz, allyson     Lipid management:   Secondary prevention per 2018 ACC/AHA guidelines   -Myalgias on rosuva and 1/2 dose pravastatin. Has failed simva, atorva, prava.    - considering LDL-C = 1 and now (-) 4 mg/dl, we will reduce praluent further   Tx goal: apoB <60,  LDL-C <55   At goal:  yes, 8/2024   - as reviewed with pt, the negative LDL-C is based upon calculations only and not reflective of true LDL-C since calc is inaccurate at low LDL-C, so we will continue to use ApoB and direct LDL-C as targets   Plan:  - reinforced ongoing TLC measures   - labs Q6mo   Meds:   - no further statin rechallenges due to complete intolerance   - Stopped zetia as LDL at goal with pcsk9i alone  - continue praluent 150mg Q2wks, he could probably due extended Q3-4wk if he wishs - tolerated, PA approved since 2020, excellent response to pcsk9i  - continue vitamin D3 8188-6990 daily   - stopped vascepa due to intolerance and new onset Afib    -PSCK9i indicated per 2018 ACC/AHA guidelines in this patient with established ASCVD whose LDL-C remains above threshold of 70 mg/dl despite maximally tolerated statin therapy.    DM: type 2 with HLD, ASCVD    Goal A1c <7.0%  Lab Results   Component Value Date    HBA1C 7.1 (H) 08/13/2024      Lab Results   Component Value Date/Time    MALBCRT 25 08/13/2024 07:59  "AM    MICROALBUR 33.6 (H) 08/13/2024 07:59 AM   - invokana too expensive   - Does not seem to be greg Metformin more that once daily  Plan:  - metformin 500 mg Qpm due to SEs unable to start ER form.     - continue acarbose   - holding Invokana 100 mg for now due to expense, consider restart if A1c elevates switch to Jardiance or Farxiga if cost is better  - consider GLP1RA weekly as 3rd agent   - recommmend for routine care with PCP (or endocrine) to include regular A1c monitoring, annual albumin/creatinine ratio (ACR), annual diabetic retinopathy screening, foot exams, annual flu vaccine, and updates to pneumonia vaccines as appropriate     ANTITHROMBOTIC THERAPY: continue ASA 81mg daily   Completed VKA after ablation - no further Aflut/fib     OTHER:    # hypogonadism, erectile dysfunction, stable and improved off thiazide and spironolactone.  Pt aware of adverse effects of testosterone on lipids  - defer mgmt to PCP and/or urology/endocrine     # vertigo, episodic with hearing loss   - as reviewed with pt this could be multifactorial or contributed to a specific condition vs medication   - current pattern does not necessarily fit a med ADRs nor HTN related that I am able to discern.    - most recent echo normal, Afib and HR in stable range,  BP excursions are limited and ranges would not traditionally be implicated in dizziness causation   - we reviewed that the following meds could be problematic:    Causes of stomach uneasiness, nausea - metformin, doxycycline, acarbose   Dizziness - amlodipine, diltiazem, eplerenone  - at future visits could consider \"drug holiday\" for any of these meds in the future where we stop a med for 1-2 weeks but I recommend eval with PCP 1st further further evaluation of his sx      STUDIES: none   LABS: as noted above  Follow up in: RTC in 6 months     Javad Childs M.D.   [facility]   [facility]   [phone]   "

## 2024-09-23 DIAGNOSIS — I1A.0 RESISTANT HYPERTENSION: ICD-10-CM

## 2024-09-23 RX ORDER — TRANDOLAPRIL TABLETS 4 MG/1
TABLET ORAL
Qty: 90 TABLET | Refills: 0 | OUTPATIENT
Start: 2024-09-23

## 2024-10-28 DIAGNOSIS — I1A.0 RESISTANT HYPERTENSION: ICD-10-CM

## 2024-10-29 RX ORDER — AMLODIPINE BESYLATE 5 MG/1
5 TABLET ORAL
Qty: 100 TABLET | Refills: 3 | Status: SHIPPED | OUTPATIENT
Start: 2024-10-29

## 2024-11-22 PROCEDURE — RXMED WILLOW AMBULATORY MEDICATION CHARGE: Performed by: NURSE PRACTITIONER

## 2024-11-25 ENCOUNTER — PHARMACY VISIT (OUTPATIENT)
Dept: PHARMACY | Facility: MEDICAL CENTER | Age: 79
End: 2024-11-25
Payer: COMMERCIAL

## 2024-12-18 ENCOUNTER — TELEPHONE (OUTPATIENT)
Dept: PHARMACY | Facility: MEDICAL CENTER | Age: 79
End: 2024-12-18
Payer: COMMERCIAL

## 2024-12-18 DIAGNOSIS — E78.2 MIXED HYPERLIPIDEMIA: Chronic | ICD-10-CM

## 2024-12-18 DIAGNOSIS — Z95.1 HX OF CABG: ICD-10-CM

## 2024-12-18 DIAGNOSIS — Z78.9 STATIN INTOLERANCE: Chronic | ICD-10-CM

## 2024-12-18 NOTE — PROGRESS NOTES
Insurance formulary change from Praluent to Repatha.  New order sent for Repatha.    Xochitl RANDOLPH  [facility]

## 2024-12-18 NOTE — TELEPHONE ENCOUNTER
Okay to be seen by home care physical therapy.  2 visits weekly times 3 weeks.   Prior Authorization for Praluent INJ 75 MG/ML  has been denied for a quantity of 2ml , day supply 28    Prior authorization was denied per the following: Plan wants Repatha tried and failed before considering Praluent    Prior Authorization denial reference number: [id number]  Insurance: [employer]      Next Steps: PA denied plan wants Repatha tried and failed before considering Praluent. Will message liaison to inform provider.

## 2025-01-08 ENCOUNTER — TELEPHONE (OUTPATIENT)
Dept: VASCULAR LAB | Facility: MEDICAL CENTER | Age: 80
End: 2025-01-08
Payer: COMMERCIAL

## 2025-01-08 NOTE — TELEPHONE ENCOUNTER
Prior Authorization for Repatha Sureclick 140mg/mL SOAJ (Quantity: 6mL, Days: 84) has been submitted via Cover My Meds: Key ([id number])    Insurance: MEDIC[employer]    Will follow up in 24-48 business hours.     Alice Bowman  Pharmacy Liaison  [facility] and [facility]ascular [facility]  [phone]  1/8/2025 3:39 PM

## 2025-01-08 NOTE — TELEPHONE ENCOUNTER
Received New Start PA request via [employer]  for Repatha Sureclick 140mg/mL SOAJ. (Quantity:6mL, Day Supply:84)     Insurance: Medicare [employer]  Member ID:  [id number]  BIN: [id number]  PCN: [id number]  Group: [id number]     Ran Test claim via [place] & medication Rejects stating prior authorization is required.    Alice Bowman  Pharmacy Liaison  [facility] and Vascular [facility]  [phone]  1/8/2025 3:19 PM

## 2025-01-09 NOTE — TELEPHONE ENCOUNTER
Appeal faxed 1/8/25 @ 5pm.     Alice Bowman  Pharmacy Liaison  [facility]  [phone]  1/9/2025 10:14 AM

## 2025-01-09 NOTE — TELEPHONE ENCOUNTER
Prior Authorization for Repatha Sureclick 140mg/mL SOAJ has been denied for a quantity of 6mL , day supply 84    Prior authorization was denied per the following: Pt has not been receiving Zetia therapy as adjunct to maximally tolerated Statin therapy, or having a contraindication or intolerance to zetia.     Prior Authorization denial reference number: [id number]    Insurance: Medicare [employer]      Next Steps: Proceed with appeal process. Generate proposed appeal for provider approval & signature.     Alice Bowman  Pharmacy Liaison  [facility] and Vascular [facility]  [phone]  1/8/2025 4:43 PM

## 2025-01-22 NOTE — TELEPHONE ENCOUNTER
Ran test claim to see about Appeal status. Medication is approved now. Plan did not fax over any approval details, including reference number and how long the PA is good for on this medication.    Will call pt's plan to confirm these details and document in a different note.    Alice Bowman  Pharmacy Liaison  [facility] and Vascular [facility]  [phone]  1/22/2025 12:28 PM     normal...

## 2025-02-10 ENCOUNTER — OFFICE VISIT (OUTPATIENT)
Dept: VASCULAR LAB | Facility: MEDICAL CENTER | Age: 80
End: 2025-02-10
Attending: FAMILY MEDICINE
Payer: COMMERCIAL

## 2025-02-10 VITALS
HEIGHT: 67 IN | WEIGHT: 205 LBS | DIASTOLIC BLOOD PRESSURE: 71 MMHG | BODY MASS INDEX: 32.18 KG/M2 | HEART RATE: 62 BPM | SYSTOLIC BLOOD PRESSURE: 131 MMHG

## 2025-02-10 DIAGNOSIS — E88.810 METABOLIC SYNDROME: ICD-10-CM

## 2025-02-10 DIAGNOSIS — D68.69 SECONDARY HYPERCOAGULABLE STATE (HCC): ICD-10-CM

## 2025-02-10 DIAGNOSIS — Z95.1 HX OF CABG: Chronic | ICD-10-CM

## 2025-02-10 DIAGNOSIS — Z86.79 S/P ABLATION OF ATRIAL FIBRILLATION: ICD-10-CM

## 2025-02-10 DIAGNOSIS — I25.10 ATHEROSCLEROSIS OF NATIVE CORONARY ARTERY OF NATIVE HEART WITHOUT ANGINA PECTORIS: Chronic | ICD-10-CM

## 2025-02-10 DIAGNOSIS — Z79.02 LONG TERM (CURRENT) USE OF ANTITHROMBOTICS/ANTIPLATELETS: ICD-10-CM

## 2025-02-10 DIAGNOSIS — Z78.9 STATIN INTOLERANCE: ICD-10-CM

## 2025-02-10 DIAGNOSIS — G47.33 OSA TREATED WITH BIPAP: ICD-10-CM

## 2025-02-10 DIAGNOSIS — I48.0 PAROXYSMAL ATRIAL FIBRILLATION (HCC): ICD-10-CM

## 2025-02-10 DIAGNOSIS — I65.23 CAROTID ATHEROSCLEROSIS, BILATERAL: ICD-10-CM

## 2025-02-10 DIAGNOSIS — E78.2 MIXED HYPERLIPIDEMIA: ICD-10-CM

## 2025-02-10 DIAGNOSIS — Z98.890 S/P ABLATION OF ATRIAL FIBRILLATION: ICD-10-CM

## 2025-02-10 DIAGNOSIS — I51.7 MILD CONCENTRIC LEFT VENTRICULAR HYPERTROPHY (LVH): ICD-10-CM

## 2025-02-10 DIAGNOSIS — E11.59 TYPE 2 DIABETES MELLITUS WITH OTHER CIRCULATORY COMPLICATION, WITHOUT LONG-TERM CURRENT USE OF INSULIN (HCC): ICD-10-CM

## 2025-02-10 DIAGNOSIS — I10 PRIMARY HYPERTENSION: ICD-10-CM

## 2025-02-10 PROCEDURE — 99212 OFFICE O/P EST SF 10 MIN: CPT

## 2025-02-10 PROCEDURE — 3075F SYST BP GE 130 - 139MM HG: CPT | Performed by: FAMILY MEDICINE

## 2025-02-10 PROCEDURE — 99214 OFFICE O/P EST MOD 30 MIN: CPT | Performed by: FAMILY MEDICINE

## 2025-02-10 PROCEDURE — 3078F DIAST BP <80 MM HG: CPT | Performed by: FAMILY MEDICINE

## 2025-02-10 PROCEDURE — G2211 COMPLEX E/M VISIT ADD ON: HCPCS | Performed by: FAMILY MEDICINE

## 2025-02-10 ASSESSMENT — ENCOUNTER SYMPTOMS
NAUSEA: 0
CLAUDICATION: 0
FEVER: 0
CHILLS: 0
BRUISES/BLEEDS EASILY: 0
COUGH: 0
MYALGIAS: 0
WEAKNESS: 0
PALPITATIONS: 0
ORTHOPNEA: 0

## 2025-02-10 ASSESSMENT — FIBROSIS 4 INDEX: FIB4 SCORE: 2.3

## 2025-02-10 NOTE — PROGRESS NOTES
RESISTANT HTN CLINIC - Follow-up   02/10/25    Wang Forman Jr. is a male seen for evaluation of resistant HTN and management.   Wang Forman Jr. is referred by:cardiologist Scott Cameron MD, est 6/2019    Subjective   Interval hx/concerns: last seen 8/2024, feeling well.  No changes, had labs   apoB 48, direct LDL 34  Using Repatha and feeling well.     PREOPERATIVE CLEARANCE EVAL:  Requested: Dr. Lindsey   Surgery type: TKA   Date of surgery: pending   Prior anesthesia or perioperative MACE: no  Activity limitations: yes - only due to knee pain, o/w stable   Functional METs status: reports able to  climbe >2 flights of stairs w/o RUST, CP.   No claudication/atypical leg pain.   DASI = 7 METS equivalence   Hx of major ASCVD event: History of prior MI >12 months ago  (2013), postop AF (resolved)  Hx of VTE: no  Prior pertinent CV testing: yes - 2022 echo, 2023 EKG, 2023 event monitor all wnl      HTN: Home BP log: no change = 120-130s/70-80s, tolerating all meds with good adherence.  Known WCE     AF, s/p ablation: stable, no recurrence.  Has been off dilat, VKA for >1yr.  No current sx    HLD:  stable, using praluent 150mg [id number]   T2D: No changes, tolerating meds.  No major changes      Antithrombotic tx: stopped VKA, continued ASA 81mg daily     Current Outpatient Medications on File Prior to Visit   Medication Sig Dispense Refill    Evolocumab (REPATHA) 140 MG/ML Solution Auto-injector SubQ injection pen Inject 1 mL under the skin every 14 days. 6 Each 3    oxybutynin SR (DITROPAN-XL) 10 MG CR tablet Take 1 Tablet by mouth every day. 90 Tablet 1    metFORMIN (GLUCOPHAGE) 500 MG Tab Take 1 tablet by mouth once daily 90 Tablet 3    amLODIPine (NORVASC) 5 MG Tab TAKE 1 TABLET BY MOUTH AT BEDTIME 100 Tablet 3    oxyCODONE immediate-release (ROXICODONE) 5 MG Tab Take 5 mg by mouth every 12 hours.      trandolapril (MAVIK) 4 MG Tab Take 1 Tablet by mouth every evening. 90 Tablet 0     pantoprazole (PROTONIX) 40 MG Tablet Delayed Response TAKE 1 TABLET BY MOUTH ONCE DAILY 90 Tablet 1    fluticasone (FLONASE) 50 MCG/ACT nasal spray Administer 1 Spray into affected nostril(S) every day. 16 g 0    spironolactone (ALDACTONE) 25 MG Tab Take 1 tablet by mouth once daily 100 Tablet 3    aspirin 81 MG EC tablet Take 81 mg by mouth every day.      Probiotic Product (PROBIOTIC BLEND PO) Take  by mouth. Sleep gummies (Relaxium)      multivitamin Tab Take 1 Tablet by mouth every day. Ceballos Mature      nitroglycerin (NITROSTAT) 0.4 MG SL Tab Sublingual onset of symptoms 25 Tablet 1    chlorproMAZINE (THORAZINE) 25 MG Tab Take 1 Tablet by mouth 2 times a day as needed (hiccups). 60 Tablet 1    acarbose (PRECOSE) 50 MG Tab Take 1 tablet by mouth twice daily 180 Tablet 0    doxazosin (CARDURA) 2 MG Tab TAKE 1 TABLET BY MOUTH ONCE DAILY FOR BLOOD PRESSURE  AND FOR PROSTATE 90 Tablet 3    promethazine-dextromethorphan (PROMETHAZINE-DM) 6.25-15 MG/5ML syrup Take 5 mL by mouth every four hours as needed for Cough. 140 mL 0    chlorhexidine (PERIDEX) 0.12 % Solution       acetaminophen (TYLENOL) 500 MG Tab Take 500-1,000 mg by mouth every 6 hours as needed.      doxycycline (MONODOX) 50 MG capsule Take 50 mg by mouth.      cholecalciferol (VITAMIN D3) 400 UNIT Tab Take 2,000 Units by mouth every day.       Current Facility-Administered Medications on File Prior to Visit   Medication Dose Route Frequency Provider Last Rate Last Admin    testosterone cypionate (DEPO-TESTOSTERONE) injection 200 mg  200 mg Intramuscular Q21 DAYS Natanael Sin M.D.   200 mg at 02/10/25 0917      Allergies   Allergen Reactions    Naproxen Anaphylaxis    Nsaids Anaphylaxis    Sulfa Drugs Nausea and Unspecified     Other reaction(s): nausea  Other reaction(s): Unknown    Atenolol Unspecified    Codeine      Nausea     Hydrochlorothiazide Unspecified    Morphine Nausea    Vascepa [Epa Ethyl Sherice]      Atrial fib      Hmg-Coa-R Inhibitors  "Myalgia     Failed crestor, prava, simva, atorva - myalgias     Rosuvastatin Calcium Myalgia      Social History     Tobacco Use    Smoking status: Former     Current packs/day: 0.00     Average packs/day: 2.0 packs/day for 25.0 years (50.0 ttl pk-yrs)     Types: Cigarettes     Start date: 1962     Quit date: 1987     Years since quittin.1    Smokeless tobacco: Never   Vaping Use    Vaping status: Never Used   Substance Use Topics    Alcohol use: Not Currently     Alcohol/week: 0.0 oz     Comment: a few times a year    Drug use: No      Review of Systems   Constitutional:  Negative for chills and fever.   Respiratory:  Negative for cough.    Cardiovascular:  Negative for chest pain, palpitations, orthopnea, claudication and leg swelling.   Gastrointestinal:  Negative for nausea.   Musculoskeletal:  Negative for myalgias.   Neurological:  Negative for weakness.   Endo/Heme/Allergies:  Does not bruise/bleed easily.      Objective   Vitals:    02/10/25 1440 02/10/25 1443   BP: (!) 159/75 131/71   BP Location: Left arm Left arm   Patient Position: Sitting Sitting   BP Cuff Size: Large adult Large adult   Pulse: 60 62   Weight: 93 kg (205 lb)    Height: 1.689 m (5' 6.5\")      BP Readings from Last 5 Encounters:   02/10/25 131/71   25 132/70   24 126/65   24 132/68   24 (!) 145/54      Body mass index is 32.59 kg/m².  Wt Readings from Last 3 Encounters:   02/10/25 93 kg (205 lb)   25 93 kg (205 lb)   24 94.3 kg (208 lb)      Physical Exam  Constitutional:       General: He is not in acute distress.     Appearance: He is well-developed. He is not diaphoretic.   Neck:      Vascular: No JVD.   Cardiovascular:      Rate and Rhythm: Normal rate and regular rhythm.      Heart sounds: Normal heart sounds. No murmur heard.     No friction rub. No gallop.   Pulmonary:      Effort: Pulmonary effort is normal. No respiratory distress.      Breath sounds: Normal breath sounds. No " "wheezing or rales.   Musculoskeletal:         General: No tenderness.   Skin:     General: Skin is warm and dry.      Coloration: Skin is not pale.      Findings: No erythema or rash.   Neurological:      Mental Status: He is alert and oriented to person, place, and time.      Coordination: Coordination normal.   Psychiatric:         Behavior: Behavior normal.        Accessory Clinical Findings:      Lab Results   Component Value Date    CHOLSTRLTOT 103 (L) 12/27/2024    LDL 1 12/27/2024    HDL 39.0 (L) 12/27/2024    TRIGLYCERIDE 314 (H) 12/27/2024     Lab Results   Component Value Date/Time    LDL 1 12/27/2024 07:33 AM    LDL -4 (L) 08/13/2024 07:59 AM    LDL 35 03/15/2024 07:02 AM    LDL 44 11/10/2023 07:30 AM    LDL 1 08/22/2023 07:48 AM       Latest Reference Range & Units 12/27/24 07:33   LDL Direct <100 mg/dL 34      No results found for: \"LIPOPROTA\"   Lab Results   Component Value Date/Time    APOB 48 12/27/2024 07:33 AM    APOB 50 08/13/2024 07:59 AM      Lab Results   Component Value Date/Time    CRPHIGHSEN 1.4 08/13/2024 07:59 AM     Lab Results   Component Value Date/Time    HBA1C 7.8 (A) 12/18/2024 03:28 PM    HBA1C 7.1 (H) 08/13/2024 07:59 AM    HBA1C 6.7 (H) 08/22/2023 07:48 AM       Lab Results   Component Value Date/Time    MALBCRT 41 (H) 12/27/2024 07:40 AM    MICROALBUR 74.2 (H) 12/27/2024 07:40 AM      Lab Results   Component Value Date    SODIUM 138 12/27/2024    POTASSIUM 4.4 12/27/2024    CHLORIDE 104 12/27/2024    CO2 25 12/27/2024    GLUCOSE 175 (H) 12/27/2024    BUN 13 12/27/2024    CREATININE 1.2 12/27/2024    GLOMRATE 61 10/18/2023     Lab Results   Component Value Date/Time    ALKPHOSPHAT 87 12/27/2024 07:33 AM    ASTSGOT 31 12/27/2024 07:33 AM    ALTSGPT 53 12/27/2024 07:33 AM    TBILIRUBIN 0.7 12/27/2024 07:33 AM      IMAGING    Echo 2017  No prior study is available for comparison.   Left ventricular ejection fraction is visually estimated to be 60%.   Normal diastolic function. Poor " endocardial definition difficult to   assess wall motion.  No evidence of valvular abnormality based on Doppler evaluation.   Estimated right ventricular systolic pressure  is 25 mmHg.    Echo 7/25/19  Normal left ventricular systolic function. Left ventricular ejection   fraction is visually estimated to be 55%.   Normal diastolic function.    Renal art duplex 7/31/19  Unremarkable renal sonogram with no evidence of renal artery stenosis as demonstrated above    Echo 10/2022  There is mild concentric left ventricular hypertrophy. Left ventricular systolic function is normal.   The Ejection Fraction estimate is 55-60%   There is moderate mitral regurgitation   There is mild to moderate aortic stenosis   Right ventricular systolic pressure is elevated at 35-40 mmHg    3/2024 Carotid  41-59% stenosis within the left common carotid and right internal carotid arteries.  Antegrade flow demonstrated within the bilateral vertebral arteries       ASSESSMENT / PLAN:  1. Mixed hyperlipidemia  APOLIPOPROTEIN B    LDL, DIRECT    Lipid Profile    Comp Metabolic Panel      2. Primary hypertension        3. Type 2 diabetes mellitus with other circulatory complication, without long-term current use of insulin (HCC)  HEMOGLOBIN A1C    MICROALBUMIN CREAT RATIO URINE      4. Paroxysmal atrial fibrillation (HCC)        5. Secondary hypercoagulable state (HCC)        6. Carotid atherosclerosis, bilateral        7. S/P ablation of atrial fibrillation        8. Atherosclerosis of native coronary artery of native heart without angina pectoris        9. Hx of CABG        10. Statin intolerance        11. Mild concentric left ventricular hypertrophy (LVH)        12. CHOCO treated with BiPAP        13. Long term (current) use of antithrombotics/antiplatelets        14. Metabolic syndrome          Preoperative clearance:   Cleared for R TKA and general/regional anesthesia   No indications of acute cardiopulmonary disease   Revised cardiac risk  index score = 1 points, indicating 6% (low) 30-day postop MACE risk  DASI = 7 METS  (stable functional status)  No additional CV testing needed   Plan:  - copy of encounter to be faxed to Dr. Lindsey office   - recommended for postop VTE prophylaxis with high dose ASA or DOAC, defer decision-making to surgeon along with early mobilization   - ok to hold ASA 5 days preop and restart day after surgery      Blood Pressure Control:  No longer qualifies as resistant HTN   Office BP goal per ACC/AHA <130/80, will consider <140 reasonable   Home BP at goal:  yes  Office BP at goal: yes, known severe WCE noted   24h ABPM (12/2019): Mean daytime: 146/75 consistent with poorly controlled out of office  systolic blood pressure. Nocturnal dip: Appears normal  Device candidate? No due to aget   - PRIOR wider pulse pressure indicative of arteriosclerosis - resolved  Plan:   - continue home BP monitoring, reviewed correct technique:  Yes   - monitor lytes/gfr routinely     Adherence:  Assessment: Complete  Recommendations: Instructed to Continue Taking All Medications As Prescribed    LIFESTYLE INTERVENTIONS  TOBACCO: Stages of Change: Maintenance (sustained change >6mo)    reports that he quit smoking about 38 years ago. His smoking use included cigarettes. He started smoking about 63 years ago. He has a 50 pack-year smoking history. He has never used smokeless tobacco.   - continued complete avoidance of all tobacco products   PHYSICAL ACTIVITY: >150min/week of mod-intensity activity or as much as tolerated  NUTRITION: DASH  and reduce Na to 1500mg/day   ETOH: limit to 1 or less standard drinks/day  WT MGMT: maintain healthy weight      Standard HTN Pharmacotherapy:  - continue trandolapril 4mg qHS for true 24h coverage  - continue amlodipine 5mg daily   Thiazide Type Diuretic: stop due to worsening ED    Additional Agents:  - continue spironolactone 25mg daily, prior on eplerenone 50mg BID   - continue doxazosin 2mg qhs  -  prior stopped diltiazem 180mg ER daily   - failed BB in past due to dizz, allyson     Work up of Secondary Causes of Resistant Hypertension:  present   Renovascular HTN: Excluded  Primary Aldosteronism: Excluded, high renin  Thyroid Function: Excluded     CHOCO on BiPAP  Strongly encouraged CPAP adherence to reduce RV strain, improve overall fatigue symptoms, and improve BP in both the short- and long-term as per HIPARCO (2013) and HIPARCO-2 (2021) studies.   - continue CPAP, defer mgmt to sleep MD    Pheochromocytoma: Not Yet Assessed  Other: none         End Organ Damage:    PRESENT   Left Ventricular Hypertrophy: yes on  Echocardiogram Date: 2022  Albuminuria: Microalbuminuria, level A2 on Date: 2023  Renal Function: Stage 3a - stable     - avoid nephrotoxins  - continue HTN control with RAAS blockade   - monitor lytes/gfr routinely  - eval with nephrology if worsening over time    Established Cardiovascular Disease:    # CAD - s/p CABG, 12/2013, stable  still in high risk stage <10yrs from operation   Plan:  - continue aggressive med mgmt, qualifies for pcsk9i  - ongoing care with cardiology    # AF/AFL, s/p ablation - hx of paroxysmal Afib post-op CABG, recurrent   Appears to be contemporaneous to the vascepa initiation  No current, has been off BB and xarelto in 2013.   Plan:  - continue meds per cardiology  - off OAC, on antiplat only   - Continue monitoring per cardiology    Lipid management:   Secondary prevention per ACC/AHA guidelines   -Myalgias on rosuva and 1/2 dose pravastatin. Has failed simva, atorva, prava.    - despite persistent HTG, apoB is low indicating very low atherogenic potential of TGs and presumed related to dietary CMs   Tx goal: apoB <60,  LDL-C <55   At goal:  yes, 12/2024   Plan:  - reinforced ongoing lifestyle measures to lower TGs   - labs q6mo   Meds:   - no further statin rechallenges due to complete intolerance   - prior stopped zetia as LDL at goal with pcsk9i alone  - continue  praluent 150mg Q2wks, ongoing excellent response since 2020  - stopped vascepa due to intolerance and new onset Afib    FOR PRIOR AUTH:   PSCK9i indicated per 2018 ACC/AHA guidelines in this patient with established ASCVD whose LDL-C remains above threshold of 70 mg/dl despite maximally tolerated statin therapy.    GLYCEMIC MGMT: type 2 with HLD, HTN, ASCVD, high UACR    Goal A1c <7.0%  Lab Results   Component Value Date    HBA1C 7.8 (A) 12/18/2024      Lab Results   Component Value Date/Time    MALBCRT 41 (H) 12/27/2024 07:40 AM    MICROALBUR 74.2 (H) 12/27/2024 07:40 AM   - invokana too expensive   - Does not seem to be greg Metformin more that once daily  Plan:  - continue metformin 500 mg qpm due to SEs unable to start ER form.     - continue acarbose   - holding Invokana 100 mg for now due to expense, consider restart if A1c elevates switch to Jardiance or Farxiga if cost is better  - consider GLP1RA weekly as 3rd agent   - recommmend for routine care with PCP (or endocrine) to include regular A1c monitoring, annual albumin/creatinine ratio (ACR), annual diabetic retinopathy screening, foot exams, annual flu vaccine, and updates to pneumonia vaccines as appropriate     ANTITHROMBOTIC THERAPY: continue ASA 81mg daily , ok to hold preop to TKA   - Completed VKA after ablation - no further Aflut/fib     OTHER:    # hypogonadism, erectile dysfunction, stable and improved off thiazide and spironolactone.  Pt aware of adverse effects of testosterone on lipids  - defer mgmt to PCP and/or urology/endocrine     # vertigo, episodic with hearing loss   - as reviewed with pt this could be multifactorial or contributed to a specific condition vs medication   - current pattern does not necessarily fit a med ADRs nor HTN related that I am able to discern.    - most recent echo normal, Afib and HR in stable range,  BP excursions are limited and ranges would not traditionally be implicated in dizziness causation   - we reviewed  "that the following meds could be problematic:    Causes of stomach uneasiness, nausea - metformin, doxycycline, acarbose   Dizziness - amlodipine, diltiazem, eplerenone  - carotid duplex normal, no steal dynamics   Plan:  - no further w/u at this time  - at future visits could consider \"drug holiday\" for any of these meds in the future where we stop a med for 1-2 weeks but I recommend eval with PCP 1st further further evaluation of his sx      # R TKA - pending (Dr. Lindsey)      STUDIES: none   LABS: as noted above  FOLLOW-UP:  6 months     Javad Childs M.D.   [facility]   [facility]   [phone]   "

## 2025-02-13 ENCOUNTER — TELEPHONE (OUTPATIENT)
Dept: VASCULAR LAB | Facility: MEDICAL CENTER | Age: 80
End: 2025-02-13
Payer: COMMERCIAL

## 2025-02-13 NOTE — TELEPHONE ENCOUNTER
Records sent to [facility] for Dr Atif Lindsey @ [phone].    Wendy SEGAL , Medical Ass't  [facility]   Phone: [phone]   Fax: [phone]

## 2025-02-13 NOTE — TELEPHONE ENCOUNTER
----- Message from Medical Assistant Fracisco Krause Ass't sent at 2/11/2025 10:25 AM PST -----    ----- Message -----  From: Javad Childs M.D.  Sent: 2/11/2025   8:19 AM PST  To: Atif Lindsey M.D.; Vascular [facility] staff - please fax copy of this encounter note to Dr. Atif Lindsey's office (orthopedics)

## 2025-02-21 PROCEDURE — RXMED WILLOW AMBULATORY MEDICATION CHARGE: Performed by: NURSE PRACTITIONER

## 2025-02-24 ENCOUNTER — DOCUMENTATION (OUTPATIENT)
Dept: PHARMACY | Facility: MEDICAL CENTER | Age: 80
End: 2025-02-24
Payer: COMMERCIAL

## 2025-02-24 ENCOUNTER — PHARMACY VISIT (OUTPATIENT)
Dept: PHARMACY | Facility: MEDICAL CENTER | Age: 80
End: 2025-02-24
Payer: COMMERCIAL

## 2025-02-24 NOTE — PROGRESS NOTES
PHARMACIST CLINICAL ONBOARDING - Clinical Onboarding -   Result = Complete, Next card status: Transfer/Restart    Therapeutic Category: Hyperlipidemia  ICD10: E782  Diagnosis Details: Mixed hyperlipidemia [E78.2]  Medication(s) associated with Therapeutic Category: Repatha SureClick Solution Auto-injector 140 MG/ML  Medication(s) prescribed for FDA approved indication?   -Repatha SureClick Solution Auto-injector 140 MG/ML: Yes  Full drug therapy: Repatha 140mg/mL pen injecting 1mL (140mg) under the skin every 14 days   Past treatments: Vascepa, Rosuvastatin, Pravastatin, Simvastatin, Atorvastatin, Ezetimibe, Repatha, Praluent   Goals of therapy: Achieve goal LDL levels (< mg/dL) to reduce risk of cardiovascular events, Implement lifestyle modifications: diet, exercise, weight loss, and/or smoking cessation    Regimen Appropriateness Review: Hyperlipidemia  Any concerning drug and/or non-drug allergies? No  Any concerning drug interactions (drug-drug or dietary)? No  Any concern with prescribed dose (appropriateness, renal, and hepatic adjustments needed)? No  Any comorbidities, past medical history, precautions, or contraindications that pose a medication safety concern? No  Any relevant lab work needed that affects the initial start date? No  Any issues with patient's ability to self-administer medication? No      Patient declined initial clinical pharmacist counseling.

## 2025-02-27 PROBLEM — Z96.651 STATUS POST TOTAL KNEE REPLACEMENT, RIGHT: Status: ACTIVE | Noted: 2025-02-27

## 2025-02-28 PROBLEM — D62 ABLA (ACUTE BLOOD LOSS ANEMIA): Status: ACTIVE | Noted: 2025-02-28

## 2025-03-01 PROBLEM — J96.01 ACUTE HYPOXEMIC RESPIRATORY FAILURE (HCC): Status: ACTIVE | Noted: 2025-03-01

## 2025-03-01 PROBLEM — G93.41 METABOLIC ENCEPHALOPATHY: Status: ACTIVE | Noted: 2025-03-01

## 2025-03-04 PROBLEM — R53.81 PHYSICAL DEBILITY: Status: ACTIVE | Noted: 2025-03-04

## 2025-04-07 DIAGNOSIS — I1A.0 RESISTANT HYPERTENSION: ICD-10-CM

## 2025-04-08 RX ORDER — AMLODIPINE BESYLATE 5 MG/1
TABLET ORAL
Qty: 100 TABLET | Refills: 3 | Status: SHIPPED | OUTPATIENT
Start: 2025-04-08

## 2025-04-08 RX ORDER — SPIRONOLACTONE 25 MG/1
TABLET ORAL
Qty: 100 TABLET | Refills: 3 | Status: SHIPPED | OUTPATIENT
Start: 2025-04-08

## 2025-05-23 PROCEDURE — RXMED WILLOW AMBULATORY MEDICATION CHARGE: Performed by: NURSE PRACTITIONER

## 2025-05-27 ENCOUNTER — PHARMACY VISIT (OUTPATIENT)
Dept: PHARMACY | Facility: MEDICAL CENTER | Age: 80
End: 2025-05-27
Payer: COMMERCIAL

## 2025-08-11 DIAGNOSIS — I10 PRIMARY HYPERTENSION: ICD-10-CM

## 2025-08-11 DIAGNOSIS — I1A.0 RESISTANT HYPERTENSION: Primary | ICD-10-CM

## 2025-08-18 ENCOUNTER — OFFICE VISIT (OUTPATIENT)
Dept: VASCULAR LAB | Facility: MEDICAL CENTER | Age: 80
End: 2025-08-18
Attending: FAMILY MEDICINE
Payer: COMMERCIAL

## 2025-08-18 VITALS
DIASTOLIC BLOOD PRESSURE: 65 MMHG | HEART RATE: 66 BPM | WEIGHT: 206 LBS | SYSTOLIC BLOOD PRESSURE: 131 MMHG | BODY MASS INDEX: 33.11 KG/M2 | HEIGHT: 66 IN

## 2025-08-18 DIAGNOSIS — I25.10 ATHEROSCLEROSIS OF NATIVE CORONARY ARTERY OF NATIVE HEART WITHOUT ANGINA PECTORIS: ICD-10-CM

## 2025-08-18 DIAGNOSIS — E66.811 CLASS 1 OBESITY WITH SERIOUS COMORBIDITY AND BODY MASS INDEX (BMI) OF 33.0 TO 33.9 IN ADULT, UNSPECIFIED OBESITY TYPE: ICD-10-CM

## 2025-08-18 DIAGNOSIS — D68.69 SECONDARY HYPERCOAGULABLE STATE (HCC): ICD-10-CM

## 2025-08-18 DIAGNOSIS — G47.33 OSA TREATED WITH BIPAP: ICD-10-CM

## 2025-08-18 DIAGNOSIS — Z86.79 S/P ABLATION OF ATRIAL FIBRILLATION: ICD-10-CM

## 2025-08-18 DIAGNOSIS — E78.2 MIXED HYPERLIPIDEMIA: ICD-10-CM

## 2025-08-18 DIAGNOSIS — E11.59 TYPE 2 DIABETES MELLITUS WITH OTHER CIRCULATORY COMPLICATION, WITHOUT LONG-TERM CURRENT USE OF INSULIN (HCC): Primary | ICD-10-CM

## 2025-08-18 DIAGNOSIS — I1A.0 RESISTANT HYPERTENSION: ICD-10-CM

## 2025-08-18 DIAGNOSIS — Z98.890 S/P ABLATION OF ATRIAL FIBRILLATION: ICD-10-CM

## 2025-08-18 DIAGNOSIS — Z95.1 HX OF CABG: ICD-10-CM

## 2025-08-18 DIAGNOSIS — I51.7 MILD CONCENTRIC LEFT VENTRICULAR HYPERTROPHY (LVH): ICD-10-CM

## 2025-08-18 DIAGNOSIS — Z79.02 LONG TERM (CURRENT) USE OF ANTITHROMBOTICS/ANTIPLATELETS: ICD-10-CM

## 2025-08-18 DIAGNOSIS — I48.0 PAROXYSMAL ATRIAL FIBRILLATION (HCC): ICD-10-CM

## 2025-08-18 DIAGNOSIS — Z78.9 STATIN INTOLERANCE: ICD-10-CM

## 2025-08-18 PROCEDURE — 99214 OFFICE O/P EST MOD 30 MIN: CPT | Performed by: FAMILY MEDICINE

## 2025-08-18 PROCEDURE — 99214 OFFICE O/P EST MOD 30 MIN: CPT

## 2025-08-18 PROCEDURE — 3078F DIAST BP <80 MM HG: CPT | Performed by: FAMILY MEDICINE

## 2025-08-18 PROCEDURE — G2211 COMPLEX E/M VISIT ADD ON: HCPCS | Performed by: FAMILY MEDICINE

## 2025-08-18 PROCEDURE — 3075F SYST BP GE 130 - 139MM HG: CPT | Performed by: FAMILY MEDICINE

## 2025-08-18 RX ORDER — SPIRONOLACTONE 25 MG/1
12.5 TABLET ORAL EVERY MORNING
Qty: 50 TABLET | Refills: 3 | Status: SHIPPED | OUTPATIENT
Start: 2025-08-18

## 2025-08-18 ASSESSMENT — FIBROSIS 4 INDEX: FIB4 SCORE: 2.45

## 2025-08-18 ASSESSMENT — ENCOUNTER SYMPTOMS
WEAKNESS: 0
FEVER: 0
CHILLS: 0
PALPITATIONS: 0
NAUSEA: 0
COUGH: 0
BRUISES/BLEEDS EASILY: 0
MYALGIAS: 0
CLAUDICATION: 0
ORTHOPNEA: 0

## 2025-08-20 ENCOUNTER — SPECIALTY PHARMACY (OUTPATIENT)
Dept: VASCULAR LAB | Facility: MEDICAL CENTER | Age: 80
End: 2025-08-20
Payer: COMMERCIAL

## 2025-08-20 ENCOUNTER — RESULTS FOLLOW-UP (OUTPATIENT)
Dept: VASCULAR LAB | Facility: MEDICAL CENTER | Age: 80
End: 2025-08-20
Payer: COMMERCIAL

## 2025-08-22 ENCOUNTER — SPECIALTY PHARMACY (OUTPATIENT)
Dept: VASCULAR LAB | Facility: MEDICAL CENTER | Age: 80
End: 2025-08-22
Payer: COMMERCIAL

## 2025-08-22 PROCEDURE — RXMED WILLOW AMBULATORY MEDICATION CHARGE: Performed by: NURSE PRACTITIONER

## 2025-08-25 ENCOUNTER — PHARMACY VISIT (OUTPATIENT)
Dept: PHARMACY | Facility: MEDICAL CENTER | Age: 80
End: 2025-08-25
Payer: COMMERCIAL

## (undated) DEVICE — GOWN WARMING STANDARD FLEX - (30/CA)

## (undated) DEVICE — SUTURE 2-0 VICRYL PLUS CT-1 - 8 X 18 INCH(12/BX)

## (undated) DEVICE — ELECTRODE DUAL RETURN W/ CORD - (50/PK)

## (undated) DEVICE — PACK JACKSON TABLE KIT W/OUT - HR (6EA/CA)

## (undated) DEVICE — SET EXTENSION WITH 2 PORTS (48EA/CA) ***PART #2C8610 IS A SUBSTITUTE*****

## (undated) DEVICE — KIT ROOM DECONTAMINATION

## (undated) DEVICE — TOOL DISSECT MATCH HEAD

## (undated) DEVICE — NEEDLE NON-SAFETY HYPO 21 GA X 1 1/2 IN HYPO (100/BX)

## (undated) DEVICE — FIBRILLAR SURGICEL 4X4 - 10/CA

## (undated) DEVICE — DRAPE LAPAROTOMY T SHEET - (12EA/CA)

## (undated) DEVICE — CANISTER SUCTION 3000ML MECHANICAL FILTER AUTO SHUTOFF MEDI-VAC NONSTERILE LF DISP  (40EA/CA)

## (undated) DEVICE — LACTATED RINGERS INJ 1000 ML - (14EA/CA 60CA/PF)

## (undated) DEVICE — SUTURE 1 VICRYL PLUS CT-1 - 18 INCH (12/BX)

## (undated) DEVICE — KIT ANESTHESIA W/CIRCUIT & 3/LT BAG W/FILTER (20EA/CA)

## (undated) DEVICE — STERI STRIP COMPOUND BENZOIN - TINCTURE 0.6ML WITH APPLICATOR (40EA/BX)

## (undated) DEVICE — TUBING C&T SET FLYING LEADS DRAIN TUBING (10EA/BX)

## (undated) DEVICE — LACTATED RINGERS INJ. 500 ML - (24EA/CA)

## (undated) DEVICE — MASK ANESTHESIA ADULT  - (100/CA)

## (undated) DEVICE — MIDAS LUBRICATOR DIFFUSER PACK (4EA/CA)

## (undated) DEVICE — NEPTUNE 4 PORT MANIFOLD - (20/PK)

## (undated) DEVICE — GLOVE PROTEXIS W/NEU-THERA SZ 8.5 (50PR/BX)

## (undated) DEVICE — HEADREST PRONEVIEW LARGE - (10/CA)

## (undated) DEVICE — LIGHT SOURCE MIS 12FT

## (undated) DEVICE — SYRINGE 30 ML LL (56/BX)

## (undated) DEVICE — GLOVE BIOGEL SZ 6.5 SURGICAL PF LTX (50PR/BX 4BX/CA)

## (undated) DEVICE — PACK NEURO - (2EA/CA)

## (undated) DEVICE — GVL 4 STAT DISPOSABLE - (10/BX)

## (undated) DEVICE — SUTURE GENERAL

## (undated) DEVICE — CATHETER EPIDURAL PORTEX (10/BX) ***DISCONTINUED LOOKING INTO SUB***

## (undated) DEVICE — PROTECTOR ULNA NERVE - (36PR/CA)

## (undated) DEVICE — KIT SURGIFLO W/OUT THROMBIN - (6EA/CA)

## (undated) DEVICE — CLOSURE SKIN STRIP 1/2 X 4 IN - (STERI STRIP) (50/BX 4BX/CA)

## (undated) DEVICE — DRAPE MICROSCOPE X-LONG (10EA/CA)

## (undated) DEVICE — ELECTRODE 850 FOAM ADHESIVE - HYDROGEL RADIOTRNSPRNT (50/PK)

## (undated) DEVICE — SLEEVE, VASO, THIGH, MED

## (undated) DEVICE — SYRINGE SAFETY 10 ML 18 GA X 1 1/2 BLUNT LL (100/BX 4BX/CA)

## (undated) DEVICE — KIT EVACUATER 3 SPRING PVC LF 1/8 DRAIN SIZE (10EA/CA)"

## (undated) DEVICE — TUBE E-T HI-LO CUFF 8.0MM (10EA/PK)

## (undated) DEVICE — GLOVE BIOGEL ECLIPSE PF LATEX SIZE 8.0  (50PR/BX)

## (undated) DEVICE — CHLORAPREP 26 ML APPLICATOR - ORANGE TINT(25/CA)

## (undated) DEVICE — ARMREST CRADLE FOAM - (2PR/PK 12PR/CA)

## (undated) DEVICE — SET LEADWIRE 5 LEAD BEDSIDE DISPOSABLE ECG (1SET OF 5/EA)

## (undated) DEVICE — SENSOR SPO2 NEO LNCS ADHESIVE (20/BX) SEE USER NOTES

## (undated) DEVICE — GLOVE BIOGEL PI INDICATOR SZ 7.0 SURGICAL PF LF - (50/BX 4BX/CA)

## (undated) DEVICE — TUBE E-T HI-LO CUFF 7.5MM (10EA/PK)

## (undated) DEVICE — SUCTION INSTRUMENT YANKAUER BULBOUS TIP W/O VENT (50EA/CA)

## (undated) DEVICE — SPONGE GAUZESTER 4 X 4 4PLY - (128PK/CA)

## (undated) DEVICE — SYRINGE DISP. 12 CC LL - (100/BX)

## (undated) DEVICE — GLOVE BIOGEL PI INDICATOR SZ 8.0 SURGICAL PF LF -(50/BX 4BX/CA)

## (undated) DEVICE — BLADE SURGICAL CLIPPER - (50EA/CA)

## (undated) DEVICE — GLOVE BIOGEL ECLIPSE PF LATEX SIZE 8.5 (50PR/BX)

## (undated) DEVICE — GLOVE BIOGEL PI ORTHO SZ 7.5 PF LF (40PR/BX)

## (undated) DEVICE — TUBING CLEARLINK DUO-VENT - C-FLO (48EA/CA)

## (undated) DEVICE — SODIUM CHL IRRIGATION 0.9% 1000ML (12EA/CA)

## (undated) DEVICE — INTRAOP NEURO IN OR 1:1 PER 15 MIN

## (undated) DEVICE — GLOVE BIOGEL INDICATOR SZ 7SURGICAL PF LTX - (50/BX 4BX/CA)

## (undated) DEVICE — GLOVE BIOGEL SZ 7 SURGICAL PF LTX - (50PR/BX 4BX/CA)